# Patient Record
Sex: MALE | Race: WHITE | NOT HISPANIC OR LATINO | Employment: OTHER | URBAN - NONMETROPOLITAN AREA
[De-identification: names, ages, dates, MRNs, and addresses within clinical notes are randomized per-mention and may not be internally consistent; named-entity substitution may affect disease eponyms.]

---

## 2017-08-28 ENCOUNTER — OFFICE VISIT (OUTPATIENT)
Dept: UROLOGY | Facility: CLINIC | Age: 82
End: 2017-08-28

## 2017-08-28 VITALS
BODY MASS INDEX: 25.06 KG/M2 | OXYGEN SATURATION: 96 % | TEMPERATURE: 97.7 F | WEIGHT: 179 LBS | SYSTOLIC BLOOD PRESSURE: 102 MMHG | HEART RATE: 61 BPM | DIASTOLIC BLOOD PRESSURE: 70 MMHG | HEIGHT: 71 IN

## 2017-08-28 DIAGNOSIS — N40.0 BENIGN NON-NODULAR PROSTATIC HYPERPLASIA WITHOUT LOWER URINARY TRACT SYMPTOMS: Primary | ICD-10-CM

## 2017-08-28 DIAGNOSIS — G89.29 CHRONIC MIDLINE LOW BACK PAIN WITHOUT SCIATICA: ICD-10-CM

## 2017-08-28 DIAGNOSIS — M54.50 CHRONIC MIDLINE LOW BACK PAIN WITHOUT SCIATICA: ICD-10-CM

## 2017-08-28 LAB
BILIRUB BLD-MCNC: NEGATIVE MG/DL
CLARITY, POC: CLEAR
COLOR UR: YELLOW
GLUCOSE UR STRIP-MCNC: NEGATIVE MG/DL
KETONES UR QL: NEGATIVE
LEUKOCYTE EST, POC: NEGATIVE
NITRITE UR-MCNC: NEGATIVE MG/ML
PH UR: 6 [PH] (ref 5–8)
PROT UR STRIP-MCNC: NEGATIVE MG/DL
PSA SERPL-MCNC: 0.57 NG/ML (ref 0.06–4)
RBC # UR STRIP: NEGATIVE /UL
SP GR UR: 1.02 (ref 1–1.03)
UROBILINOGEN UR QL: NORMAL

## 2017-08-28 PROCEDURE — 99213 OFFICE O/P EST LOW 20 MIN: CPT | Performed by: UROLOGY

## 2017-08-28 PROCEDURE — 81003 URINALYSIS AUTO W/O SCOPE: CPT | Performed by: UROLOGY

## 2017-08-28 NOTE — PROGRESS NOTES
Chief Complaint  Benign Prostatic Hypertrophy (Yearly exam)        FRIDA Brown is a 84 y.o. male who returns today for annual checkup with a history of an enlarged prostate and symptoms of BPH but voiding pretty well on the current regimen of Flomax and Proscar and Cardura.  New complaint today is back pain that he states is been present intermittently for years since he had an injury to his back Working [employer], Department.    Vitals:    08/28/17 0922   BP: 102/70   Pulse: 61   Temp: 97.7 °F (36.5 °C)   SpO2: 96%       Past Medical History  Past Medical History:   Diagnosis Date   • Arthritis    • Asthma    • COPD (chronic obstructive pulmonary disease)        Past Surgical History  No past surgical history on file.    Medications  has a current medication list which includes the following prescription(s): aspirin, atorvastatin, ciprofloxacin, doxazosin, finasteride, ibuprofen, levothyroxine, levothyroxine sodium, montelukast, simvastatin, tamsulosin, tiotropium, valsartan, and valsartan-hydrochlorothiazide.      Allergies  No Known Allergies    Social History  Social History     Social History Narrative       Family History  He has no family history of bladder or kidney cancer  He has no family history of kidney stones      AUA Symptom Score:      Review of Systems  Review of Systems    Physical Exam  Physical Exam   Constitutional: He is oriented to person, place, and time. He appears well-developed and well-nourished.   HENT:   Head: Normocephalic and atraumatic.   Neck: Normal range of motion.   Pulmonary/Chest: Effort normal. No respiratory distress.   Abdominal: Soft. He exhibits no distension and no mass. There is no tenderness. No hernia.   Genitourinary: Rectum normal. Prostate is enlarged.   Musculoskeletal: Normal range of motion.   Lymphadenopathy:     He has no cervical adenopathy.   Neurological: He is alert and oriented to person, place, and time.   Skin: Skin is warm and dry.   Psychiatric: He  has a normal mood and affect. His behavior is normal.   Vitals reviewed.      Labs Recent and today in the office:  Results for orders placed or performed in visit on 08/28/17   POC Urinalysis Dipstick, Automated   Result Value Ref Range    Color Yellow Yellow, Straw, Dark Yellow, Maday    Clarity, UA Clear Clear    Glucose, UA Negative Negative, 1000 mg/dL (3+) mg/dL    Bilirubin Negative Negative    Ketones, UA Negative Negative    Specific Gravity  1.025 1.005 - 1.030    Blood, UA Negative Negative    pH, Urine 6.0 5.0 - 8.0    Protein, POC Negative Negative mg/dL    Urobilinogen, UA Normal Normal    Leukocytes Negative Negative    Nitrite, UA Negative Negative         Assessment & Plan  The prostate is only mildly enlarged with induration but no nodularity.  He consents to a PSA today and if significantly elevated may need workup for prostate cancer.  Otherwise I think his back pain is probably musculoskeletal in etiology and I recommend physical therapy.  He'll check back with his family doctor for referral.  He is also having some symptoms with his bowels that may need evaluating.

## 2017-09-23 DIAGNOSIS — N40.0 BENIGN NON-NODULAR PROSTATIC HYPERPLASIA WITHOUT LOWER URINARY TRACT SYMPTOMS: ICD-10-CM

## 2017-09-23 RX ORDER — FINASTERIDE 5 MG/1
5 TABLET, FILM COATED ORAL DAILY
Qty: 90 TABLET | Refills: 3 | Status: SHIPPED | OUTPATIENT
Start: 2017-09-23 | End: 2018-09-27 | Stop reason: SDUPTHER

## 2017-09-23 RX ORDER — TAMSULOSIN HYDROCHLORIDE 0.4 MG/1
1 CAPSULE ORAL NIGHTLY
Qty: 90 CAPSULE | Refills: 3 | Status: SHIPPED | OUTPATIENT
Start: 2017-09-23 | End: 2018-09-27 | Stop reason: SDUPTHER

## 2017-09-23 RX ORDER — DOXAZOSIN MESYLATE 4 MG/1
4 TABLET ORAL NIGHTLY
Qty: 90 TABLET | Refills: 3 | Status: SHIPPED | OUTPATIENT
Start: 2017-09-23

## 2018-01-26 ENCOUNTER — TRANSCRIBE ORDERS (OUTPATIENT)
Dept: ADMINISTRATIVE | Facility: HOSPITAL | Age: 83
End: 2018-01-26

## 2018-01-26 ENCOUNTER — HOSPITAL ENCOUNTER (OUTPATIENT)
Dept: GENERAL RADIOLOGY | Facility: HOSPITAL | Age: 83
Discharge: HOME | End: 2018-01-26
Admitting: FAMILY MEDICINE

## 2018-01-26 DIAGNOSIS — M79.652 PAIN IN BOTH THIGHS: Primary | ICD-10-CM

## 2018-01-26 DIAGNOSIS — M79.651 PAIN IN BOTH THIGHS: Primary | ICD-10-CM

## 2018-01-26 PROCEDURE — 72110 X-RAY EXAM L-2 SPINE 4/>VWS: CPT

## 2018-03-06 ENCOUNTER — OFFICE VISIT (OUTPATIENT)
Dept: CARDIAC SURGERY | Facility: CLINIC | Age: 83
End: 2018-03-06

## 2018-03-06 VITALS
WEIGHT: 185 LBS | BODY MASS INDEX: 25.9 KG/M2 | HEIGHT: 71 IN | TEMPERATURE: 98.9 F | DIASTOLIC BLOOD PRESSURE: 82 MMHG | SYSTOLIC BLOOD PRESSURE: 179 MMHG | OXYGEN SATURATION: 98 % | HEART RATE: 69 BPM

## 2018-03-06 DIAGNOSIS — I71.40 ABDOMINAL AORTIC ANEURYSM (AAA) WITHOUT RUPTURE (HCC): Primary | ICD-10-CM

## 2018-03-06 PROCEDURE — 99203 OFFICE O/P NEW LOW 30 MIN: CPT | Performed by: THORACIC SURGERY (CARDIOTHORACIC VASCULAR SURGERY)

## 2018-03-06 RX ORDER — MELATONIN
1000 DAILY
Status: ON HOLD | COMMUNITY
End: 2019-04-08

## 2018-03-06 RX ORDER — VITAMIN E 268 MG
400 CAPSULE ORAL DAILY
COMMUNITY
End: 2022-10-18

## 2018-09-27 ENCOUNTER — OFFICE VISIT (OUTPATIENT)
Dept: UROLOGY | Facility: CLINIC | Age: 83
End: 2018-09-27

## 2018-09-27 VITALS
TEMPERATURE: 98.5 F | SYSTOLIC BLOOD PRESSURE: 136 MMHG | WEIGHT: 185 LBS | HEIGHT: 71 IN | BODY MASS INDEX: 25.9 KG/M2 | OXYGEN SATURATION: 94 % | HEART RATE: 71 BPM | DIASTOLIC BLOOD PRESSURE: 79 MMHG

## 2018-09-27 DIAGNOSIS — N40.0 BENIGN NON-NODULAR PROSTATIC HYPERPLASIA WITHOUT LOWER URINARY TRACT SYMPTOMS: Primary | ICD-10-CM

## 2018-09-27 LAB
BILIRUB BLD-MCNC: NEGATIVE MG/DL
CLARITY, POC: CLEAR
COLOR UR: YELLOW
GLUCOSE UR STRIP-MCNC: NEGATIVE MG/DL
KETONES UR QL: NEGATIVE
LEUKOCYTE EST, POC: NEGATIVE
NITRITE UR-MCNC: NEGATIVE MG/ML
PH UR: 6 [PH] (ref 5–8)
PROT UR STRIP-MCNC: NEGATIVE MG/DL
RBC # UR STRIP: NEGATIVE /UL
SP GR UR: 1.01 (ref 1–1.03)
UROBILINOGEN UR QL: NORMAL

## 2018-09-27 PROCEDURE — 99213 OFFICE O/P EST LOW 20 MIN: CPT | Performed by: UROLOGY

## 2018-09-27 PROCEDURE — 81003 URINALYSIS AUTO W/O SCOPE: CPT | Performed by: UROLOGY

## 2018-09-27 RX ORDER — TAMSULOSIN HYDROCHLORIDE 0.4 MG/1
1 CAPSULE ORAL NIGHTLY
Qty: 90 CAPSULE | Refills: 3 | Status: SHIPPED | OUTPATIENT
Start: 2018-09-27 | End: 2019-05-20 | Stop reason: SDUPTHER

## 2018-09-27 RX ORDER — FINASTERIDE 5 MG/1
5 TABLET, FILM COATED ORAL DAILY
Qty: 90 TABLET | Refills: 3 | Status: SHIPPED | OUTPATIENT
Start: 2018-09-27

## 2018-09-27 NOTE — PROGRESS NOTES
Chief Complaint  Benign Prostatic Hypertrophy (yearly fup.)        FRIDA Brown is a 85 y.o. male who returns today for annual checkup with a long history of an enlarged prostate and symptoms of bladder outlet obstruction but voiding pretty well on Flomax and Proscar.  He describes an AUA index today of 1.  He has clear urine is negative for blood and infection.  He had a PSA and digital rectal exam that was benign last year and declines repeat today.  Biggest complaint today his macular degeneration and loss of eyesight    Vitals:    09/27/18 1319   BP: 136/79   Pulse: 71   Temp: 98.5 °F (36.9 °C)   SpO2: 94%       Past Medical History  Past Medical History:   Diagnosis Date   • Arthritis    • Asthma    • COPD (chronic obstructive pulmonary disease) (CMS/[facility])    • H/O emphysema    • Hiatal hernia    • Hyperlipidemia    • Hypertension    • Thyroid disease        Past Surgical History  Past Surgical History:   Procedure Laterality Date   • ABDOMINAL AORTIC ANEURYSM REPAIR     • HERNIA REPAIR     • KNEE SURGERY         Medications  has a current medication list which includes the following prescription(s): aspirin, atorvastatin, cholecalciferol, ciprofloxacin, doxazosin, finasteride, ibuprofen, levothyroxine, levothyroxine sodium, montelukast, simvastatin, tamsulosin, tiotropium, valsartan, valsartan-hydrochlorothiazide, and vitamin e.      Allergies  Allergies   Allergen Reactions   • Atorvastatin Unknown (See Comments)       Social History  Social History     Social History Narrative   • No narrative on file       Family History  He has no family history of bladder or kidney cancer  He has no family history of kidney stones      AUA Symptom Score:      Review of Systems  Review of Systems   Constitutional: Negative.    Genitourinary: Negative.    All other systems reviewed and are negative.      Physical Exam  Physical Exam    Labs Recent and today in the office:  Results for orders placed or performed in visit on  09/27/18   POC Urinalysis Dipstick, Automated   Result Value Ref Range    Color Yellow Yellow, Straw, Dark Yellow, Maday    Clarity, UA Clear Clear    Specific Gravity  1.015 1.005 - 1.030    pH, Urine 6.0 5.0 - 8.0    Leukocytes Negative Negative    Nitrite, UA Negative Negative    Protein, POC Negative Negative mg/dL    Glucose, UA Negative Negative, 1000 mg/dL (3+) mg/dL    Ketones, UA Negative Negative    Urobilinogen, UA Normal Normal    Bilirubin Negative Negative    Blood, UA Negative Negative         Assessment & Plan  #1 BPH with bladder outlet obstruction: Currently voiding fine on Flomax and Proscar.  These are renewed and he can return on an annual basis

## 2019-02-05 ENCOUNTER — HOSPITAL ENCOUNTER (EMERGENCY)
Facility: HOSPITAL | Age: 84
Discharge: HOME | End: 2019-02-05
Attending: EMERGENCY MEDICINE | Admitting: EMERGENCY MEDICINE

## 2019-02-05 ENCOUNTER — APPOINTMENT (OUTPATIENT)
Dept: GENERAL RADIOLOGY | Facility: HOSPITAL | Age: 84
End: 2019-02-05

## 2019-02-05 VITALS
WEIGHT: 180 LBS | HEIGHT: 71 IN | DIASTOLIC BLOOD PRESSURE: 68 MMHG | RESPIRATION RATE: 18 BRPM | TEMPERATURE: 98 F | OXYGEN SATURATION: 94 % | HEART RATE: 44 BPM | SYSTOLIC BLOOD PRESSURE: 153 MMHG | BODY MASS INDEX: 25.2 KG/M2

## 2019-02-05 DIAGNOSIS — R00.1 BRADYCARDIA, SINUS: Primary | ICD-10-CM

## 2019-02-05 LAB
ALBUMIN SERPL-MCNC: 4.1 G/DL (ref 3.5–5)
ALBUMIN/GLOB SERPL: 1.3 G/DL (ref 1–2)
ALP SERPL-CCNC: 61 U/L (ref 38–126)
ALT SERPL W P-5'-P-CCNC: 22 U/L (ref 13–69)
ANION GAP SERPL CALCULATED.3IONS-SCNC: 11.9 MMOL/L (ref 10–20)
AST SERPL-CCNC: 27 U/L (ref 15–46)
BILIRUB SERPL-MCNC: 0.7 MG/DL (ref 0.2–1.3)
BUN BLD-MCNC: 39 MG/DL (ref 7–20)
BUN/CREAT SERPL: 21.7 (ref 6.3–21.9)
CALCIUM SPEC-SCNC: 8.9 MG/DL (ref 8.4–10.2)
CHLORIDE SERPL-SCNC: 108 MMOL/L (ref 98–107)
CHOLEST SERPL-MCNC: 110 MG/DL (ref 0–199)
CO2 SERPL-SCNC: 23 MMOL/L (ref 26–30)
CREAT BLD-MCNC: 1.8 MG/DL (ref 0.6–1.3)
DEPRECATED RDW RBC AUTO: 43.8 FL (ref 37–54)
ERYTHROCYTE [DISTWIDTH] IN BLOOD BY AUTOMATED COUNT: 13.1 % (ref 11.5–14.5)
GFR SERPL CREATININE-BSD FRML MDRD: 36 ML/MIN/1.73
GLOBULIN UR ELPH-MCNC: 3.2 GM/DL
GLUCOSE BLD-MCNC: 109 MG/DL (ref 74–98)
HBA1C MFR BLD: 5.8 % (ref 3–6)
HCT VFR BLD AUTO: 32.3 % (ref 42–52)
HDLC SERPL-MCNC: 36 MG/DL (ref 40–60)
HGB BLD-MCNC: 10.7 G/DL (ref 14–18)
LDLC SERPL CALC-MCNC: 55 MG/DL (ref 0–99)
LDLC/HDLC SERPL: 1.53 {RATIO}
MCH RBC QN AUTO: 30.7 PG (ref 27–31)
MCHC RBC AUTO-ENTMCNC: 33.1 G/DL (ref 30–37)
MCV RBC AUTO: 92.8 FL (ref 80–94)
PLATELET # BLD AUTO: 108 10*3/MM3 (ref 130–400)
PMV BLD AUTO: 10.9 FL (ref 6–12)
POTASSIUM BLD-SCNC: 4.9 MMOL/L (ref 3.5–5.1)
PROT SERPL-MCNC: 7.3 G/DL (ref 6.3–8.2)
RBC # BLD AUTO: 3.48 10*6/MM3 (ref 4.7–6.1)
SCAN SLIDE: NORMAL
SODIUM BLD-SCNC: 138 MMOL/L (ref 137–145)
TRIGL SERPL-MCNC: 94 MG/DL
TROPONIN I SERPL-MCNC: 0.01 NG/ML (ref 0–0.03)
TSH SERPL DL<=0.05 MIU/L-ACNC: 3 MIU/ML (ref 0.47–4.68)
VLDLC SERPL-MCNC: 18.8 MG/DL
WBC NRBC COR # BLD: 6.19 10*3/MM3 (ref 4.8–10.8)

## 2019-02-05 PROCEDURE — 71101 X-RAY EXAM UNILAT RIBS/CHEST: CPT

## 2019-02-05 PROCEDURE — 83036 HEMOGLOBIN GLYCOSYLATED A1C: CPT | Performed by: PHYSICIAN ASSISTANT

## 2019-02-05 PROCEDURE — 80061 LIPID PANEL: CPT | Performed by: PHYSICIAN ASSISTANT

## 2019-02-05 PROCEDURE — 85025 COMPLETE CBC W/AUTO DIFF WBC: CPT | Performed by: PHYSICIAN ASSISTANT

## 2019-02-05 PROCEDURE — 84484 ASSAY OF TROPONIN QUANT: CPT | Performed by: PHYSICIAN ASSISTANT

## 2019-02-05 PROCEDURE — 85060 BLOOD SMEAR INTERPRETATION: CPT | Performed by: PHYSICIAN ASSISTANT

## 2019-02-05 PROCEDURE — 80053 COMPREHEN METABOLIC PANEL: CPT | Performed by: PHYSICIAN ASSISTANT

## 2019-02-05 PROCEDURE — 99284 EMERGENCY DEPT VISIT MOD MDM: CPT

## 2019-02-05 PROCEDURE — 84443 ASSAY THYROID STIM HORMONE: CPT | Performed by: PHYSICIAN ASSISTANT

## 2019-02-05 PROCEDURE — 85007 BL SMEAR W/DIFF WBC COUNT: CPT | Performed by: PHYSICIAN ASSISTANT

## 2019-02-05 PROCEDURE — 93005 ELECTROCARDIOGRAM TRACING: CPT | Performed by: PHYSICIAN ASSISTANT

## 2019-02-05 NOTE — DISCHARGE INSTRUCTIONS
Upon discharge from the ER go immediately to cardiologist Dr. Gallardo's office - you will be placed on a heart monitor and then tomorrow Dr. Gallardo is going to have an office visit with you    Return to ER if worse or new symptoms

## 2019-02-05 NOTE — ED PROVIDER NOTES
Subjective   84 y/o male presents with chest pain.  Patient states about 2-3 weeks he thinks he had a syncopal episode - patient states he was standing in his home looking out a window and the next thing he knows he finds himself on the floor.  Patient states for the past 2-3 weeks he has also been having intermittent chest pain.  Patient is not sure if the chest pain started before after the probable syncopal episode.  Patient states he has intermittent chest pain on the left side of his chest that radiates into his LUE.  Patient states it feels like his heart is skipping beats or going to stop.  Denies diaphoresis, SOA, nausea, vomiting.  Patient states he usually notices this pain when he is going up and down stairs at his home and then is relieved with rest.  This will last for seconds to minutes.  Patient is not a cigarette smoker.  History of HTN and hyperlipidemia.  Denies diabetes.  Patient states he has had AAA repair.  Denies MI, stents, arrhythmia.  Patient states he is not seen by a cardiologist.             Review of Systems   Cardiovascular: Positive for chest pain.   Neurological: Positive for syncope.   All other systems reviewed and are negative.      Past Medical History:   Diagnosis Date   • Arthritis    • Asthma    • COPD (chronic obstructive pulmonary disease) (CMS/[facility])    • H/O emphysema    • Hiatal hernia    • Hyperlipidemia    • Hypertension    • Thyroid disease        Allergies   Allergen Reactions   • Atorvastatin Unknown (See Comments)       Past Surgical History:   Procedure Laterality Date   • ABDOMINAL AORTIC ANEURYSM REPAIR     • HERNIA REPAIR     • KNEE SURGERY         Family History   Problem Relation Age of Onset   • No Known Problems Mother    • No Known Problems Father    • Bone cancer Sister    • Lung cancer Brother        Social History     Socioeconomic History   • Marital status:      Spouse name: Not on file   • Number of children: 2   • Years of education: Not on file    • Highest education level: Not on file   Occupational History   • Occupation:      Employer: RETIRED   Tobacco Use   • Smoking status: Former Smoker     Packs/day: 1.50     Years: 30.00     Pack years: 45.00     Types: Cigarettes     Last attempt to quit: 3/5/1998     Years since quittin.9   • Smokeless tobacco: Never Used   Substance and Sexual Activity   • Alcohol use: No   • Drug use: No   • Sexual activity: Defer           Objective   Physical Exam   Constitutional: He is oriented to person, place, and time. He appears well-developed and well-nourished. No distress.   HENT:   Head: Normocephalic and atraumatic.   Nose: Nose normal.   Mouth/Throat: Oropharynx is clear and moist.   Eyes: Conjunctivae and EOM are normal.   Neck: Normal range of motion. Neck supple. No JVD present. No tracheal deviation present.   Cardiovascular: Regular rhythm and normal heart sounds.   bradycardia   Pulmonary/Chest: Effort normal and breath sounds normal. No stridor. No respiratory distress.   Musculoskeletal: He exhibits no tenderness or deformity.   Trace bilateral pitting edema to lower extremities   Lymphadenopathy:     He has no cervical adenopathy.   Neurological: He is alert and oriented to person, place, and time.   No slurred speech, no facial droop, equal  in upper extremities, equal strength in lower extremities   Skin: Skin is warm and dry. No rash noted. He is not diaphoretic.   Psychiatric: He has a normal mood and affect. His behavior is normal.   Nursing note and vitals reviewed.      Procedures           ED Course  ED Course as of  153   Tue 2019   1356 EKG: Interpreted by me. SB w/ HR 52, nl intervals, no baldomero/std. TWI in lead aVL. Abnormal EKG  [AI]      ED Course User Index  [AI] Vikram Ellis MD                  MDM  Number of Diagnoses or Management Options  Bradycardia, sinus:      Amount and/or Complexity of Data Reviewed  Clinical lab tests: reviewed and  ordered  Tests in the radiology section of CPT®: reviewed and ordered  Discuss the patient with other providers: yes  Independent visualization of images, tracings, or specimens: yes    Risk of Complications, Morbidity, and/or Mortality  Presenting problems: moderate  Diagnostic procedures: moderate  Management options: moderate  General comments: While in the ER patient's heart rate ranged from 42 to 61.  Patient was asymptomatic while in the ER.  Patient is the sole caregiver of his wife at their home and refuses to be admitted.  Discussed with patient that is heart could keep decreased causing him to feel SOA, CP, weak, dizzy, and have a syncopal episode again.  Went on to discuss with patient that he could be admitted and seen by cardiology.  Patient again refuses, but was agreeable with me to consult cardiology for outpatient purposes.  On-call cardiologist Dr. Gallardo was consulted who would like a TSH, lipid profile, and a1c to be added to current blood work, patient to come to his office now to place on a heart monitor, and then will see patient tomorrow in an office visit.  This was discussed with patient who verbalizes understanding and is agreeable with this plan.  Patient understands if he has new symptoms or become worse then immediately call 911 or come to nearest ER.    Patient Progress  Patient progress: stable        Final diagnoses:   Bradycardia, sinus            Sera Jimenez PA-C  02/05/19 2295

## 2019-02-05 NOTE — ED NOTES
Pt hr dropping down to 41 bpm, Davdi Jimenez notified. No orders received at this time.      Karyn Castillo, STAN  02/05/19 2715

## 2019-02-08 LAB
CYTOLOGIST CVX/VAG CYTO: NORMAL
EOSINOPHIL # BLD MANUAL: 1.67 10*3/MM3 (ref 0–0.7)
EOSINOPHIL NFR BLD MANUAL: 27 % (ref 0–7)
LARGE PLATELETS: ABNORMAL
LYMPHOCYTES # BLD MANUAL: 0.99 10*3/MM3 (ref 0.6–3.4)
LYMPHOCYTES NFR BLD MANUAL: 16 % (ref 10–50)
LYMPHOCYTES NFR BLD MANUAL: 6 % (ref 0–12)
MONOCYTES # BLD AUTO: 0.37 10*3/MM3 (ref 0–0.9)
NEUTROPHILS # BLD AUTO: 3.16 10*3/MM3 (ref 2–6.9)
NEUTROPHILS NFR BLD MANUAL: 50 % (ref 37–80)
NEUTS BAND NFR BLD MANUAL: 1 % (ref 0–6)
PATH INTERP BLD-IMP: NORMAL
RBC MORPH BLD: NORMAL
SMALL PLATELETS BLD QL SMEAR: ABNORMAL
WBC MORPH BLD: NORMAL

## 2019-04-08 ENCOUNTER — APPOINTMENT (OUTPATIENT)
Dept: GENERAL RADIOLOGY | Facility: HOSPITAL | Age: 84
End: 2019-04-08

## 2019-04-08 ENCOUNTER — TRANSCRIBE ORDERS (OUTPATIENT)
Dept: CARDIOLOGY | Facility: HOSPITAL | Age: 84
End: 2019-04-08

## 2019-04-08 ENCOUNTER — HOSPITAL ENCOUNTER (OUTPATIENT)
Facility: HOSPITAL | Age: 84
Discharge: HOME | End: 2019-04-08
Attending: INTERNAL MEDICINE | Admitting: INTERNAL MEDICINE

## 2019-04-08 VITALS
RESPIRATION RATE: 16 BRPM | DIASTOLIC BLOOD PRESSURE: 92 MMHG | OXYGEN SATURATION: 96 % | TEMPERATURE: 98.5 F | WEIGHT: 183 LBS | HEART RATE: 71 BPM | SYSTOLIC BLOOD PRESSURE: 143 MMHG | BODY MASS INDEX: 23.49 KG/M2 | HEIGHT: 74 IN

## 2019-04-08 DIAGNOSIS — I49.5 SSS (SICK SINUS SYNDROME) (HCC): Primary | ICD-10-CM

## 2019-04-08 DIAGNOSIS — I49.5 SSS (SICK SINUS SYNDROME) (HCC): ICD-10-CM

## 2019-04-08 LAB
ANION GAP SERPL CALCULATED.3IONS-SCNC: 12.4 MMOL/L (ref 10–20)
ANISOCYTOSIS BLD QL: ABNORMAL
APTT PPP: 44.4 SECONDS (ref 24.5–37.2)
BUN BLD-MCNC: 37 MG/DL (ref 7–20)
BUN/CREAT SERPL: 19.5 (ref 6.3–21.9)
CALCIUM SPEC-SCNC: 9.5 MG/DL (ref 8.4–10.2)
CHLORIDE SERPL-SCNC: 108 MMOL/L (ref 98–107)
CO2 SERPL-SCNC: 24 MMOL/L (ref 26–30)
CREAT BLD-MCNC: 1.9 MG/DL (ref 0.6–1.3)
DEPRECATED RDW RBC AUTO: 45.6 FL (ref 37–54)
EOSINOPHIL # BLD MANUAL: 1.38 10*3/MM3 (ref 0–0.4)
EOSINOPHIL NFR BLD MANUAL: 22 % (ref 0.3–6.2)
ERYTHROCYTE [DISTWIDTH] IN BLOOD BY AUTOMATED COUNT: 13.1 % (ref 12.3–15.4)
GFR SERPL CREATININE-BSD FRML MDRD: 34 ML/MIN/1.73
GLUCOSE BLD-MCNC: 93 MG/DL (ref 74–98)
HCT VFR BLD AUTO: 30.3 % (ref 37.5–51)
HGB BLD-MCNC: 10.1 G/DL (ref 13–17.7)
INR PPP: 1.22 (ref 0.9–1.1)
LYMPHOCYTES # BLD MANUAL: 0.94 10*3/MM3 (ref 0.7–3.1)
LYMPHOCYTES NFR BLD MANUAL: 15 % (ref 19.6–45.3)
LYMPHOCYTES NFR BLD MANUAL: 6 % (ref 5–12)
MCH RBC QN AUTO: 31.6 PG (ref 26.6–33)
MCHC RBC AUTO-ENTMCNC: 33.3 G/DL (ref 31.5–35.7)
MCV RBC AUTO: 94.7 FL (ref 79–97)
MONOCYTES # BLD AUTO: 0.38 10*3/MM3 (ref 0.1–0.9)
NEUTROPHILS # BLD AUTO: 3.59 10*3/MM3 (ref 1.4–7)
NEUTROPHILS NFR BLD MANUAL: 53 % (ref 42.7–76)
NEUTS BAND NFR BLD MANUAL: 4 % (ref 0–5)
PLATELET # BLD AUTO: 114 10*3/MM3 (ref 140–450)
PMV BLD AUTO: 10 FL (ref 6–12)
POTASSIUM BLD-SCNC: 4.4 MMOL/L (ref 3.5–5.1)
PROTHROMBIN TIME: 15.7 SECONDS (ref 12–15.1)
RBC # BLD AUTO: 3.2 10*6/MM3 (ref 4.14–5.8)
SMALL PLATELETS BLD QL SMEAR: ABNORMAL
SODIUM BLD-SCNC: 140 MMOL/L (ref 137–145)
WBC MORPH BLD: NORMAL
WBC NRBC COR # BLD: 6.29 10*3/MM3 (ref 3.4–10.8)

## 2019-04-08 PROCEDURE — 85730 THROMBOPLASTIN TIME PARTIAL: CPT | Performed by: INTERNAL MEDICINE

## 2019-04-08 PROCEDURE — C1894 INTRO/SHEATH, NON-LASER: HCPCS | Performed by: INTERNAL MEDICINE

## 2019-04-08 PROCEDURE — 85025 COMPLETE CBC W/AUTO DIFF WBC: CPT | Performed by: INTERNAL MEDICINE

## 2019-04-08 PROCEDURE — 25010000002 FENTANYL CITRATE (PF) 100 MCG/2ML SOLUTION: Performed by: INTERNAL MEDICINE

## 2019-04-08 PROCEDURE — 99153 MOD SED SAME PHYS/QHP EA: CPT | Performed by: INTERNAL MEDICINE

## 2019-04-08 PROCEDURE — C1898 LEAD, PMKR, OTHER THAN TRANS: HCPCS | Performed by: INTERNAL MEDICINE

## 2019-04-08 PROCEDURE — 85007 BL SMEAR W/DIFF WBC COUNT: CPT | Performed by: INTERNAL MEDICINE

## 2019-04-08 PROCEDURE — 80048 BASIC METABOLIC PNL TOTAL CA: CPT | Performed by: INTERNAL MEDICINE

## 2019-04-08 PROCEDURE — 25010000003 CEFAZOLIN PER 500 MG: Performed by: INTERNAL MEDICINE

## 2019-04-08 PROCEDURE — 25010000002 MIDAZOLAM PER 1 MG: Performed by: INTERNAL MEDICINE

## 2019-04-08 PROCEDURE — C1785 PMKR, DUAL, RATE-RESP: HCPCS | Performed by: INTERNAL MEDICINE

## 2019-04-08 PROCEDURE — 85610 PROTHROMBIN TIME: CPT | Performed by: INTERNAL MEDICINE

## 2019-04-08 PROCEDURE — 71045 X-RAY EXAM CHEST 1 VIEW: CPT

## 2019-04-08 PROCEDURE — 33208 INSRT HEART PM ATRIAL & VENT: CPT | Performed by: INTERNAL MEDICINE

## 2019-04-08 PROCEDURE — 99152 MOD SED SAME PHYS/QHP 5/>YRS: CPT | Performed by: INTERNAL MEDICINE

## 2019-04-08 DEVICE — LD PM TENDRIL STS 6F58CM 2088TC58: Type: IMPLANTABLE DEVICE | Status: FUNCTIONAL

## 2019-04-08 DEVICE — LD PM TENDRIL STS 6F52CM 2088TC52: Type: IMPLANTABLE DEVICE | Status: FUNCTIONAL

## 2019-04-08 DEVICE — GEN PM ASSURITY MRI DR RF PM2272: Type: IMPLANTABLE DEVICE | Status: FUNCTIONAL

## 2019-04-08 RX ORDER — HYDROCODONE BITARTRATE AND ACETAMINOPHEN 5; 325 MG/1; MG/1
1 TABLET ORAL EVERY 4 HOURS PRN
Status: DISCONTINUED | OUTPATIENT
Start: 2019-04-08 | End: 2019-04-08

## 2019-04-08 RX ORDER — MIDAZOLAM HYDROCHLORIDE 1 MG/ML
INJECTION INTRAMUSCULAR; INTRAVENOUS AS NEEDED
Status: DISCONTINUED | OUTPATIENT
Start: 2019-04-08 | End: 2019-04-08 | Stop reason: HOSPADM

## 2019-04-08 RX ORDER — ZOLPIDEM TARTRATE 5 MG/1
5 TABLET ORAL NIGHTLY PRN
Status: DISCONTINUED | OUTPATIENT
Start: 2019-04-08 | End: 2019-04-08 | Stop reason: HOSPADM

## 2019-04-08 RX ORDER — FENTANYL CITRATE 50 UG/ML
INJECTION, SOLUTION INTRAMUSCULAR; INTRAVENOUS AS NEEDED
Status: DISCONTINUED | OUTPATIENT
Start: 2019-04-08 | End: 2019-04-08 | Stop reason: HOSPADM

## 2019-04-08 RX ORDER — ONDANSETRON 2 MG/ML
4 INJECTION INTRAMUSCULAR; INTRAVENOUS EVERY 6 HOURS PRN
Status: DISCONTINUED | OUTPATIENT
Start: 2019-04-08 | End: 2019-04-08

## 2019-04-08 RX ORDER — DIPHENHYDRAMINE HYDROCHLORIDE 50 MG/ML
50 INJECTION INTRAMUSCULAR; INTRAVENOUS ONCE
Status: DISCONTINUED | OUTPATIENT
Start: 2019-04-08 | End: 2019-04-08

## 2019-04-08 RX ORDER — SODIUM CHLORIDE 0.9 % (FLUSH) 0.9 %
3 SYRINGE (ML) INJECTION EVERY 12 HOURS SCHEDULED
Status: DISCONTINUED | OUTPATIENT
Start: 2019-04-08 | End: 2019-04-08

## 2019-04-08 RX ORDER — FAMOTIDINE 10 MG/ML
20 INJECTION, SOLUTION INTRAVENOUS ONCE
Status: DISCONTINUED | OUTPATIENT
Start: 2019-04-08 | End: 2019-04-08

## 2019-04-08 RX ORDER — ACETAMINOPHEN 325 MG/1
650 TABLET ORAL EVERY 4 HOURS PRN
Status: DISCONTINUED | OUTPATIENT
Start: 2019-04-08 | End: 2019-04-08

## 2019-04-08 RX ORDER — SODIUM CHLORIDE 0.9 % (FLUSH) 0.9 %
3-10 SYRINGE (ML) INJECTION AS NEEDED
Status: DISCONTINUED | OUTPATIENT
Start: 2019-04-08 | End: 2019-04-08 | Stop reason: HOSPADM

## 2019-04-08 RX ORDER — ONDANSETRON 2 MG/ML
4 INJECTION INTRAMUSCULAR; INTRAVENOUS EVERY 6 HOURS PRN
Status: DISCONTINUED | OUTPATIENT
Start: 2019-04-08 | End: 2019-04-08 | Stop reason: HOSPADM

## 2019-04-08 RX ORDER — AMLODIPINE BESYLATE 2.5 MG/1
2.5 TABLET ORAL DAILY
COMMUNITY
End: 2022-10-18

## 2019-04-08 RX ORDER — SODIUM CHLORIDE 0.9 % (FLUSH) 0.9 %
3-10 SYRINGE (ML) INJECTION AS NEEDED
Status: DISCONTINUED | OUTPATIENT
Start: 2019-04-08 | End: 2019-04-08

## 2019-04-08 RX ORDER — SODIUM CHLORIDE 0.9 % (FLUSH) 0.9 %
3 SYRINGE (ML) INJECTION EVERY 12 HOURS SCHEDULED
Status: DISCONTINUED | OUTPATIENT
Start: 2019-04-08 | End: 2019-04-08 | Stop reason: HOSPADM

## 2019-04-08 RX ORDER — LIDOCAINE HYDROCHLORIDE 10 MG/ML
INJECTION, SOLUTION INFILTRATION; PERINEURAL AS NEEDED
Status: DISCONTINUED | OUTPATIENT
Start: 2019-04-08 | End: 2019-04-08 | Stop reason: HOSPADM

## 2019-04-08 RX ADMIN — CEFAZOLIN 2 G: 1 INJECTION, POWDER, FOR SOLUTION INTRAVENOUS at 09:38

## 2019-04-08 RX ADMIN — CEFAZOLIN 2 G: 1 INJECTION, POWDER, FOR SOLUTION INTRAVENOUS at 14:05

## 2019-04-08 NOTE — DISCHARGE INSTR - APPOINTMENTS
Follow up with Dr Gallardo next Monday at 2 pm.   Please bring all your medicines to update your medication list.

## 2019-04-08 NOTE — DISCHARGE INSTRUCTIONS
Do not get Pacemaker site wet till after return visit to Dr Gallardo's office next Monday.   Limit activity and do not lift arm above head until after seeing Dr Gallardo next Monday.   Keep sling on for 24 hours and then use it at night to remind you not to raise arm above your head.   Do not take blood thinner until after appointment next Monday.  Do not drive x one week

## 2019-04-15 ENCOUNTER — TELEPHONE (OUTPATIENT)
Dept: CARDIAC SURGERY | Facility: CLINIC | Age: 84
End: 2019-04-15

## 2019-04-22 ENCOUNTER — TELEPHONE (OUTPATIENT)
Dept: CARDIAC SURGERY | Facility: CLINIC | Age: 84
End: 2019-04-22

## 2019-04-24 ENCOUNTER — LAB (OUTPATIENT)
Dept: LAB | Facility: HOSPITAL | Age: 84
End: 2019-04-24

## 2019-04-24 ENCOUNTER — TRANSCRIBE ORDERS (OUTPATIENT)
Dept: LAB | Facility: HOSPITAL | Age: 84
End: 2019-04-24

## 2019-04-24 DIAGNOSIS — R58 BLEEDING: Primary | ICD-10-CM

## 2019-04-24 DIAGNOSIS — R58 BLEEDING: ICD-10-CM

## 2019-04-24 LAB
BASOPHILS # BLD AUTO: 0.05 10*3/MM3 (ref 0–0.2)
BASOPHILS NFR BLD AUTO: 0.7 % (ref 0–1.5)
DEPRECATED RDW RBC AUTO: 44.6 FL (ref 37–54)
EOSINOPHIL # BLD AUTO: 1.54 10*3/MM3 (ref 0–0.4)
EOSINOPHIL NFR BLD AUTO: 22.7 % (ref 0.3–6.2)
ERYTHROCYTE [DISTWIDTH] IN BLOOD BY AUTOMATED COUNT: 13.2 % (ref 12.3–15.4)
ERYTHROCYTE [SEDIMENTATION RATE] IN BLOOD: 44 MM/HR (ref 0–15)
HCT VFR BLD AUTO: 30.3 % (ref 37.5–51)
HGB BLD-MCNC: 10.1 G/DL (ref 13–17.7)
HIGH SENSITIVE C-REACTIVE PROTEIN MG/L: 2.46 MG/L (ref 1–3)
IMM GRANULOCYTES # BLD AUTO: 0.02 10*3/MM3 (ref 0–0.05)
IMM GRANULOCYTES NFR BLD AUTO: 0.3 % (ref 0–0.5)
LYMPHOCYTES # BLD AUTO: 0.85 10*3/MM3 (ref 0.7–3.1)
LYMPHOCYTES NFR BLD AUTO: 12.5 % (ref 19.6–45.3)
MCH RBC QN AUTO: 31 PG (ref 26.6–33)
MCHC RBC AUTO-ENTMCNC: 33.3 G/DL (ref 31.5–35.7)
MCV RBC AUTO: 92.9 FL (ref 79–97)
MONOCYTES # BLD AUTO: 0.46 10*3/MM3 (ref 0.1–0.9)
MONOCYTES NFR BLD AUTO: 6.8 % (ref 5–12)
NEUTROPHILS # BLD AUTO: 3.86 10*3/MM3 (ref 1.7–7)
NEUTROPHILS NFR BLD AUTO: 57 % (ref 42.7–76)
NRBC BLD AUTO-RTO: 0 /100 WBC (ref 0–0.2)
PLATELET # BLD AUTO: 143 10*3/MM3 (ref 140–450)
PMV BLD AUTO: 10.6 FL (ref 6–12)
RBC # BLD AUTO: 3.26 10*6/MM3 (ref 4.14–5.8)
WBC NRBC COR # BLD: 6.78 10*3/MM3 (ref 3.4–10.8)

## 2019-04-24 PROCEDURE — 86141 C-REACTIVE PROTEIN HS: CPT

## 2019-04-24 PROCEDURE — 85651 RBC SED RATE NONAUTOMATED: CPT

## 2019-04-24 PROCEDURE — 85025 COMPLETE CBC W/AUTO DIFF WBC: CPT

## 2019-04-24 PROCEDURE — 36415 COLL VENOUS BLD VENIPUNCTURE: CPT

## 2019-05-20 DIAGNOSIS — N40.0 BENIGN NON-NODULAR PROSTATIC HYPERPLASIA WITHOUT LOWER URINARY TRACT SYMPTOMS: ICD-10-CM

## 2019-05-20 RX ORDER — TAMSULOSIN HYDROCHLORIDE 0.4 MG/1
1 CAPSULE ORAL NIGHTLY
Qty: 90 CAPSULE | Refills: 3 | Status: SHIPPED | OUTPATIENT
Start: 2019-05-20 | End: 2020-05-14

## 2019-08-28 ENCOUNTER — LAB (OUTPATIENT)
Dept: LAB | Facility: HOSPITAL | Age: 84
End: 2019-08-28

## 2019-08-28 ENCOUNTER — TRANSCRIBE ORDERS (OUTPATIENT)
Dept: LAB | Facility: HOSPITAL | Age: 84
End: 2019-08-28

## 2019-08-28 DIAGNOSIS — D64.9 ANEMIA, UNSPECIFIED TYPE: Primary | ICD-10-CM

## 2019-08-28 DIAGNOSIS — E03.9 HYPOTHYROIDISM, ADULT: ICD-10-CM

## 2019-08-28 DIAGNOSIS — D64.9 ANEMIA, UNSPECIFIED TYPE: ICD-10-CM

## 2019-08-28 DIAGNOSIS — E78.5 HYPERLIPIDEMIA, UNSPECIFIED HYPERLIPIDEMIA TYPE: ICD-10-CM

## 2019-08-28 DIAGNOSIS — N18.30 CHRONIC KIDNEY DISEASE, STAGE III (MODERATE) (HCC): ICD-10-CM

## 2019-08-28 LAB
ALBUMIN SERPL-MCNC: 4.3 G/DL (ref 3.5–5.2)
ALBUMIN/GLOB SERPL: 1.5 G/DL
ALP SERPL-CCNC: 76 U/L (ref 39–117)
ALT SERPL W P-5'-P-CCNC: 10 U/L (ref 1–41)
ANION GAP SERPL CALCULATED.3IONS-SCNC: 11.4 MMOL/L (ref 5–15)
AST SERPL-CCNC: 16 U/L (ref 1–40)
BASOPHILS # BLD MANUAL: 0.08 10*3/MM3 (ref 0–0.2)
BASOPHILS NFR BLD AUTO: 1.1 % (ref 0–1.5)
BILIRUB SERPL-MCNC: 0.6 MG/DL (ref 0.2–1.2)
BUN BLD-MCNC: 43 MG/DL (ref 8–23)
BUN/CREAT SERPL: 23.1 (ref 7–25)
CALCIUM SPEC-SCNC: 9.4 MG/DL (ref 8.6–10.5)
CHLORIDE SERPL-SCNC: 105 MMOL/L (ref 98–107)
CHOLEST SERPL-MCNC: 100 MG/DL (ref 0–200)
CO2 SERPL-SCNC: 21.6 MMOL/L (ref 22–29)
CREAT BLD-MCNC: 1.86 MG/DL (ref 0.76–1.27)
DEPRECATED RDW RBC AUTO: 48.9 FL (ref 37–54)
EOSINOPHIL # BLD MANUAL: 2.2 10*3/MM3 (ref 0–0.4)
EOSINOPHIL NFR BLD MANUAL: 31.6 % (ref 0.3–6.2)
ERYTHROCYTE [DISTWIDTH] IN BLOOD BY AUTOMATED COUNT: 13.6 % (ref 12.3–15.4)
GFR SERPL CREATININE-BSD FRML MDRD: 35 ML/MIN/1.73
GLOBULIN UR ELPH-MCNC: 2.9 GM/DL
GLUCOSE BLD-MCNC: 106 MG/DL (ref 65–99)
HCT VFR BLD AUTO: 32.9 % (ref 37.5–51)
HDLC SERPL-MCNC: 37 MG/DL (ref 40–60)
HGB BLD-MCNC: 10.3 G/DL (ref 13–17.7)
LDLC SERPL CALC-MCNC: 48 MG/DL (ref 0–100)
LDLC/HDLC SERPL: 1.31 {RATIO}
LYMPHOCYTES # BLD MANUAL: 1.17 10*3/MM3 (ref 0.7–3.1)
LYMPHOCYTES NFR BLD MANUAL: 1.1 % (ref 5–12)
LYMPHOCYTES NFR BLD MANUAL: 16.8 % (ref 19.6–45.3)
MCH RBC QN AUTO: 31 PG (ref 26.6–33)
MCHC RBC AUTO-ENTMCNC: 31.3 G/DL (ref 31.5–35.7)
MCV RBC AUTO: 99.1 FL (ref 79–97)
MONOCYTES # BLD AUTO: 0.08 10*3/MM3 (ref 0.1–0.9)
NEUTROPHILS # BLD AUTO: 3.45 10*3/MM3 (ref 1.7–7)
NEUTROPHILS NFR BLD MANUAL: 49.5 % (ref 42.7–76)
PLAT MORPH BLD: NORMAL
PLATELET # BLD AUTO: 151 10*3/MM3 (ref 140–450)
PMV BLD AUTO: 11.1 FL (ref 6–12)
POTASSIUM BLD-SCNC: 4.7 MMOL/L (ref 3.5–5.2)
PROT SERPL-MCNC: 7.2 G/DL (ref 6–8.5)
RBC # BLD AUTO: 3.32 10*6/MM3 (ref 4.14–5.8)
RBC MORPH BLD: NORMAL
SODIUM BLD-SCNC: 138 MMOL/L (ref 136–145)
TRIGL SERPL-MCNC: 73 MG/DL (ref 0–150)
TSH SERPL DL<=0.05 MIU/L-ACNC: 2.05 MIU/ML (ref 0.27–4.2)
VLDLC SERPL-MCNC: 14.6 MG/DL (ref 5–40)
WBC MORPH BLD: NORMAL
WBC NRBC COR # BLD: 6.96 10*3/MM3 (ref 3.4–10.8)

## 2019-08-28 PROCEDURE — 85007 BL SMEAR W/DIFF WBC COUNT: CPT

## 2019-08-28 PROCEDURE — 80061 LIPID PANEL: CPT

## 2019-08-28 PROCEDURE — 85025 COMPLETE CBC W/AUTO DIFF WBC: CPT

## 2019-08-28 PROCEDURE — 36415 COLL VENOUS BLD VENIPUNCTURE: CPT

## 2019-08-28 PROCEDURE — 84443 ASSAY THYROID STIM HORMONE: CPT

## 2019-08-28 PROCEDURE — 80053 COMPREHEN METABOLIC PANEL: CPT

## 2020-01-27 ENCOUNTER — LAB (OUTPATIENT)
Dept: LAB | Facility: HOSPITAL | Age: 85
End: 2020-01-27

## 2020-01-27 ENCOUNTER — TRANSCRIBE ORDERS (OUTPATIENT)
Dept: LAB | Facility: HOSPITAL | Age: 85
End: 2020-01-27

## 2020-01-27 DIAGNOSIS — N62 GYNECOMASTIA: ICD-10-CM

## 2020-01-27 DIAGNOSIS — E78.5 HYPERLIPIDEMIA, UNSPECIFIED HYPERLIPIDEMIA TYPE: Primary | ICD-10-CM

## 2020-01-27 DIAGNOSIS — E78.5 HYPERLIPIDEMIA, UNSPECIFIED HYPERLIPIDEMIA TYPE: ICD-10-CM

## 2020-01-27 LAB
ALBUMIN SERPL-MCNC: 4 G/DL (ref 3.5–5.2)
ALBUMIN/GLOB SERPL: 1.3 G/DL
ALP SERPL-CCNC: 66 U/L (ref 39–117)
ALT SERPL W P-5'-P-CCNC: 5 U/L (ref 1–41)
ANION GAP SERPL CALCULATED.3IONS-SCNC: 13.7 MMOL/L (ref 5–15)
AST SERPL-CCNC: 12 U/L (ref 1–40)
BASOPHILS # BLD MANUAL: 0.07 10*3/MM3 (ref 0–0.2)
BASOPHILS NFR BLD AUTO: 1 % (ref 0–1.5)
BILIRUB SERPL-MCNC: 0.7 MG/DL (ref 0.2–1.2)
BUN BLD-MCNC: 38 MG/DL (ref 8–23)
BUN/CREAT SERPL: 18.7 (ref 7–25)
CALCIUM SPEC-SCNC: 9.2 MG/DL (ref 8.6–10.5)
CHLORIDE SERPL-SCNC: 106 MMOL/L (ref 98–107)
CHOLEST SERPL-MCNC: 113 MG/DL (ref 0–200)
CO2 SERPL-SCNC: 22.3 MMOL/L (ref 22–29)
CREAT BLD-MCNC: 2.03 MG/DL (ref 0.76–1.27)
DEPRECATED RDW RBC AUTO: 42.9 FL (ref 37–54)
EOSINOPHIL # BLD MANUAL: 1.81 10*3/MM3 (ref 0–0.4)
EOSINOPHIL NFR BLD MANUAL: 26.3 % (ref 0.3–6.2)
ERYTHROCYTE [DISTWIDTH] IN BLOOD BY AUTOMATED COUNT: 13.1 % (ref 12.3–15.4)
ESTRADIOL SERPL HS-MCNC: 31.6 PG/ML
GFR SERPL CREATININE-BSD FRML MDRD: 31 ML/MIN/1.73
GLOBULIN UR ELPH-MCNC: 3.2 GM/DL
GLUCOSE BLD-MCNC: 106 MG/DL (ref 65–99)
HCT VFR BLD AUTO: 31.3 % (ref 37.5–51)
HDLC SERPL-MCNC: 38 MG/DL (ref 40–60)
HGB BLD-MCNC: 10.5 G/DL (ref 13–17.7)
LDLC SERPL CALC-MCNC: 57 MG/DL (ref 0–100)
LDLC/HDLC SERPL: 1.51 {RATIO}
LH SERPL-ACNC: 24.2 MIU/ML
LYMPHOCYTES # BLD MANUAL: 1.04 10*3/MM3 (ref 0.7–3.1)
LYMPHOCYTES NFR BLD MANUAL: 15.2 % (ref 19.6–45.3)
LYMPHOCYTES NFR BLD MANUAL: 2 % (ref 5–12)
MCH RBC QN AUTO: 30.8 PG (ref 26.6–33)
MCHC RBC AUTO-ENTMCNC: 33.5 G/DL (ref 31.5–35.7)
MCV RBC AUTO: 91.8 FL (ref 79–97)
MONOCYTES # BLD AUTO: 0.14 10*3/MM3 (ref 0.1–0.9)
NEUTROPHILS # BLD AUTO: 3.82 10*3/MM3 (ref 1.7–7)
NEUTROPHILS NFR BLD MANUAL: 55.6 % (ref 42.7–76)
PLAT MORPH BLD: NORMAL
PLATELET # BLD AUTO: 129 10*3/MM3 (ref 140–450)
PMV BLD AUTO: 11.2 FL (ref 6–12)
POTASSIUM BLD-SCNC: 4.9 MMOL/L (ref 3.5–5.2)
PROLACTIN SERPL-MCNC: 17.3 NG/ML (ref 4.04–15.2)
PROT SERPL-MCNC: 7.2 G/DL (ref 6–8.5)
RBC # BLD AUTO: 3.41 10*6/MM3 (ref 4.14–5.8)
RBC MORPH BLD: NORMAL
SODIUM BLD-SCNC: 142 MMOL/L (ref 136–145)
TRIGL SERPL-MCNC: 89 MG/DL (ref 0–150)
VLDLC SERPL-MCNC: 17.8 MG/DL (ref 5–40)
WBC MORPH BLD: NORMAL
WBC NRBC COR # BLD: 6.87 10*3/MM3 (ref 3.4–10.8)

## 2020-01-27 PROCEDURE — 85007 BL SMEAR W/DIFF WBC COUNT: CPT

## 2020-01-27 PROCEDURE — 84402 ASSAY OF FREE TESTOSTERONE: CPT

## 2020-01-27 PROCEDURE — 80053 COMPREHEN METABOLIC PANEL: CPT

## 2020-01-27 PROCEDURE — 36415 COLL VENOUS BLD VENIPUNCTURE: CPT

## 2020-01-27 PROCEDURE — 80061 LIPID PANEL: CPT

## 2020-01-27 PROCEDURE — 85025 COMPLETE CBC W/AUTO DIFF WBC: CPT

## 2020-01-27 PROCEDURE — 82670 ASSAY OF TOTAL ESTRADIOL: CPT

## 2020-01-27 PROCEDURE — 84146 ASSAY OF PROLACTIN: CPT

## 2020-01-27 PROCEDURE — 84403 ASSAY OF TOTAL TESTOSTERONE: CPT

## 2020-01-27 PROCEDURE — 83002 ASSAY OF GONADOTROPIN (LH): CPT

## 2020-01-28 LAB
TESTOST FREE SERPL-MCNC: 5.3 PG/ML (ref 6.6–18.1)
TESTOST SERPL-MCNC: 578 NG/DL (ref 264–916)

## 2020-02-07 ENCOUNTER — TRANSCRIBE ORDERS (OUTPATIENT)
Dept: ADMINISTRATIVE | Facility: HOSPITAL | Age: 85
End: 2020-02-07

## 2020-02-07 DIAGNOSIS — N62 HYPERTROPHY OF BREAST: Primary | ICD-10-CM

## 2020-05-12 DIAGNOSIS — N40.0 BENIGN NON-NODULAR PROSTATIC HYPERPLASIA WITHOUT LOWER URINARY TRACT SYMPTOMS: ICD-10-CM

## 2020-05-14 RX ORDER — TAMSULOSIN HYDROCHLORIDE 0.4 MG/1
1 CAPSULE ORAL NIGHTLY
Qty: 90 CAPSULE | Refills: 3 | Status: SHIPPED | OUTPATIENT
Start: 2020-05-14 | End: 2022-10-18 | Stop reason: SDUPTHER

## 2020-09-15 ENCOUNTER — TRANSCRIBE ORDERS (OUTPATIENT)
Dept: LAB | Facility: HOSPITAL | Age: 85
End: 2020-09-15

## 2020-09-15 ENCOUNTER — LAB (OUTPATIENT)
Dept: LAB | Facility: HOSPITAL | Age: 85
End: 2020-09-15

## 2020-09-15 DIAGNOSIS — N18.30 CHRONIC KIDNEY DISEASE, STAGE III (MODERATE) (HCC): Primary | ICD-10-CM

## 2020-09-15 DIAGNOSIS — N18.30 CHRONIC KIDNEY DISEASE, STAGE III (MODERATE) (HCC): ICD-10-CM

## 2020-09-15 LAB
ALBUMIN SERPL-MCNC: 4.1 G/DL (ref 3.5–5.2)
ANION GAP SERPL CALCULATED.3IONS-SCNC: 8.2 MMOL/L (ref 5–15)
BUN SERPL-MCNC: 43 MG/DL (ref 8–23)
BUN/CREAT SERPL: 24.2 (ref 7–25)
CALCIUM SPEC-SCNC: 8.9 MG/DL (ref 8.6–10.5)
CHLORIDE SERPL-SCNC: 109 MMOL/L (ref 98–107)
CO2 SERPL-SCNC: 20.8 MMOL/L (ref 22–29)
CREAT SERPL-MCNC: 1.78 MG/DL (ref 0.76–1.27)
DEPRECATED RDW RBC AUTO: 46.6 FL (ref 37–54)
ERYTHROCYTE [DISTWIDTH] IN BLOOD BY AUTOMATED COUNT: 13.8 % (ref 12.3–15.4)
GFR SERPL CREATININE-BSD FRML MDRD: 36 ML/MIN/1.73
GLUCOSE SERPL-MCNC: 120 MG/DL (ref 65–99)
HCT VFR BLD AUTO: 27.6 % (ref 37.5–51)
HGB BLD-MCNC: 9.2 G/DL (ref 13–17.7)
MCH RBC QN AUTO: 30.8 PG (ref 26.6–33)
MCHC RBC AUTO-ENTMCNC: 33.3 G/DL (ref 31.5–35.7)
MCV RBC AUTO: 92.3 FL (ref 79–97)
PHOSPHATE SERPL-MCNC: 3.6 MG/DL (ref 2.5–4.5)
PLATELET # BLD AUTO: 140 10*3/MM3 (ref 140–450)
PMV BLD AUTO: 11.3 FL (ref 6–12)
POTASSIUM SERPL-SCNC: 4.7 MMOL/L (ref 3.5–5.2)
PTH-INTACT SERPL-MCNC: 48.3 PG/ML (ref 15–65)
RBC # BLD AUTO: 2.99 10*6/MM3 (ref 4.14–5.8)
SODIUM SERPL-SCNC: 138 MMOL/L (ref 136–145)
WBC # BLD AUTO: 6.65 10*3/MM3 (ref 3.4–10.8)

## 2020-09-15 PROCEDURE — 83970 ASSAY OF PARATHORMONE: CPT

## 2020-09-15 PROCEDURE — 80069 RENAL FUNCTION PANEL: CPT

## 2020-09-15 PROCEDURE — 36415 COLL VENOUS BLD VENIPUNCTURE: CPT

## 2020-09-15 PROCEDURE — 85027 COMPLETE CBC AUTOMATED: CPT

## 2020-12-22 ENCOUNTER — TRANSCRIBE ORDERS (OUTPATIENT)
Dept: LAB | Facility: HOSPITAL | Age: 85
End: 2020-12-22

## 2020-12-22 ENCOUNTER — LAB (OUTPATIENT)
Dept: LAB | Facility: HOSPITAL | Age: 85
End: 2020-12-22

## 2020-12-22 DIAGNOSIS — N18.30 MALIGNANT HYPERTENSIVE HEART AND CHRONIC KIDNEY DISEASE STAGE III (HCC): Primary | ICD-10-CM

## 2020-12-22 DIAGNOSIS — N18.30 MALIGNANT HYPERTENSIVE HEART AND CHRONIC KIDNEY DISEASE STAGE III (HCC): ICD-10-CM

## 2020-12-22 DIAGNOSIS — I13.10 MALIGNANT HYPERTENSIVE HEART AND CHRONIC KIDNEY DISEASE STAGE III (HCC): ICD-10-CM

## 2020-12-22 DIAGNOSIS — I13.10 MALIGNANT HYPERTENSIVE HEART AND CHRONIC KIDNEY DISEASE STAGE III (HCC): Primary | ICD-10-CM

## 2020-12-22 LAB
ALBUMIN SERPL-MCNC: 4.2 G/DL (ref 3.5–5.2)
ANION GAP SERPL CALCULATED.3IONS-SCNC: 9.9 MMOL/L (ref 5–15)
BUN SERPL-MCNC: 31 MG/DL (ref 8–23)
BUN/CREAT SERPL: 17.2 (ref 7–25)
CALCIUM SPEC-SCNC: 9.1 MG/DL (ref 8.6–10.5)
CHLORIDE SERPL-SCNC: 108 MMOL/L (ref 98–107)
CO2 SERPL-SCNC: 22.1 MMOL/L (ref 22–29)
CREAT SERPL-MCNC: 1.8 MG/DL (ref 0.76–1.27)
DEPRECATED RDW RBC AUTO: 43.3 FL (ref 37–54)
ERYTHROCYTE [DISTWIDTH] IN BLOOD BY AUTOMATED COUNT: 12.8 % (ref 12.3–15.4)
GFR SERPL CREATININE-BSD FRML MDRD: 36 ML/MIN/1.73
GLUCOSE SERPL-MCNC: 102 MG/DL (ref 65–99)
HCT VFR BLD AUTO: 31.5 % (ref 37.5–51)
HGB BLD-MCNC: 10.5 G/DL (ref 13–17.7)
IRON 24H UR-MRATE: 39 MCG/DL (ref 59–158)
IRON SATN MFR SERPL: 11 % (ref 20–50)
MCH RBC QN AUTO: 31.4 PG (ref 26.6–33)
MCHC RBC AUTO-ENTMCNC: 33.3 G/DL (ref 31.5–35.7)
MCV RBC AUTO: 94.3 FL (ref 79–97)
PHOSPHATE SERPL-MCNC: 3.3 MG/DL (ref 2.5–4.5)
PLATELET # BLD AUTO: 132 10*3/MM3 (ref 140–450)
PMV BLD AUTO: 11.3 FL (ref 6–12)
POTASSIUM SERPL-SCNC: 5.4 MMOL/L (ref 3.5–5.2)
PTH-INTACT SERPL-MCNC: 63.6 PG/ML (ref 15–65)
RBC # BLD AUTO: 3.34 10*6/MM3 (ref 4.14–5.8)
SODIUM SERPL-SCNC: 140 MMOL/L (ref 136–145)
TIBC SERPL-MCNC: 359 MCG/DL (ref 298–536)
TRANSFERRIN SERPL-MCNC: 241 MG/DL (ref 200–360)
URATE SERPL-MCNC: 6.2 MG/DL (ref 3.4–7)
WBC # BLD AUTO: 8.02 10*3/MM3 (ref 3.4–10.8)

## 2020-12-22 PROCEDURE — 84466 ASSAY OF TRANSFERRIN: CPT

## 2020-12-22 PROCEDURE — 84550 ASSAY OF BLOOD/URIC ACID: CPT

## 2020-12-22 PROCEDURE — 85027 COMPLETE CBC AUTOMATED: CPT

## 2020-12-22 PROCEDURE — 83540 ASSAY OF IRON: CPT

## 2020-12-22 PROCEDURE — 80069 RENAL FUNCTION PANEL: CPT

## 2020-12-22 PROCEDURE — 36415 COLL VENOUS BLD VENIPUNCTURE: CPT

## 2020-12-22 PROCEDURE — 83970 ASSAY OF PARATHORMONE: CPT

## 2021-03-02 ENCOUNTER — TRANSCRIBE ORDERS (OUTPATIENT)
Dept: LAB | Facility: HOSPITAL | Age: 86
End: 2021-03-02

## 2021-03-02 ENCOUNTER — LAB (OUTPATIENT)
Dept: LAB | Facility: HOSPITAL | Age: 86
End: 2021-03-02

## 2021-03-02 DIAGNOSIS — E03.9 MYXEDEMA HEART DISEASE: Primary | ICD-10-CM

## 2021-03-02 DIAGNOSIS — E03.9 MYXEDEMA HEART DISEASE: ICD-10-CM

## 2021-03-02 DIAGNOSIS — I51.9 MYXEDEMA HEART DISEASE: Primary | ICD-10-CM

## 2021-03-02 DIAGNOSIS — I51.9 MYXEDEMA HEART DISEASE: ICD-10-CM

## 2021-03-02 LAB
DEPRECATED RDW RBC AUTO: 44.4 FL (ref 37–54)
EOSINOPHIL # BLD MANUAL: 1.42 10*3/MM3 (ref 0–0.4)
EOSINOPHIL NFR BLD MANUAL: 22.1 % (ref 0.3–6.2)
ERYTHROCYTE [DISTWIDTH] IN BLOOD BY AUTOMATED COUNT: 12.9 % (ref 12.3–15.4)
HCT VFR BLD AUTO: 32.3 % (ref 37.5–51)
HGB BLD-MCNC: 10.1 G/DL (ref 13–17.7)
LYMPHOCYTES # BLD MANUAL: 0.74 10*3/MM3 (ref 0.7–3.1)
LYMPHOCYTES NFR BLD MANUAL: 1.2 % (ref 5–12)
LYMPHOCYTES NFR BLD MANUAL: 11.6 % (ref 19.6–45.3)
MCH RBC QN AUTO: 29.3 PG (ref 26.6–33)
MCHC RBC AUTO-ENTMCNC: 31.3 G/DL (ref 31.5–35.7)
MCV RBC AUTO: 93.6 FL (ref 79–97)
MONOCYTES # BLD AUTO: 0.08 10*3/MM3 (ref 0.1–0.9)
NEUTROPHILS # BLD AUTO: 4.18 10*3/MM3 (ref 1.7–7)
NEUTROPHILS NFR BLD MANUAL: 65.1 % (ref 42.7–76)
PLAT MORPH BLD: NORMAL
PLATELET # BLD AUTO: 146 10*3/MM3 (ref 140–450)
PMV BLD AUTO: 11.4 FL (ref 6–12)
POIKILOCYTOSIS BLD QL SMEAR: ABNORMAL
RBC # BLD AUTO: 3.45 10*6/MM3 (ref 4.14–5.8)
TSH SERPL DL<=0.05 MIU/L-ACNC: 5.87 UIU/ML (ref 0.27–4.2)
WBC # BLD AUTO: 6.42 10*3/MM3 (ref 3.4–10.8)
WBC MORPH BLD: NORMAL

## 2021-03-02 PROCEDURE — 84443 ASSAY THYROID STIM HORMONE: CPT

## 2021-03-02 PROCEDURE — 85025 COMPLETE CBC W/AUTO DIFF WBC: CPT

## 2021-03-02 PROCEDURE — 85007 BL SMEAR W/DIFF WBC COUNT: CPT

## 2021-03-02 PROCEDURE — 36415 COLL VENOUS BLD VENIPUNCTURE: CPT

## 2021-05-04 ENCOUNTER — LAB (OUTPATIENT)
Dept: LAB | Facility: HOSPITAL | Age: 86
End: 2021-05-04

## 2021-05-04 ENCOUNTER — TRANSCRIBE ORDERS (OUTPATIENT)
Dept: LAB | Facility: HOSPITAL | Age: 86
End: 2021-05-04

## 2021-05-04 DIAGNOSIS — N18.32 CHRONIC KIDNEY DISEASE (CKD) STAGE G3B/A1, MODERATELY DECREASED GLOMERULAR FILTRATION RATE (GFR) BETWEEN 30-44 ML/MIN/1.73 SQUARE METER AND ALBUMINURIA CREATININE RATIO LESS THAN 30 MG/G (CMS/H* (HCC): Primary | ICD-10-CM

## 2021-05-04 DIAGNOSIS — N18.32 CHRONIC KIDNEY DISEASE (CKD) STAGE G3B/A1, MODERATELY DECREASED GLOMERULAR FILTRATION RATE (GFR) BETWEEN 30-44 ML/MIN/1.73 SQUARE METER AND ALBUMINURIA CREATININE RATIO LESS THAN 30 MG/G (CMS/H* (HCC): ICD-10-CM

## 2021-05-04 LAB
ALBUMIN SERPL-MCNC: 4 G/DL (ref 3.5–5.2)
ANION GAP SERPL CALCULATED.3IONS-SCNC: 10.2 MMOL/L (ref 5–15)
BUN SERPL-MCNC: 38 MG/DL (ref 8–23)
BUN/CREAT SERPL: 23.5 (ref 7–25)
CALCIUM SPEC-SCNC: 8.8 MG/DL (ref 8.6–10.5)
CHLORIDE SERPL-SCNC: 109 MMOL/L (ref 98–107)
CO2 SERPL-SCNC: 19.8 MMOL/L (ref 22–29)
CREAT SERPL-MCNC: 1.62 MG/DL (ref 0.76–1.27)
DEPRECATED RDW RBC AUTO: 46.5 FL (ref 37–54)
ERYTHROCYTE [DISTWIDTH] IN BLOOD BY AUTOMATED COUNT: 13.5 % (ref 12.3–15.4)
FERRITIN SERPL-MCNC: 138 NG/ML (ref 30–400)
GFR SERPL CREATININE-BSD FRML MDRD: 40 ML/MIN/1.73
GLUCOSE SERPL-MCNC: 95 MG/DL (ref 65–99)
HCT VFR BLD AUTO: 31.6 % (ref 37.5–51)
HGB BLD-MCNC: 10.2 G/DL (ref 13–17.7)
IRON 24H UR-MRATE: 62 MCG/DL (ref 59–158)
IRON SATN MFR SERPL: 20 % (ref 20–50)
MCH RBC QN AUTO: 30.6 PG (ref 26.6–33)
MCHC RBC AUTO-ENTMCNC: 32.3 G/DL (ref 31.5–35.7)
MCV RBC AUTO: 94.9 FL (ref 79–97)
PHOSPHATE SERPL-MCNC: 3.2 MG/DL (ref 2.5–4.5)
PLATELET # BLD AUTO: 120 10*3/MM3 (ref 140–450)
PMV BLD AUTO: 11.7 FL (ref 6–12)
POTASSIUM SERPL-SCNC: 4.8 MMOL/L (ref 3.5–5.2)
PTH-INTACT SERPL-MCNC: 59.8 PG/ML (ref 15–65)
RBC # BLD AUTO: 3.33 10*6/MM3 (ref 4.14–5.8)
SODIUM SERPL-SCNC: 139 MMOL/L (ref 136–145)
TIBC SERPL-MCNC: 314 MCG/DL (ref 298–536)
TRANSFERRIN SERPL-MCNC: 211 MG/DL (ref 200–360)
WBC # BLD AUTO: 6.47 10*3/MM3 (ref 3.4–10.8)

## 2021-05-04 PROCEDURE — 85027 COMPLETE CBC AUTOMATED: CPT

## 2021-05-04 PROCEDURE — 82728 ASSAY OF FERRITIN: CPT

## 2021-05-04 PROCEDURE — 36415 COLL VENOUS BLD VENIPUNCTURE: CPT

## 2021-05-04 PROCEDURE — 80069 RENAL FUNCTION PANEL: CPT

## 2021-05-04 PROCEDURE — 83540 ASSAY OF IRON: CPT

## 2021-05-04 PROCEDURE — 84466 ASSAY OF TRANSFERRIN: CPT

## 2021-05-04 PROCEDURE — 83970 ASSAY OF PARATHORMONE: CPT

## 2021-05-17 ENCOUNTER — TRANSCRIBE ORDERS (OUTPATIENT)
Dept: LAB | Facility: HOSPITAL | Age: 86
End: 2021-05-17

## 2021-05-17 DIAGNOSIS — E78.5 HYPERLIPIDEMIA, UNSPECIFIED HYPERLIPIDEMIA TYPE: Primary | ICD-10-CM

## 2021-06-08 ENCOUNTER — TRANSCRIBE ORDERS (OUTPATIENT)
Dept: LAB | Facility: HOSPITAL | Age: 86
End: 2021-06-08

## 2021-06-08 DIAGNOSIS — E03.9 HYPOTHYROIDISM, UNSPECIFIED TYPE: Primary | ICD-10-CM

## 2021-10-05 ENCOUNTER — TRANSCRIBE ORDERS (OUTPATIENT)
Dept: LAB | Facility: HOSPITAL | Age: 86
End: 2021-10-05

## 2021-10-05 DIAGNOSIS — Z12.5 ENCOUNTER FOR SCREENING FOR MALIGNANT NEOPLASM OF PROSTATE: ICD-10-CM

## 2021-10-05 DIAGNOSIS — E78.5 HYPERLIPIDEMIA, UNSPECIFIED HYPERLIPIDEMIA TYPE: Primary | ICD-10-CM

## 2021-10-05 DIAGNOSIS — E03.9 HYPOTHYROIDISM, UNSPECIFIED TYPE: ICD-10-CM

## 2021-10-19 ENCOUNTER — LAB (OUTPATIENT)
Dept: LAB | Facility: HOSPITAL | Age: 86
End: 2021-10-19

## 2021-10-19 ENCOUNTER — TRANSCRIBE ORDERS (OUTPATIENT)
Dept: LAB | Facility: HOSPITAL | Age: 86
End: 2021-10-19

## 2021-10-19 DIAGNOSIS — I25.10 DISEASE OF CARDIOVASCULAR SYSTEM: ICD-10-CM

## 2021-10-19 DIAGNOSIS — E78.5 HYPERLIPIDEMIA, UNSPECIFIED HYPERLIPIDEMIA TYPE: ICD-10-CM

## 2021-10-19 DIAGNOSIS — Z12.5 ENCOUNTER FOR SCREENING FOR MALIGNANT NEOPLASM OF PROSTATE: ICD-10-CM

## 2021-10-19 DIAGNOSIS — IMO0002 VITAMIN DISEASE: ICD-10-CM

## 2021-10-19 DIAGNOSIS — I10 ESSENTIAL HYPERTENSION, MALIGNANT: ICD-10-CM

## 2021-10-19 DIAGNOSIS — E03.9 HYPOTHYROIDISM, UNSPECIFIED TYPE: ICD-10-CM

## 2021-10-19 DIAGNOSIS — E78.00 PURE HYPERCHOLESTEROLEMIA: ICD-10-CM

## 2021-10-19 DIAGNOSIS — E11.9 DIABETES MELLITUS WITHOUT COMPLICATION (HCC): ICD-10-CM

## 2021-10-19 DIAGNOSIS — E11.9 DIABETES MELLITUS WITHOUT COMPLICATION (HCC): Primary | ICD-10-CM

## 2021-10-19 LAB
25(OH)D3 SERPL-MCNC: 27.4 NG/ML
ALBUMIN SERPL-MCNC: 4.2 G/DL (ref 3.5–5.2)
ALBUMIN UR-MCNC: 3 MG/DL
ALBUMIN/GLOB SERPL: 1.5 G/DL
ALP SERPL-CCNC: 67 U/L (ref 39–117)
ALT SERPL W P-5'-P-CCNC: 10 U/L (ref 1–41)
ANION GAP SERPL CALCULATED.3IONS-SCNC: 10 MMOL/L (ref 5–15)
AST SERPL-CCNC: 14 U/L (ref 1–40)
BASOPHILS # BLD MANUAL: 0.07 10*3/MM3 (ref 0–0.2)
BASOPHILS NFR BLD AUTO: 1 % (ref 0–1.5)
BILIRUB CONJ SERPL-MCNC: 0.2 MG/DL (ref 0–0.3)
BILIRUB SERPL-MCNC: 0.6 MG/DL (ref 0–1.2)
BUN SERPL-MCNC: 40 MG/DL (ref 8–23)
BUN/CREAT SERPL: 25.6 (ref 7–25)
CALCIUM SPEC-SCNC: 8.8 MG/DL (ref 8.6–10.5)
CHLORIDE SERPL-SCNC: 107 MMOL/L (ref 98–107)
CHOLEST SERPL-MCNC: 118 MG/DL (ref 0–200)
CO2 SERPL-SCNC: 22 MMOL/L (ref 22–29)
CREAT SERPL-MCNC: 1.56 MG/DL (ref 0.76–1.27)
CREAT UR-MCNC: 74.5 MG/DL
DEPRECATED RDW RBC AUTO: 45.3 FL (ref 37–54)
EOSINOPHIL # BLD MANUAL: 1.55 10*3/MM3 (ref 0–0.4)
EOSINOPHIL NFR BLD MANUAL: 23.2 % (ref 0.3–6.2)
ERYTHROCYTE [DISTWIDTH] IN BLOOD BY AUTOMATED COUNT: 13.1 % (ref 12.3–15.4)
GFR SERPL CREATININE-BSD FRML MDRD: 42 ML/MIN/1.73
GLOBULIN UR ELPH-MCNC: 2.8 GM/DL
GLUCOSE SERPL-MCNC: 100 MG/DL (ref 65–99)
HBA1C MFR BLD: 5.45 % (ref 4.8–5.6)
HCT VFR BLD AUTO: 31.6 % (ref 37.5–51)
HDLC SERPL-MCNC: 39 MG/DL (ref 40–60)
HGB BLD-MCNC: 10.4 G/DL (ref 13–17.7)
LDLC SERPL CALC-MCNC: 62 MG/DL (ref 0–100)
LDLC/HDLC SERPL: 1.58 {RATIO}
LYMPHOCYTES # BLD MANUAL: 0.68 10*3/MM3 (ref 0.7–3.1)
LYMPHOCYTES NFR BLD MANUAL: 10.1 % (ref 19.6–45.3)
LYMPHOCYTES NFR BLD MANUAL: 3 % (ref 5–12)
MAGNESIUM SERPL-MCNC: 2.1 MG/DL (ref 1.6–2.4)
MCH RBC QN AUTO: 30.9 PG (ref 26.6–33)
MCHC RBC AUTO-ENTMCNC: 32.9 G/DL (ref 31.5–35.7)
MCV RBC AUTO: 93.8 FL (ref 79–97)
MICROALBUMIN/CREAT UR: 40.3 MG/G
MONOCYTES # BLD AUTO: 0.2 10*3/MM3 (ref 0.1–0.9)
NEUTROPHILS # BLD AUTO: 4.19 10*3/MM3 (ref 1.7–7)
NEUTROPHILS NFR BLD MANUAL: 62.6 % (ref 42.7–76)
NRBC BLD AUTO-RTO: 0 /100 WBC (ref 0–0.2)
PLAT MORPH BLD: NORMAL
PLATELET # BLD AUTO: 132 10*3/MM3 (ref 140–450)
PMV BLD AUTO: 10.7 FL (ref 6–12)
POTASSIUM SERPL-SCNC: 5.2 MMOL/L (ref 3.5–5.2)
PROT SERPL-MCNC: 7 G/DL (ref 6–8.5)
PSA SERPL-MCNC: 0.7 NG/ML (ref 0–4)
RBC # BLD AUTO: 3.37 10*6/MM3 (ref 4.14–5.8)
RBC MORPH BLD: NORMAL
SODIUM SERPL-SCNC: 139 MMOL/L (ref 136–145)
TRIGL SERPL-MCNC: 86 MG/DL (ref 0–150)
TSH SERPL DL<=0.05 MIU/L-ACNC: 2.61 UIU/ML (ref 0.27–4.2)
VIT B12 BLD-MCNC: >2000 PG/ML (ref 211–946)
VLDLC SERPL-MCNC: 17 MG/DL (ref 5–40)
WBC # BLD AUTO: 6.69 10*3/MM3 (ref 3.4–10.8)
WBC MORPH BLD: NORMAL

## 2021-10-19 PROCEDURE — G0103 PSA SCREENING: HCPCS

## 2021-10-19 PROCEDURE — 82248 BILIRUBIN DIRECT: CPT

## 2021-10-19 PROCEDURE — 82570 ASSAY OF URINE CREATININE: CPT

## 2021-10-19 PROCEDURE — 80053 COMPREHEN METABOLIC PANEL: CPT

## 2021-10-19 PROCEDURE — 85025 COMPLETE CBC W/AUTO DIFF WBC: CPT

## 2021-10-19 PROCEDURE — 83036 HEMOGLOBIN GLYCOSYLATED A1C: CPT

## 2021-10-19 PROCEDURE — 36415 COLL VENOUS BLD VENIPUNCTURE: CPT

## 2021-10-19 PROCEDURE — 82306 VITAMIN D 25 HYDROXY: CPT

## 2021-10-19 PROCEDURE — 82607 VITAMIN B-12: CPT

## 2021-10-19 PROCEDURE — 82043 UR ALBUMIN QUANTITATIVE: CPT

## 2021-10-19 PROCEDURE — 85007 BL SMEAR W/DIFF WBC COUNT: CPT

## 2021-10-19 PROCEDURE — 84443 ASSAY THYROID STIM HORMONE: CPT

## 2021-10-19 PROCEDURE — 80061 LIPID PANEL: CPT

## 2021-10-19 PROCEDURE — 83735 ASSAY OF MAGNESIUM: CPT

## 2022-04-05 ENCOUNTER — TRANSCRIBE ORDERS (OUTPATIENT)
Dept: LAB | Facility: HOSPITAL | Age: 87
End: 2022-04-05

## 2022-04-05 ENCOUNTER — LAB (OUTPATIENT)
Dept: LAB | Facility: HOSPITAL | Age: 87
End: 2022-04-05

## 2022-04-05 DIAGNOSIS — E78.5 HYPERLIPIDEMIA, UNSPECIFIED HYPERLIPIDEMIA TYPE: ICD-10-CM

## 2022-04-05 DIAGNOSIS — E78.5 HYPERLIPIDEMIA, UNSPECIFIED HYPERLIPIDEMIA TYPE: Primary | ICD-10-CM

## 2022-04-05 LAB
ALBUMIN SERPL-MCNC: 4.1 G/DL (ref 3.5–5.2)
ALBUMIN/GLOB SERPL: 1.5 G/DL
ALP SERPL-CCNC: 78 U/L (ref 39–117)
ALT SERPL W P-5'-P-CCNC: 9 U/L (ref 1–41)
ANION GAP SERPL CALCULATED.3IONS-SCNC: 10.7 MMOL/L (ref 5–15)
AST SERPL-CCNC: 17 U/L (ref 1–40)
BASOPHILS # BLD AUTO: 0.03 10*3/MM3 (ref 0–0.2)
BASOPHILS NFR BLD AUTO: 0.3 % (ref 0–1.5)
BILIRUB SERPL-MCNC: 1 MG/DL (ref 0–1.2)
BUN SERPL-MCNC: 42 MG/DL (ref 8–23)
BUN/CREAT SERPL: 22.6 (ref 7–25)
CALCIUM SPEC-SCNC: 9 MG/DL (ref 8.6–10.5)
CHLORIDE SERPL-SCNC: 106 MMOL/L (ref 98–107)
CHOLEST SERPL-MCNC: 107 MG/DL (ref 0–200)
CO2 SERPL-SCNC: 23.3 MMOL/L (ref 22–29)
CREAT SERPL-MCNC: 1.86 MG/DL (ref 0.76–1.27)
DEPRECATED RDW RBC AUTO: 45.7 FL (ref 37–54)
EGFRCR SERPLBLD CKD-EPI 2021: 34.2 ML/MIN/1.73
EOSINOPHIL # BLD AUTO: 1.1 10*3/MM3 (ref 0–0.4)
EOSINOPHIL NFR BLD AUTO: 12 % (ref 0.3–6.2)
ERYTHROCYTE [DISTWIDTH] IN BLOOD BY AUTOMATED COUNT: 13.4 % (ref 12.3–15.4)
GLOBULIN UR ELPH-MCNC: 2.8 GM/DL
GLUCOSE SERPL-MCNC: 128 MG/DL (ref 65–99)
HCT VFR BLD AUTO: 28.8 % (ref 37.5–51)
HDLC SERPL-MCNC: 41 MG/DL (ref 40–60)
HGB BLD-MCNC: 9.2 G/DL (ref 13–17.7)
IMM GRANULOCYTES # BLD AUTO: 0.04 10*3/MM3 (ref 0–0.05)
IMM GRANULOCYTES NFR BLD AUTO: 0.4 % (ref 0–0.5)
LDLC SERPL CALC-MCNC: 52 MG/DL (ref 0–100)
LDLC/HDLC SERPL: 1.31 {RATIO}
LYMPHOCYTES # BLD AUTO: 0.72 10*3/MM3 (ref 0.7–3.1)
LYMPHOCYTES NFR BLD AUTO: 7.9 % (ref 19.6–45.3)
MCH RBC QN AUTO: 29.9 PG (ref 26.6–33)
MCHC RBC AUTO-ENTMCNC: 31.9 G/DL (ref 31.5–35.7)
MCV RBC AUTO: 93.5 FL (ref 79–97)
MONOCYTES # BLD AUTO: 0.7 10*3/MM3 (ref 0.1–0.9)
MONOCYTES NFR BLD AUTO: 7.7 % (ref 5–12)
NEUTROPHILS NFR BLD AUTO: 6.56 10*3/MM3 (ref 1.7–7)
NEUTROPHILS NFR BLD AUTO: 71.7 % (ref 42.7–76)
NRBC BLD AUTO-RTO: 0 /100 WBC (ref 0–0.2)
PLATELET # BLD AUTO: 121 10*3/MM3 (ref 140–450)
PMV BLD AUTO: 11.5 FL (ref 6–12)
POTASSIUM SERPL-SCNC: 4.4 MMOL/L (ref 3.5–5.2)
PROT SERPL-MCNC: 6.9 G/DL (ref 6–8.5)
RBC # BLD AUTO: 3.08 10*6/MM3 (ref 4.14–5.8)
SODIUM SERPL-SCNC: 140 MMOL/L (ref 136–145)
TRIGL SERPL-MCNC: 62 MG/DL (ref 0–150)
VLDLC SERPL-MCNC: 14 MG/DL (ref 5–40)
WBC NRBC COR # BLD: 9.15 10*3/MM3 (ref 3.4–10.8)

## 2022-04-05 PROCEDURE — 36415 COLL VENOUS BLD VENIPUNCTURE: CPT

## 2022-04-05 PROCEDURE — 80061 LIPID PANEL: CPT

## 2022-04-05 PROCEDURE — 85025 COMPLETE CBC W/AUTO DIFF WBC: CPT

## 2022-04-05 PROCEDURE — 80053 COMPREHEN METABOLIC PANEL: CPT

## 2022-04-18 NOTE — TELEPHONE ENCOUNTER
Pt called and does not wish to schedule an apt at this time. I let him know to please call the office if or when he would like an apt with Dr. Rush.   General Sunscreen Counseling: I recommended a broad spectrum sunscreen with a SPF of 30 or higher. Sunscreens should be applied at least 15 minutes prior to expected sun exposure and then every 2 hours after that as long as sun exposure continues. If swimming or exercising, consider applying every 45 minutes to an hour after getting wet or sweating.   I also recommended sun protective clothing and shade when possible. Detail Level: Detailed 3

## 2022-07-15 ENCOUNTER — APPOINTMENT (OUTPATIENT)
Dept: CT IMAGING | Facility: HOSPITAL | Age: 87
End: 2022-07-15

## 2022-07-15 ENCOUNTER — HOSPITAL ENCOUNTER (EMERGENCY)
Facility: HOSPITAL | Age: 87
Discharge: HOME | End: 2022-07-16
Attending: EMERGENCY MEDICINE | Admitting: EMERGENCY MEDICINE

## 2022-07-15 VITALS
HEIGHT: 71 IN | DIASTOLIC BLOOD PRESSURE: 93 MMHG | WEIGHT: 160 LBS | BODY MASS INDEX: 22.4 KG/M2 | SYSTOLIC BLOOD PRESSURE: 157 MMHG | RESPIRATION RATE: 16 BRPM | HEART RATE: 84 BPM | TEMPERATURE: 97.3 F | OXYGEN SATURATION: 97 %

## 2022-07-15 DIAGNOSIS — S00.03XA CONTUSION OF SCALP, INITIAL ENCOUNTER: Primary | ICD-10-CM

## 2022-07-15 PROCEDURE — 99282 EMERGENCY DEPT VISIT SF MDM: CPT

## 2022-07-15 PROCEDURE — 70450 CT HEAD/BRAIN W/O DYE: CPT

## 2022-07-15 PROCEDURE — 72125 CT NECK SPINE W/O DYE: CPT

## 2022-07-15 RX ORDER — TAMSULOSIN HYDROCHLORIDE 0.4 MG/1
CAPSULE ORAL
COMMUNITY

## 2022-07-15 RX ORDER — LEVOTHYROXINE SODIUM 0.1 MG/1
TABLET ORAL
COMMUNITY
Start: 2022-05-16 | End: 2022-10-18 | Stop reason: SDUPTHER

## 2022-07-15 RX ORDER — MONTELUKAST SODIUM 10 MG/1
TABLET ORAL
COMMUNITY

## 2022-07-15 RX ORDER — SIMVASTATIN 40 MG
TABLET ORAL
COMMUNITY

## 2022-07-15 RX ORDER — LEVOTHYROXINE SODIUM 88 UG/1
TABLET ORAL
COMMUNITY
End: 2022-10-18 | Stop reason: DRUGHIGH

## 2022-07-16 NOTE — ED PROVIDER NOTES
Subjective   Chief Complaint: Headache, ground-level fall    History of Present Illness: This is an 89-year-old male patient comes into the ED accompanied by his wife with complaints of a headache after having a ground-level fall with positive loss of consciousness.  Patient states that he got out of bed felt a little dizzy when he turned fell hitting the back of his head on the bed frame.  Patient states he is on Eliquis for his heart.    Nurses Notes reviewed and agree, including vitals, allergies, social history and prior medical history.                Review of Systems   Neurological: Positive for weakness and headaches.   All other systems reviewed and are negative.      Past Medical History:   Diagnosis Date   • Arthritis    • Asthma    • COPD (chronic obstructive pulmonary disease) (HCC)    • H/O emphysema    • Hiatal hernia    • Hyperlipidemia    • Hypertension    • Thyroid disease        Allergies   Allergen Reactions   • Atorvastatin Unknown (See Comments)       Past Surgical History:   Procedure Laterality Date   • ABDOMINAL AORTIC ANEURYSM REPAIR     • CARDIAC ELECTROPHYSIOLOGY PROCEDURE N/A 2019    Procedure: Device Implant;  Surgeon: Dom Gallardo MD;  Location: [facility] INVASIVE LOCATION;  Service: Cardiology   • HERNIA REPAIR     • KNEE SURGERY         Family History   Problem Relation Age of Onset   • No Known Problems Mother    • No Known Problems Father    • Bone cancer Sister    • Lung cancer Brother        Social History     Socioeconomic History   • Marital status:    • Number of children: 2   Tobacco Use   • Smoking status: Former Smoker     Packs/day: 1.50     Years: 30.00     Pack years: 45.00     Types: Cigarettes     Quit date: 3/5/1998     Years since quittin.3   • Smokeless tobacco: Never Used   Substance and Sexual Activity   • Alcohol use: No   • Drug use: No   • Sexual activity: Defer           Objective   Physical Exam  Vitals and nursing note reviewed.  "  Constitutional:       Appearance: Normal appearance.   HENT:      Head: Normocephalic and atraumatic.   Eyes:      Extraocular Movements: Extraocular movements intact.      Pupils: Pupils are equal, round, and reactive to light.   Cardiovascular:      Rate and Rhythm: Normal rate and regular rhythm.      Pulses: Normal pulses.      Heart sounds: Normal heart sounds.   Pulmonary:      Effort: Pulmonary effort is normal.      Breath sounds: Normal breath sounds.   Abdominal:      General: Bowel sounds are normal.      Palpations: Abdomen is soft.   Musculoskeletal:         General: Normal range of motion.      Cervical back: Normal range of motion and neck supple.   Skin:     General: Skin is warm and dry.      Capillary Refill: Capillary refill takes less than 2 seconds.          Neurological:      Mental Status: He is alert and oriented to person, place, and time. Mental status is at baseline.      GCS: GCS eye subscore is 4. GCS verbal subscore is 5. GCS motor subscore is 6.      Cranial Nerves: Cranial nerves are intact.      Sensory: Sensation is intact.      Motor: Motor function is intact.   Psychiatric:         Attention and Perception: Attention and perception normal.         Mood and Affect: Mood and affect normal.         Speech: Speech normal.         Procedures           ED Course      /93 (BP Location: Right arm, Patient Position: Sitting)   Pulse 84   Temp 97.3 °F (36.3 °C) (Oral)   Resp 16   Ht 180.3 cm (71\")   Wt 72.6 kg (160 lb)   SpO2 97%   BMI 22.32 kg/m²        CT Head Without Contrast    Result Date: 7/16/2022  FINAL REPORT TECHNIQUE: Multiple axial CT images were performed from the foramen magnum to the vertex. This study was performed with techniques to keep radiation doses as low as reasonably achievable (ALARA). Individualized dose reduction techniques using automated exposure control or adjustment of mA and/or kV according to the patient's size were employed. CLINICAL HISTORY: " Ground-level fall with anticoagulant elation FINDINGS: There are small posterior scalp contusions.  There is mild generalized cerebral atrophy.  There is decreased attenuation in the white matter, consistent with mild small vessel chronic ischemic change.  The ventricles are enlarged.  There is no evidence of edema or hemorrhage.  No masses are identified.  No extra-axial fluid is seen.  The paranasal sinuses are unremarkable.     Impression: Atrophy and chronic changes without acute process.  Small posterior scalp contusions. Authenticated and Electronically Signed by Garo Black MD on 07/16/2022 12:02:37 AM    CT Cervical Spine Without Contrast    Result Date: 7/15/2022  FINAL REPORT TECHNIQUE: Axial CT images were obtained from the skull base to the thoracic inlet.  Coronal and sagittal reformatted images were generated from the axial data set.  This study was performed with techniques to keep radiation doses as low as reasonably achievable (ALARA). Individualized dose reduction techniques using automated exposure control or adjustment of mA and/or kV according to the patient's size were employed. CLINICAL HISTORY: Ground-level fall with coagulation FINDINGS: There is no acute fracture or malalignment.  The facets are normally aligned.  Multilevel degenerative changes are present. No acute paraspinal abnormality.  Limited images of the lung apices are unremarkable.     Impression: No acute fracture or malalignment of the cervical spine. Authenticated and Electronically Signed by Garo Black MD on 07/15/2022 11:44:18 PM                                    [facility]    Final diagnoses:   Contusion of scalp, initial encounter       ED Disposition  ED Disposition     ED Disposition   Discharge    Condition   Stable    Comment   --             Sri Bangura MD  [address] [facility] [address]  [phone]    In 1 week  Follow-up         Medication List      No changes were made to your prescriptions  during this visit.          Ramone Alvarado, APRN  07/16/22 0020

## 2022-08-01 ENCOUNTER — OFFICE VISIT (OUTPATIENT)
Dept: CARDIOLOGY | Facility: CLINIC | Age: 87
End: 2022-08-01

## 2022-08-01 VITALS
DIASTOLIC BLOOD PRESSURE: 74 MMHG | SYSTOLIC BLOOD PRESSURE: 150 MMHG | OXYGEN SATURATION: 99 % | HEART RATE: 70 BPM | WEIGHT: 156 LBS | BODY MASS INDEX: 21.84 KG/M2 | HEIGHT: 71 IN

## 2022-08-01 DIAGNOSIS — I10 PRIMARY HYPERTENSION: ICD-10-CM

## 2022-08-01 DIAGNOSIS — Z95.0 CARDIAC PACEMAKER IN SITU: ICD-10-CM

## 2022-08-01 DIAGNOSIS — I48.0 AF (PAROXYSMAL ATRIAL FIBRILLATION): ICD-10-CM

## 2022-08-01 DIAGNOSIS — I49.5 SSS (SICK SINUS SYNDROME): Primary | ICD-10-CM

## 2022-08-01 DIAGNOSIS — E78.5 HYPERLIPIDEMIA, UNSPECIFIED HYPERLIPIDEMIA TYPE: ICD-10-CM

## 2022-08-01 DIAGNOSIS — N18.30 STAGE 3 CHRONIC KIDNEY DISEASE, UNSPECIFIED WHETHER STAGE 3A OR 3B CKD: ICD-10-CM

## 2022-08-01 PROCEDURE — 93280 PM DEVICE PROGR EVAL DUAL: CPT | Performed by: INTERNAL MEDICINE

## 2022-08-01 PROCEDURE — 99204 OFFICE O/P NEW MOD 45 MIN: CPT | Performed by: INTERNAL MEDICINE

## 2022-08-01 PROCEDURE — 93000 ELECTROCARDIOGRAM COMPLETE: CPT | Performed by: INTERNAL MEDICINE

## 2022-08-01 RX ORDER — LEVOTHYROXINE SODIUM 0.1 MG/1
TABLET ORAL
COMMUNITY

## 2022-08-01 RX ORDER — IRBESARTAN 150 MG/1
TABLET ORAL
COMMUNITY

## 2022-08-01 RX ORDER — ASPIRIN 81 MG/1
81 TABLET ORAL DAILY
COMMUNITY

## 2022-08-01 NOTE — PROGRESS NOTES
[facility] OP Consult Note    Jonathan Brown  [id number]  2022    Referred By: No ref. provider found    Chief Complaint: Reestablish cardiac care    History of Present Illness:   Mr. Jonathan Brown is a 89 y.o. male who presents to the Cardiology Clinic to reestablish cardiac care.  The patient has a past medical history significant for hypertension, hyperlipidemia, and an abdominal aortic aneurysm status post endovascular repair.  He has a past cardiac history significant for paroxysmal atrial fibrillation and sick sinus syndrome, for which she has previously undergone placement of a [employer] dual-chamber pacemaker in 2019.  He presents the cardiology clinic today to reestablish cardiac care.  Currently, the patient reports he is doing well from a cardiac standpoint.  He denies chest pain or exertional chest discomfort.  He has not had any recurrent presyncopal or syncopal events after having his pacemaker implanted.  No significant palpitations.  He continues to tolerate Eliquis, however does note right neck bruising after recently sustaining a fall.  No other specific complaints today.    Past Cardiac Testin. Last Coronary Angio: None  2. Prior Stress Testing: None  3. Last Echo:    1.  Hyperdynamic LV systolic function, LVEF 74%   2.  Mild to moderate concentric LVH   3.  Mild to moderate AI and TR  4. Prior Holter Monitor: Unknown    Review of Systems:   Review of Systems   Constitutional: Negative for activity change, appetite change, chills, diaphoresis, fatigue, fever, unexpected weight gain and unexpected weight loss.   Eyes: Negative for blurred vision and double vision.   Respiratory: Negative for cough, chest tightness, shortness of breath and wheezing.    Cardiovascular: Negative for chest pain, palpitations and leg swelling.   Gastrointestinal: Negative for abdominal pain, anal bleeding, blood in stool and GERD.   Endocrine: Negative for cold  intolerance and heat intolerance.   Genitourinary: Negative for hematuria.   Neurological: Negative for dizziness, syncope, weakness and light-headedness.   Hematological: Does not bruise/bleed easily.   Psychiatric/Behavioral: Negative for depressed mood and stress. The patient is not nervous/anxious.        Past Medical History:   Past Medical History:   Diagnosis Date   • Anemia    • Arthritis    • Asthma    • Blood clotting disorder (HCC)    • COPD (chronic obstructive pulmonary disease) (HCC)    • H/O emphysema    • Hiatal hernia    • History of blood transfusion    • Hyperlipidemia    • Hypertension    • Kidney infection    • Kidney stones    • Positive TB test    • Thyroid disease        Past Surgical History:   Past Surgical History:   Procedure Laterality Date   • ABDOMINAL AORTIC ANEURYSM REPAIR     • CARDIAC ELECTROPHYSIOLOGY PROCEDURE N/A 2019    Procedure: Device Implant;  Surgeon: Dom Gallardo MD;  Location: [facility] INVASIVE LOCATION;  Service: Cardiology   • HERNIA REPAIR     • KNEE SURGERY         Family History:   Family History   Problem Relation Age of Onset   • No Known Problems Mother    • No Known Problems Father    • Bone cancer Sister    • Lung cancer Brother        Social History:   Social History     Socioeconomic History   • Marital status:    • Number of children: 2   Tobacco Use   • Smoking status: Former Smoker     Packs/day: 1.50     Years: 30.00     Pack years: 45.00     Types: Cigarettes     Quit date: 3/5/1998     Years since quittin.4   • Smokeless tobacco: Never Used   Substance and Sexual Activity   • Alcohol use: No   • Drug use: No   • Sexual activity: Defer       Medications:     Current Outpatient Medications:   •  apixaban (ELIQUIS) 2.5 MG tablet tablet, Take 2.5 mg by mouth Every 12 (Twelve) Hours., Disp: , Rfl:   •  aspirin 81 MG EC tablet, Take 81 mg by mouth Daily., Disp: , Rfl:   •  atorvastatin (LIPITOR) 10 MG tablet, Take 10 mg by mouth  daily., Disp: , Rfl:   •  Cyanocobalamin (VITAMIN B-12 PO), Take  by mouth., Disp: , Rfl:   •  doxazosin (CARDURA) 4 MG tablet, Take 1 tablet by mouth Every Night., Disp: 90 tablet, Rfl: 3  •  finasteride (PROSCAR) 5 MG tablet, Take 1 tablet by mouth Daily., Disp: 90 tablet, Rfl: 3  •  ibuprofen (ADVIL,MOTRIN) 100 MG/5ML suspension, Take  by mouth every 6 (six) hours as needed for mild pain (1-3)., Disp: , Rfl:   •  irbesartan (AVAPRO) 150 MG tablet, irbesartan 150 mg tablet  TAKE 1 TABLET EVERY DAY, Disp: , Rfl:   •  levothyroxine (SYNTHROID, LEVOTHROID) 100 MCG tablet, , Disp: , Rfl:   •  montelukast (SINGULAIR) 10 MG tablet, Take 10 mg by mouth every night., Disp: , Rfl:   •  simvastatin (ZOCOR) 40 MG tablet, , Disp: , Rfl:   •  simvastatin (ZOCOR) 40 MG tablet, simvastatin 40 mg tablet, Disp: , Rfl:   •  tamsulosin (FLOMAX) 0.4 MG capsule 24 hr capsule, TAKE 1 CAPSULE BY MOUTH EVERY NIGHT., Disp: 90 capsule, Rfl: 3  •  tamsulosin (FLOMAX) 0.4 MG capsule 24 hr capsule, tamsulosin 0.4 mg capsule, Disp: , Rfl:   •  tiotropium (SPIRIVA) 18 MCG per inhalation capsule, Spiriva with HandiHaler 18 mcg and inhalation capsules, Disp: , Rfl:   •  vitamin E 400 UNIT capsule, Take 400 Units by mouth Daily., Disp: , Rfl:   •  amLODIPine (NORVASC) 2.5 MG tablet, Take 2.5 mg by mouth Daily., Disp: , Rfl:   •  levothyroxine (SYNTHROID, LEVOTHROID) 100 MCG tablet, levothyroxine 100 mcg tablet  TAKE 1 TABLET EVERY DAY, Disp: , Rfl:   •  levothyroxine (SYNTHROID, LEVOTHROID) 88 MCG tablet, , Disp: , Rfl:   •  levothyroxine (SYNTHROID, LEVOTHROID) 88 MCG tablet, levothyroxine 88 mcg tablet, Disp: , Rfl:   •  montelukast (SINGULAIR) 10 MG tablet, montelukast 10 mg tablet, Disp: , Rfl:   •  tiotropium (SPIRIVA) 18 MCG per inhalation capsule, Place 1 capsule into inhaler and inhale 1 (one) time daily., Disp: , Rfl:   •  valsartan (DIOVAN) 80 MG tablet, Take 80 mg by mouth daily., Disp: , Rfl:   •  valsartan-hydrochlorothiazide  "(DIOVAN-HCT) 320-25 MG per tablet, , Disp: , Rfl:     Allergies:   Allergies   Allergen Reactions   • Atorvastatin Unknown (See Comments)       Physical Exam:  Vital Signs:   Vitals:    08/01/22 1029   BP: 150/74   BP Location: Right arm   Patient Position: Sitting   Cuff Size: Adult   Pulse: 70   SpO2: 99%   Weight: 70.8 kg (156 lb)   Height: 180.3 cm (71\")       Physical Exam  Constitutional:       Appearance: He is not diaphoretic.      Comments: Elderly male in no acute distress   HENT:      Head: Normocephalic and atraumatic.   Cardiovascular:      Rate and Rhythm: Normal rate and regular rhythm.      Heart sounds: No murmur heard.  Pulmonary:      Effort: Pulmonary effort is normal. No respiratory distress.      Breath sounds: Normal breath sounds. No stridor. No wheezing, rhonchi or rales.   Abdominal:      General: Bowel sounds are normal. There is no distension.      Palpations: Abdomen is soft.      Tenderness: There is no abdominal tenderness. There is no guarding or rebound.   Musculoskeletal:         General: No swelling. Normal range of motion.      Cervical back: Neck supple. No tenderness.   Skin:     General: Skin is warm and dry.   Neurological:      General: No focal deficit present.      Mental Status: He is alert and oriented to person, place, and time.   Psychiatric:         Mood and Affect: Mood normal.         Behavior: Behavior normal.         Results Review:   I reviewed the patient's new clinical results.  I personally viewed and interpreted the patient's EKG/Telemetry data      ECG 12 Lead    Date/Time: 8/1/2022 11:56 AM  Performed by: Laith Haro MD  Authorized by: Laith Haro MD   Comparison: not compared with previous ECG   Rhythm comments: Demand atrial pacing.  Rate: normal  QRS axis: normal    Clinical impression: abnormal EKG        DEVICE INTERROGATION    Interrogation date: 8/1/2022  Implant date: 4/19  Device type: Dual-chamber pacemaker  Mode: DDDR    RA pacing: " 84%,  P wave is 1.2 mV with a threshold of 0.5 V at 0.5 msec and an impedance of 310 ohms.  RV pacing: <1%. R wave is 7.7 mV with a threshold of 1.25 V at 0.5 msec and an impedance of 390 ohms.    Battery voltage is 2.99 V  Time to RENETTA is 6 years.       Summary:  Brief episodes of NSVT.  Normal functioning device.        Assessment / Plan:     1. SSS (sick sinus syndrome)   -- Has dual-chamber pacemaker in place  --Adequate functioning on device interrogation today  --6 years to RENETTA  --Follow-up with repeat interrogation in 1 year    2. AF (paroxysmal atrial fibrillation)   -- No significant episodes of A. fib on review of device interrogation  --ECG today shows demand atrial pacing  --Continue Eliquis for CVA prophylaxis    3. Primary hypertension  -- BP well controlled on home BP checks  --Continue current antihypertensives    4. Hyperlipidemia  -- Lipid profile in 4/22 with LDL 52, HDL 41, triglycerides 62  -- Continue statin    5.  Stage III chronic kidney disease  --Creatinine 1.86 and 4/22    Follow Up:   Return in about 1 year (around 8/1/2023).      Thank you for allowing me to participate in the care of your patient. Please to not hesitate to contact me with additional questions or concerns.     LAMAR Haro MD  Interventional Cardiology   08/01/2022  11:01 EDT

## 2022-09-06 ENCOUNTER — TRANSCRIBE ORDERS (OUTPATIENT)
Dept: LAB | Facility: HOSPITAL | Age: 87
End: 2022-09-06

## 2022-09-06 DIAGNOSIS — E78.5 HYPERLIPIDEMIA, UNSPECIFIED HYPERLIPIDEMIA TYPE: ICD-10-CM

## 2022-09-06 DIAGNOSIS — E03.9 HYPOTHYROIDISM, UNSPECIFIED TYPE: Primary | ICD-10-CM

## 2022-09-06 DIAGNOSIS — Z79.01 LONG TERM (CURRENT) USE OF ANTICOAGULANTS: ICD-10-CM

## 2022-10-04 ENCOUNTER — LAB (OUTPATIENT)
Dept: LAB | Facility: HOSPITAL | Age: 87
End: 2022-10-04

## 2022-10-04 DIAGNOSIS — E78.5 HYPERLIPIDEMIA, UNSPECIFIED HYPERLIPIDEMIA TYPE: ICD-10-CM

## 2022-10-04 DIAGNOSIS — Z79.01 LONG TERM (CURRENT) USE OF ANTICOAGULANTS: ICD-10-CM

## 2022-10-04 DIAGNOSIS — E03.9 HYPOTHYROIDISM, UNSPECIFIED TYPE: ICD-10-CM

## 2022-10-04 LAB
ALBUMIN SERPL-MCNC: 4 G/DL (ref 3.5–5.2)
ALBUMIN/GLOB SERPL: 1.3 G/DL
ALP SERPL-CCNC: 88 U/L (ref 39–117)
ALT SERPL W P-5'-P-CCNC: 7 U/L (ref 1–41)
ANION GAP SERPL CALCULATED.3IONS-SCNC: 9 MMOL/L (ref 5–15)
AST SERPL-CCNC: 18 U/L (ref 1–40)
BASOPHILS # BLD AUTO: 0.06 10*3/MM3 (ref 0–0.2)
BASOPHILS NFR BLD AUTO: 0.9 % (ref 0–1.5)
BILIRUB SERPL-MCNC: 0.8 MG/DL (ref 0–1.2)
BUN SERPL-MCNC: 41 MG/DL (ref 8–23)
BUN/CREAT SERPL: 21.7 (ref 7–25)
CALCIUM SPEC-SCNC: 9.5 MG/DL (ref 8.6–10.5)
CHLORIDE SERPL-SCNC: 106 MMOL/L (ref 98–107)
CHOLEST SERPL-MCNC: 125 MG/DL (ref 0–200)
CO2 SERPL-SCNC: 24 MMOL/L (ref 22–29)
CREAT SERPL-MCNC: 1.89 MG/DL (ref 0.76–1.27)
DEPRECATED RDW RBC AUTO: 44.4 FL (ref 37–54)
EGFRCR SERPLBLD CKD-EPI 2021: 33.5 ML/MIN/1.73
EOSINOPHIL # BLD AUTO: 1.73 10*3/MM3 (ref 0–0.4)
EOSINOPHIL NFR BLD AUTO: 24.7 % (ref 0.3–6.2)
ERYTHROCYTE [DISTWIDTH] IN BLOOD BY AUTOMATED COUNT: 13.7 % (ref 12.3–15.4)
GLOBULIN UR ELPH-MCNC: 3.1 GM/DL
GLUCOSE SERPL-MCNC: 98 MG/DL (ref 65–99)
HCT VFR BLD AUTO: 27.6 % (ref 37.5–51)
HDLC SERPL-MCNC: 44 MG/DL (ref 40–60)
HGB BLD-MCNC: 9.3 G/DL (ref 13–17.7)
IMM GRANULOCYTES # BLD AUTO: 0.03 10*3/MM3 (ref 0–0.05)
IMM GRANULOCYTES NFR BLD AUTO: 0.4 % (ref 0–0.5)
LDLC SERPL CALC-MCNC: 63 MG/DL (ref 0–100)
LDLC/HDLC SERPL: 1.4 {RATIO}
LYMPHOCYTES # BLD AUTO: 1.18 10*3/MM3 (ref 0.7–3.1)
LYMPHOCYTES NFR BLD AUTO: 16.9 % (ref 19.6–45.3)
MCH RBC QN AUTO: 30.2 PG (ref 26.6–33)
MCHC RBC AUTO-ENTMCNC: 33.7 G/DL (ref 31.5–35.7)
MCV RBC AUTO: 89.6 FL (ref 79–97)
MONOCYTES # BLD AUTO: 0.5 10*3/MM3 (ref 0.1–0.9)
MONOCYTES NFR BLD AUTO: 7.2 % (ref 5–12)
NEUTROPHILS NFR BLD AUTO: 3.49 10*3/MM3 (ref 1.7–7)
NEUTROPHILS NFR BLD AUTO: 49.9 % (ref 42.7–76)
NRBC BLD AUTO-RTO: 0 /100 WBC (ref 0–0.2)
PLATELET # BLD AUTO: 172 10*3/MM3 (ref 140–450)
PMV BLD AUTO: 10.6 FL (ref 6–12)
POTASSIUM SERPL-SCNC: 4.7 MMOL/L (ref 3.5–5.2)
PROT SERPL-MCNC: 7.1 G/DL (ref 6–8.5)
RBC # BLD AUTO: 3.08 10*6/MM3 (ref 4.14–5.8)
SODIUM SERPL-SCNC: 139 MMOL/L (ref 136–145)
TRIGL SERPL-MCNC: 96 MG/DL (ref 0–150)
TSH SERPL DL<=0.05 MIU/L-ACNC: 4.69 UIU/ML (ref 0.27–4.2)
VLDLC SERPL-MCNC: 18 MG/DL (ref 5–40)
WBC NRBC COR # BLD: 6.99 10*3/MM3 (ref 3.4–10.8)

## 2022-10-04 PROCEDURE — 80053 COMPREHEN METABOLIC PANEL: CPT

## 2022-10-04 PROCEDURE — 84443 ASSAY THYROID STIM HORMONE: CPT

## 2022-10-04 PROCEDURE — 80061 LIPID PANEL: CPT

## 2022-10-04 PROCEDURE — 85025 COMPLETE CBC W/AUTO DIFF WBC: CPT

## 2022-10-04 PROCEDURE — 36415 COLL VENOUS BLD VENIPUNCTURE: CPT

## 2022-10-10 ENCOUNTER — TRANSCRIBE ORDERS (OUTPATIENT)
Dept: ADMINISTRATIVE | Facility: HOSPITAL | Age: 87
End: 2022-10-10

## 2022-10-10 DIAGNOSIS — R10.9 ABDOMINAL PAIN, UNSPECIFIED ABDOMINAL LOCATION: Primary | ICD-10-CM

## 2022-10-18 ENCOUNTER — TELEPHONE (OUTPATIENT)
Dept: GASTROENTEROLOGY | Facility: CLINIC | Age: 87
End: 2022-10-18

## 2022-10-18 ENCOUNTER — OFFICE VISIT (OUTPATIENT)
Dept: GASTROENTEROLOGY | Facility: CLINIC | Age: 87
End: 2022-10-18

## 2022-10-18 VITALS
DIASTOLIC BLOOD PRESSURE: 70 MMHG | WEIGHT: 152 LBS | HEIGHT: 71 IN | SYSTOLIC BLOOD PRESSURE: 124 MMHG | BODY MASS INDEX: 21.28 KG/M2

## 2022-10-18 DIAGNOSIS — R63.4 WEIGHT LOSS: Chronic | ICD-10-CM

## 2022-10-18 DIAGNOSIS — R11.0 NAUSEA: Chronic | ICD-10-CM

## 2022-10-18 DIAGNOSIS — R10.13 EPIGASTRIC PAIN: Primary | Chronic | ICD-10-CM

## 2022-10-18 DIAGNOSIS — K59.00 CONSTIPATION, UNSPECIFIED CONSTIPATION TYPE: Chronic | ICD-10-CM

## 2022-10-18 DIAGNOSIS — D64.9 ANEMIA, UNSPECIFIED TYPE: Chronic | ICD-10-CM

## 2022-10-18 PROCEDURE — 99204 OFFICE O/P NEW MOD 45 MIN: CPT | Performed by: NURSE PRACTITIONER

## 2022-10-18 RX ORDER — POLYETHYLENE GLYCOL 1450
POWDER (GRAM) MISCELLANEOUS
Qty: 500 G | Refills: 3 | Status: SHIPPED | OUTPATIENT
Start: 2022-10-18

## 2022-10-18 RX ORDER — ONDANSETRON 4 MG/1
4 TABLET, FILM COATED ORAL EVERY 8 HOURS PRN
Qty: 30 TABLET | Refills: 1 | Status: SHIPPED | OUTPATIENT
Start: 2022-10-18

## 2022-10-18 RX ORDER — SUCRALFATE ORAL 1 G/10ML
1 SUSPENSION ORAL 4 TIMES DAILY
Qty: 400 ML | Refills: 0 | Status: SHIPPED | OUTPATIENT
Start: 2022-10-18 | End: 2022-10-28

## 2022-10-18 RX ORDER — OMEPRAZOLE 20 MG/1
20 CAPSULE, DELAYED RELEASE ORAL DAILY
COMMUNITY

## 2022-10-18 RX ORDER — FERROUS SULFATE 325(65) MG
325 TABLET ORAL
COMMUNITY

## 2022-10-18 RX ORDER — AMLODIPINE BESYLATE 5 MG/1
5 TABLET ORAL DAILY
COMMUNITY

## 2022-10-18 NOTE — PATIENT INSTRUCTIONS
Antireflux measures: Avoid fried, fatty foods, alcohol, chocolate, coffee, tea,  soft drinks, peppermint and spearmint, spicy foods, tomatoes and tomato based foods, onion based foods, and smoking.  Other antireflux measures include weight reduction if overweight, avoiding tight clothing around the abdomen, elevating the head of the bed 6 inches with blocks under the head board, and don't drink or eat before going to bed and avoid lying down immediately after meals.  Omeprazole 20 mg 1 po daily in the am 30 minutes before breakfast.  Recommended to take Levothyroxine first in the am upon waking, wait 30 minutes, then take Omeprazole 20 mg, wait 30 minutes, then eat breakfast and take other medications.   CT abdomen and pelvis  Zofran 4 mg 1 po every 8 hours as needed for nausea.  Carafate 1 gm/10ml 4x daily x 10 days.  Miralax 17 grams daily.  Discussed colonoscopy and EGD - patient wishes to wait at this time.  Follow up: 3 months or sooner if symptoms worsen

## 2022-10-18 NOTE — PROGRESS NOTES
New Patient Consult      Date: 10/18/2022   Patient Name: Jonathan Brown  MRN: [id number]  : 1933     Primary Care Provider: Sri Bangura MD    Chief Complaint   Patient presents with   • Abdominal Pain     History of Present Illness: Jonathan Brown is a 89 y.o. male who is here today to establish care with gastroenterology for abdominal pain.     He has a history of epigastric pain that started 3-4 months ago that has gradually worsened. Eating makes the pain worse. He has lost about 9 pounds in a month that is unintentional. He has nausea when he starts to eat. Denies vomiting. Denies hematemesis or melena. He just started on Omeprazole 20 mg a day or two ago per PCP office.    He has occasional constipation and takes laxatives a couple of times per month as needed.  He denies diarrhea or rectal bleeding. He has a history of anemia for several years and takes iron supplements off and on. He has stage III kidney disease and only has 1 kidney.     He is on Eliquis since he got his pacemaker 4 years ago. He has not had an EGD in the past. He possibly had a colonoscopy 10 years ago. No family history of GI malignancy. He was seen today with his sister-in-law.    Subjective      Past Medical History:   Diagnosis Date   • Anemia    • Arthritis    • Asthma    • Blood clotting disorder (HCC)    • COPD (chronic obstructive pulmonary disease) (HCC)    • H/O emphysema    • Hiatal hernia    • History of blood transfusion    • Hyperlipidemia    • Hypertension    • Kidney infection    • Kidney stones    • Positive TB test    • Thyroid disease      Past Surgical History:   Procedure Laterality Date   • ABDOMINAL AORTIC ANEURYSM REPAIR     • CARDIAC ELECTROPHYSIOLOGY PROCEDURE N/A 2019    Procedure: Device Implant;  Surgeon: Dom Gallardo MD;  Location: [facility] INVASIVE LOCATION;  Service: Cardiology   • HERNIA REPAIR     • KNEE SURGERY       Family History   Problem Relation Age of  Onset   • No Known Problems Mother    • No Known Problems Father    • Bone cancer Sister    • Lung cancer Brother    • Colon cancer Neg Hx      Social History     Socioeconomic History   • Marital status:    • Number of children: 2   Tobacco Use   • Smoking status: Former     Packs/day: 1.50     Years: 30.00     Pack years: 45.00     Types: Cigarettes     Quit date: 3/5/1998     Years since quittin.6   • Smokeless tobacco: Never   Vaping Use   • Vaping Use: Never used   Substance and Sexual Activity   • Alcohol use: No   • Drug use: No   • Sexual activity: Defer       Current Outpatient Medications:   •  amLODIPine (NORVASC) 5 MG tablet, Take 1 tablet by mouth Daily., Disp: , Rfl:   •  apixaban (ELIQUIS) 2.5 MG tablet tablet, Take 2.5 mg by mouth Every 12 (Twelve) Hours., Disp: , Rfl:   •  aspirin 81 MG EC tablet, Take 81 mg by mouth Daily., Disp: , Rfl:   •  Cyanocobalamin (VITAMIN B-12 PO), Take  by mouth., Disp: , Rfl:   •  doxazosin (CARDURA) 4 MG tablet, Take 1 tablet by mouth Every Night., Disp: 90 tablet, Rfl: 3  •  ferrous sulfate 325 (65 FE) MG tablet, Take 1 tablet by mouth Daily With Breakfast., Disp: , Rfl:   •  finasteride (PROSCAR) 5 MG tablet, Take 1 tablet by mouth Daily., Disp: 90 tablet, Rfl: 3  •  irbesartan (AVAPRO) 150 MG tablet, irbesartan 150 mg tablet  TAKE 1 TABLET EVERY DAY, Disp: , Rfl:   •  levothyroxine (SYNTHROID, LEVOTHROID) 100 MCG tablet, levothyroxine 100 mcg tablet  TAKE 1 TABLET EVERY DAY, Disp: , Rfl:   •  montelukast (SINGULAIR) 10 MG tablet, montelukast 10 mg tablet, Disp: , Rfl:   •  Multiple Vitamins-Minerals (PRESERVISION AREDS 2 PO), Take  by mouth., Disp: , Rfl:   •  omeprazole (priLOSEC) 20 MG capsule, Take 1 capsule by mouth Daily., Disp: , Rfl:   •  simvastatin (ZOCOR) 40 MG tablet, simvastatin 40 mg tablet, Disp: , Rfl:   •  tamsulosin (FLOMAX) 0.4 MG capsule 24 hr capsule, tamsulosin 0.4 mg capsule, Disp: , Rfl:   •  tiotropium (SPIRIVA) 18 MCG per  "inhalation capsule, Spiriva with HandiHaler 18 mcg and inhalation capsules, Disp: , Rfl:   •  ondansetron (ZOFRAN) 4 MG tablet, Take 1 tablet by mouth Every 8 (Eight) Hours As Needed for Nausea or Vomiting., Disp: 30 tablet, Rfl: 1  •  Polyethylene Glycol powder, Take 1 cap full (17 grams) in 8 oz of noncarbonated beverage and drink once daily, Disp: 500 g, Rfl: 3  •  sucralfate (Carafate) 1 GM/10ML suspension, Take 10 mL by mouth 4 (Four) Times a Day for 10 days., Disp: 400 mL, Rfl: 0     Allergies   Allergen Reactions   • Atorvastatin Unknown (See Comments)     The following portions of the patient's history were reviewed and updated as appropriate: allergies, current medications, past family history, past medical history, past social history, past surgical history and problem list.    Objective     Physical Exam  Vitals and nursing note reviewed.   Constitutional:       General: He is not in acute distress.     Appearance: Normal appearance. He is well-developed.   HENT:      Head: Normocephalic and atraumatic.      Mouth/Throat:      Mouth: Mucous membranes are not pale, not dry and not cyanotic.   Eyes:      General: Lids are normal.   Neck:      Trachea: Trachea normal.   Cardiovascular:      Rate and Rhythm: Normal rate.   Pulmonary:      Effort: Pulmonary effort is normal. No respiratory distress.      Breath sounds: Normal breath sounds.   Abdominal:      General: Bowel sounds are normal.      Palpations: Abdomen is soft. There is no mass.      Tenderness: There is no abdominal tenderness.      Hernia: No hernia is present.   Skin:     General: Skin is warm and dry.   Neurological:      Mental Status: He is alert and oriented to person, place, and time.   Psychiatric:         Mood and Affect: Mood normal.         Speech: Speech normal.         Behavior: Behavior normal. Behavior is cooperative.       Vitals:    10/18/22 1034   BP: 124/70   Weight: 68.9 kg (152 lb)   Height: 180.3 cm (71\")     Body mass " index is 21.2 kg/m².     Results Review:   I have reviewed the patient's new clinical and imaging results.    Lab on 10/04/2022   Component Date Value Ref Range Status   • TSH 10/04/2022 4.690 (H)  0.270 - 4.200 uIU/mL Final   • Total Cholesterol 10/04/2022 125  0 - 200 mg/dL Final   • Triglycerides 10/04/2022 96  0 - 150 mg/dL Final   • HDL Cholesterol 10/04/2022 44  40 - 60 mg/dL Final   • LDL Cholesterol  10/04/2022 63  0 - 100 mg/dL Final   • VLDL Cholesterol 10/04/2022 18  5 - 40 mg/dL Final   • LDL/HDL Ratio 10/04/2022 1.40   Final   • Glucose 10/04/2022 98  65 - 99 mg/dL Final   • BUN 10/04/2022 41 (H)  8 - 23 mg/dL Final   • Creatinine 10/04/2022 1.89 (H)  0.76 - 1.27 mg/dL Final   • Sodium 10/04/2022 139  136 - 145 mmol/L Final   • Potassium 10/04/2022 4.7  3.5 - 5.2 mmol/L Final   • Chloride 10/04/2022 106  98 - 107 mmol/L Final   • CO2 10/04/2022 24.0  22.0 - 29.0 mmol/L Final   • Calcium 10/04/2022 9.5  8.6 - 10.5 mg/dL Final   • Total Protein 10/04/2022 7.1  6.0 - 8.5 g/dL Final   • Albumin 10/04/2022 4.00  3.50 - 5.20 g/dL Final   • ALT (SGPT) 10/04/2022 7  1 - 41 U/L Final   • AST (SGOT) 10/04/2022 18  1 - 40 U/L Final   • Alkaline Phosphatase 10/04/2022 88  39 - 117 U/L Final   • Total Bilirubin 10/04/2022 0.8  0.0 - 1.2 mg/dL Final   • Globulin 10/04/2022 3.1  gm/dL Final   • A/G Ratio 10/04/2022 1.3  g/dL Final   • BUN/Creatinine Ratio 10/04/2022 21.7  7.0 - 25.0 Final   • Anion Gap 10/04/2022 9.0  5.0 - 15.0 mmol/L Final   • eGFR 10/04/2022 33.5 (L)  >60.0 mL/min/1.73 Final    National Kidney Foundation and American Society of Nephrology (ASN) Task Force recommended calculation based on the Chronic Kidney Disease Epidemiology Collaboration (CKD-EPI) equation refit without adjustment for race.   • WBC 10/04/2022 6.99  3.40 - 10.80 10*3/mm3 Final   • RBC 10/04/2022 3.08 (L)  4.14 - 5.80 10*6/mm3 Final   • Hemoglobin 10/04/2022 9.3 (L)  13.0 - 17.7 g/dL Final   • Hematocrit 10/04/2022 27.6 (L)  37.5  - 51.0 % Final   • MCV 10/04/2022 89.6  79.0 - 97.0 fL Final   • MCH 10/04/2022 30.2  26.6 - 33.0 pg Final   • MCHC 10/04/2022 33.7  31.5 - 35.7 g/dL Final   • RDW 10/04/2022 13.7  12.3 - 15.4 % Final   • RDW-SD 10/04/2022 44.4  37.0 - 54.0 fl Final   • MPV 10/04/2022 10.6  6.0 - 12.0 fL Final   • Platelets 10/04/2022 172  140 - 450 10*3/mm3 Final   • Neutrophil % 10/04/2022 49.9  42.7 - 76.0 % Final   • Lymphocyte % 10/04/2022 16.9 (L)  19.6 - 45.3 % Final   • Monocyte % 10/04/2022 7.2  5.0 - 12.0 % Final   • Eosinophil % 10/04/2022 24.7 (H)  0.3 - 6.2 % Final   • Basophil % 10/04/2022 0.9  0.0 - 1.5 % Final   • Immature Grans % 10/04/2022 0.4  0.0 - 0.5 % Final   • Neutrophils, Absolute 10/04/2022 3.49  1.70 - 7.00 10*3/mm3 Final   • Lymphocytes, Absolute 10/04/2022 1.18  0.70 - 3.10 10*3/mm3 Final   • Monocytes, Absolute 10/04/2022 0.50  0.10 - 0.90 10*3/mm3 Final   • Eosinophils, Absolute 10/04/2022 1.73 (H)  0.00 - 0.40 10*3/mm3 Final   • Basophils, Absolute 10/04/2022 0.06  0.00 - 0.20 10*3/mm3 Final   • Immature Grans, Absolute 10/04/2022 0.03  0.00 - 0.05 10*3/mm3 Final   • nRBC 10/04/2022 0.0  0.0 - 0.2 /100 WBC Final      No radiology results for the last 90 days.     Assessment / Plan      1. Epigastric pain  2. Nausea  3. Weight loss  He has a history of epigastric pain and nausea that started 3 to 4 months ago that is gradually worsened.  Eating makes symptoms worse.  He has lost about 9 pounds in a month that is unintentional due to not eating to avoid the pain and nausea.  Patient denies hematemesis or melena.  He started on omeprazole 20 mg 1 to 2 days ago.  Recent labs with normocytic anemia.  Liver enzymes normal.  TSH borderline elevated at 4.690.  No abdominal imaging.  Continue omeprazole 20 mg daily.  Zofran 4 mg 1 p.o. every 8 hours as needed for nausea.  Carafate 1 gm/10ml po 4x daily x 10 days.  CTAP to rule out solid organ pathology.     - CT Abdomen Pelvis Without Contrast; Future  -  ondansetron (ZOFRAN) 4 MG tablet; Take 1 tablet by mouth Every 8 (Eight) Hours As Needed for Nausea or Vomiting.  Dispense: 30 tablet; Refill: 1    4. Anemia, unspecified type  He has a history of normocytic anemia for the past few years. The patient denies GI bleeding, no hematemesis, hematochezia or melena. He takes iron supplements off and on. He has a history of stage III kidney disease and followed by nephrology. He possibly had a colonoscopy 10 years ago, has not had an EGD in the past.  Discussed EGD and/or colonoscopy to rule out GI source as he is having epigastric pain, nausea and weight loss. Patient does not want to pursue at this time. He is agreeable to CTAP to rule out solid organ pathology first.    - CT Abdomen Pelvis Without Contrast; Future    5. Constipation, unspecified constipation type  He has a history of occasional constipation and takes Laxatives a few times per month as needed. Denies rectal bleeding. TSH borderline elevated.  Miralax 17 grams daily.  CTAP     - Polyethylene Glycol powder; Take 1 cap full (17 grams) in 8 oz of noncarbonated beverage and drink once daily  Dispense: 500 g; Refill: 3    Patient Instructions   1. Antireflux measures: Avoid fried, fatty foods, alcohol, chocolate, coffee, tea,  soft drinks, peppermint and spearmint, spicy foods, tomatoes and tomato based foods, onion based foods, and smoking.  2. Other antireflux measures include weight reduction if overweight, avoiding tight clothing around the abdomen, elevating the head of the bed 6 inches with blocks under the head board, and don't drink or eat before going to bed and avoid lying down immediately after meals.  3. Omeprazole 20 mg 1 po daily in the am 30 minutes before breakfast.  4. Recommended to take Levothyroxine first in the am upon waking, wait 30 minutes, then take Omeprazole 20 mg, wait 30 minutes, then eat breakfast and take other medications.   5. CT abdomen and pelvis  6. Zofran 4 mg 1 po every 8  hours as needed for nausea.  7. Carafate 1 gm/10ml 4x daily x 10 days.  8. Miralax 17 grams daily.  9. Discussed colonoscopy and EGD - patient wishes to wait at this time.  10. Follow up: 3 months or sooner if symptoms worsen     Estela Montes, APRN  10/18/2022    Please note that portions of this note may have been completed with a voice recognition program. Efforts were made to edit the dictations, but occasionally words are mistranscribed.

## 2022-10-18 NOTE — TELEPHONE ENCOUNTER
----- Message from ZANA Willett sent at 10/18/2022 11:58 AM EDT -----  Can you call his sister in law (she is the main contact/emergency number) and let her know I sent in Carafate liquid he can take 4 times a day x 10 days? This can help with his pain and nausea as well. Thanks!

## 2022-10-24 DIAGNOSIS — R10.13 EPIGASTRIC PAIN: Chronic | ICD-10-CM

## 2022-10-24 DIAGNOSIS — R11.0 NAUSEA: Chronic | ICD-10-CM

## 2022-10-24 RX ORDER — SUCRALFATE ORAL 1 G/10ML
SUSPENSION ORAL
Qty: 400 ML | Refills: 0 | OUTPATIENT
Start: 2022-10-24

## 2022-10-31 ENCOUNTER — HOSPITAL ENCOUNTER (OUTPATIENT)
Dept: ULTRASOUND IMAGING | Facility: HOSPITAL | Age: 87
Discharge: HOME | End: 2022-10-31
Admitting: FAMILY MEDICINE

## 2022-10-31 DIAGNOSIS — R10.9 ABDOMINAL PAIN, UNSPECIFIED ABDOMINAL LOCATION: ICD-10-CM

## 2022-10-31 PROCEDURE — 76705 ECHO EXAM OF ABDOMEN: CPT

## 2022-11-01 ENCOUNTER — HOSPITAL ENCOUNTER (OUTPATIENT)
Dept: CT IMAGING | Facility: HOSPITAL | Age: 87
Discharge: HOME | End: 2022-11-01
Admitting: NURSE PRACTITIONER

## 2022-11-01 ENCOUNTER — TELEPHONE (OUTPATIENT)
Dept: GASTROENTEROLOGY | Facility: CLINIC | Age: 87
End: 2022-11-01

## 2022-11-01 DIAGNOSIS — R10.13 EPIGASTRIC PAIN: Chronic | ICD-10-CM

## 2022-11-01 DIAGNOSIS — D64.9 ANEMIA, UNSPECIFIED TYPE: Chronic | ICD-10-CM

## 2022-11-01 DIAGNOSIS — R11.0 NAUSEA: Chronic | ICD-10-CM

## 2022-11-01 PROCEDURE — 74176 CT ABD & PELVIS W/O CONTRAST: CPT

## 2022-11-01 NOTE — TELEPHONE ENCOUNTER
----- Message from ZANA Willett sent at 11/1/2022  4:31 PM EDT -----  Can you call his sister in law (she is his emergency contact and he wants her called as he can't hear) and let her know there is no acute findings to cause his symptoms. He has chronic changes, such as emphysema, coronary artery disease and aortic atherosclerosis, which can be discussed with his primary care provider.

## 2022-11-02 ENCOUNTER — TELEPHONE (OUTPATIENT)
Dept: GASTROENTEROLOGY | Facility: CLINIC | Age: 87
End: 2022-11-02

## 2023-01-12 ENCOUNTER — TELEPHONE (OUTPATIENT)
Dept: CARDIOLOGY | Facility: CLINIC | Age: 88
End: 2023-01-12
Payer: MEDICARE

## 2023-01-12 NOTE — TELEPHONE ENCOUNTER
[facility] CALLED TO SEE IF PATIENT COULD HOLD ELIQUIS FOR 72 HOURS DUE TO AN EAR INJURY. PER URSULA JAMES TO HOLD ELIQUIS.

## 2023-04-04 ENCOUNTER — LAB (OUTPATIENT)
Dept: LAB | Facility: HOSPITAL | Age: 88
End: 2023-04-04
Payer: MEDICARE

## 2023-04-04 ENCOUNTER — TRANSCRIBE ORDERS (OUTPATIENT)
Dept: LAB | Facility: HOSPITAL | Age: 88
End: 2023-04-04
Payer: MEDICARE

## 2023-04-04 DIAGNOSIS — E78.5 HYPERLIPIDEMIA, UNSPECIFIED HYPERLIPIDEMIA TYPE: ICD-10-CM

## 2023-04-04 DIAGNOSIS — E78.5 HYPERLIPIDEMIA, UNSPECIFIED HYPERLIPIDEMIA TYPE: Primary | ICD-10-CM

## 2023-04-04 LAB
BASOPHILS # BLD MANUAL: 0.15 10*3/MM3 (ref 0–0.2)
BASOPHILS NFR BLD MANUAL: 2.1 % (ref 0–1.5)
CHOLEST SERPL-MCNC: 98 MG/DL (ref 0–200)
DEPRECATED RDW RBC AUTO: 44.1 FL (ref 37–54)
EOSINOPHIL # BLD MANUAL: 1.87 10*3/MM3 (ref 0–0.4)
EOSINOPHIL NFR BLD MANUAL: 26.8 % (ref 0.3–6.2)
ERYTHROCYTE [DISTWIDTH] IN BLOOD BY AUTOMATED COUNT: 14.4 % (ref 12.3–15.4)
HCT VFR BLD AUTO: 24 % (ref 37.5–51)
HDLC SERPL-MCNC: 39 MG/DL (ref 40–60)
HGB BLD-MCNC: 7.7 G/DL (ref 13–17.7)
LDLC SERPL CALC-MCNC: 45 MG/DL (ref 0–100)
LDLC/HDLC SERPL: 1.21 {RATIO}
LYMPHOCYTES # BLD MANUAL: 0.5 10*3/MM3 (ref 0.7–3.1)
LYMPHOCYTES NFR BLD MANUAL: 9.3 % (ref 5–12)
MCH RBC QN AUTO: 26.9 PG (ref 26.6–33)
MCHC RBC AUTO-ENTMCNC: 32.1 G/DL (ref 31.5–35.7)
MCV RBC AUTO: 83.9 FL (ref 79–97)
MONOCYTES # BLD: 0.65 10*3/MM3 (ref 0.1–0.9)
NEUTROPHILS # BLD AUTO: 3.82 10*3/MM3 (ref 1.7–7)
NEUTROPHILS NFR BLD MANUAL: 54.6 % (ref 42.7–76)
PLAT MORPH BLD: NORMAL
PLATELET # BLD AUTO: 162 10*3/MM3 (ref 140–450)
PMV BLD AUTO: 10.8 FL (ref 6–12)
RBC # BLD AUTO: 2.86 10*6/MM3 (ref 4.14–5.8)
RBC MORPH BLD: NORMAL
TRIGL SERPL-MCNC: 60 MG/DL (ref 0–150)
VARIANT LYMPHS NFR BLD MANUAL: 7.2 % (ref 19.6–45.3)
VLDLC SERPL-MCNC: 14 MG/DL (ref 5–40)
WBC MORPH BLD: NORMAL
WBC NRBC COR # BLD: 6.99 10*3/MM3 (ref 3.4–10.8)

## 2023-04-04 PROCEDURE — 36415 COLL VENOUS BLD VENIPUNCTURE: CPT

## 2023-04-04 PROCEDURE — 85025 COMPLETE CBC W/AUTO DIFF WBC: CPT

## 2023-04-04 PROCEDURE — 80061 LIPID PANEL: CPT

## 2023-04-04 PROCEDURE — 85007 BL SMEAR W/DIFF WBC COUNT: CPT

## 2023-04-11 ENCOUNTER — HOSPITAL ENCOUNTER (EMERGENCY)
Facility: HOSPITAL | Age: 88
Discharge: HOME | End: 2023-04-11
Attending: EMERGENCY MEDICINE | Admitting: EMERGENCY MEDICINE
Payer: MEDICARE

## 2023-04-11 VITALS
WEIGHT: 160 LBS | BODY MASS INDEX: 22.4 KG/M2 | RESPIRATION RATE: 18 BRPM | HEART RATE: 73 BPM | DIASTOLIC BLOOD PRESSURE: 83 MMHG | OXYGEN SATURATION: 97 % | HEIGHT: 71 IN | SYSTOLIC BLOOD PRESSURE: 170 MMHG | TEMPERATURE: 98.5 F

## 2023-04-11 DIAGNOSIS — Z13.9 ENCOUNTER FOR MEDICAL SCREENING EXAMINATION: Primary | ICD-10-CM

## 2023-04-11 DIAGNOSIS — D64.9 CHRONIC ANEMIA: ICD-10-CM

## 2023-04-11 DIAGNOSIS — N18.31 STAGE 3A CHRONIC KIDNEY DISEASE: ICD-10-CM

## 2023-04-11 LAB
ALBUMIN SERPL-MCNC: 4.1 G/DL (ref 3.5–5.2)
ALBUMIN/GLOB SERPL: 1.2 G/DL
ALP SERPL-CCNC: 99 U/L (ref 39–117)
ALT SERPL W P-5'-P-CCNC: 12 U/L (ref 1–41)
ANION GAP SERPL CALCULATED.3IONS-SCNC: 9 MMOL/L (ref 5–15)
AST SERPL-CCNC: 17 U/L (ref 1–40)
BASOPHILS # BLD AUTO: 0.06 10*3/MM3 (ref 0–0.2)
BASOPHILS NFR BLD AUTO: 0.8 % (ref 0–1.5)
BILIRUB SERPL-MCNC: 0.4 MG/DL (ref 0–1.2)
BUN SERPL-MCNC: 40 MG/DL (ref 8–23)
BUN/CREAT SERPL: 18.8 (ref 7–25)
CALCIUM SPEC-SCNC: 9.2 MG/DL (ref 8.2–9.6)
CHLORIDE SERPL-SCNC: 107 MMOL/L (ref 98–107)
CO2 SERPL-SCNC: 21 MMOL/L (ref 22–29)
CREAT SERPL-MCNC: 2.13 MG/DL (ref 0.76–1.27)
DEPRECATED RDW RBC AUTO: 48.9 FL (ref 37–54)
EGFRCR SERPLBLD CKD-EPI 2021: 28.9 ML/MIN/1.73
EOSINOPHIL # BLD AUTO: 2.1 10*3/MM3 (ref 0–0.4)
EOSINOPHIL # BLD MANUAL: 1.92 10*3/MM3 (ref 0–0.4)
EOSINOPHIL NFR BLD AUTO: 26.9 % (ref 0.3–6.2)
EOSINOPHIL NFR BLD MANUAL: 24.5 % (ref 0.3–6.2)
ERYTHROCYTE [DISTWIDTH] IN BLOOD BY AUTOMATED COUNT: 15.5 % (ref 12.3–15.4)
GLOBULIN UR ELPH-MCNC: 3.4 GM/DL
GLUCOSE SERPL-MCNC: 93 MG/DL (ref 65–99)
HCT VFR BLD AUTO: 26.9 % (ref 37.5–51)
HGB BLD-MCNC: 8.2 G/DL (ref 13–17.7)
HOLD SPECIMEN: NORMAL
HOLD SPECIMEN: NORMAL
LYMPHOCYTES # BLD AUTO: 1.14 10*3/MM3 (ref 0.7–3.1)
LYMPHOCYTES # BLD MANUAL: 1.27 10*3/MM3 (ref 0.7–3.1)
LYMPHOCYTES NFR BLD AUTO: 14.6 % (ref 19.6–45.3)
LYMPHOCYTES NFR BLD MANUAL: 8.2 % (ref 5–12)
MCH RBC QN AUTO: 26.5 PG (ref 26.6–33)
MCHC RBC AUTO-ENTMCNC: 30.5 G/DL (ref 31.5–35.7)
MCV RBC AUTO: 86.8 FL (ref 79–97)
MONOCYTES # BLD AUTO: 0.6 10*3/MM3 (ref 0.1–0.9)
MONOCYTES # BLD: 0.64 10*3/MM3 (ref 0.1–0.9)
MONOCYTES NFR BLD AUTO: 7.7 % (ref 5–12)
NEUTROPHILS # BLD AUTO: 3.99 10*3/MM3 (ref 1.7–7)
NEUTROPHILS NFR BLD AUTO: 3.9 10*3/MM3 (ref 1.7–7)
NEUTROPHILS NFR BLD AUTO: 49.7 % (ref 42.7–76)
NEUTROPHILS NFR BLD MANUAL: 51 % (ref 42.7–76)
PLATELET # BLD AUTO: 149 10*3/MM3 (ref 140–450)
PMV BLD AUTO: 10.1 FL (ref 6–12)
POTASSIUM SERPL-SCNC: 4.7 MMOL/L (ref 3.5–5.2)
PROT SERPL-MCNC: 7.5 G/DL (ref 6–8.5)
RBC # BLD AUTO: 3.1 10*6/MM3 (ref 4.14–5.8)
RBC MORPH BLD: NORMAL
SMALL PLATELETS BLD QL SMEAR: ADEQUATE
SODIUM SERPL-SCNC: 137 MMOL/L (ref 136–145)
VARIANT LYMPHS NFR BLD MANUAL: 16.3 % (ref 19.6–45.3)
WBC MORPH BLD: NORMAL
WBC NRBC COR # BLD: 7.82 10*3/MM3 (ref 3.4–10.8)
WHOLE BLOOD HOLD COAG: NORMAL
WHOLE BLOOD HOLD SPECIMEN: NORMAL

## 2023-04-11 PROCEDURE — 80053 COMPREHEN METABOLIC PANEL: CPT | Performed by: EMERGENCY MEDICINE

## 2023-04-11 PROCEDURE — 85025 COMPLETE CBC W/AUTO DIFF WBC: CPT | Performed by: EMERGENCY MEDICINE

## 2023-04-11 PROCEDURE — 99283 EMERGENCY DEPT VISIT LOW MDM: CPT

## 2023-04-11 PROCEDURE — 85007 BL SMEAR W/DIFF WBC COUNT: CPT | Performed by: EMERGENCY MEDICINE

## 2023-04-11 PROCEDURE — 93005 ELECTROCARDIOGRAM TRACING: CPT | Performed by: EMERGENCY MEDICINE

## 2023-04-11 RX ORDER — SODIUM CHLORIDE 0.9 % (FLUSH) 0.9 %
10 SYRINGE (ML) INJECTION AS NEEDED
Status: DISCONTINUED | OUTPATIENT
Start: 2023-04-11 | End: 2023-04-11 | Stop reason: HOSPADM

## 2023-04-11 NOTE — ED PROVIDER NOTES
Subjective  History of Present Illness:    Chief Complaint: Abnormal lab  History of Present Illness: 89-year-old gentleman with a history of chronic renal disease and chronic anemia comes in at the request of primary care physician due to an abnormal lab, it was reported that his hemoglobin was low.  Reviewed previous labs, patient had a hemoglobin on 2023 that showed 7.7.  Patient has no symptoms, he came in at the recommendation of his primary care physician.  Onset: Gradual  Duration: Chronically  Exacerbating / Alleviating factors: No other symptoms  Associated symptoms: No associated symptoms      Nurses Notes reviewed and agree, including vitals, allergies, social history and prior medical history.     REVIEW OF SYSTEMS: All systems reviewed and not pertinent unless noted.    Review of Systems   Constitutional:        Abnormal lab   All other systems reviewed and are negative.      Past Medical History:   Diagnosis Date   • Anemia    • Arthritis    • Asthma    • Blood clotting disorder    • COPD (chronic obstructive pulmonary disease)    • H/O emphysema    • Hiatal hernia    • History of blood transfusion    • Hyperlipidemia    • Hypertension    • Kidney infection    • Kidney stones    • Positive TB test    • Thyroid disease        Allergies:    Atorvastatin      Past Surgical History:   Procedure Laterality Date   • ABDOMINAL AORTIC ANEURYSM REPAIR     • CARDIAC ELECTROPHYSIOLOGY PROCEDURE N/A 2019    Procedure: Device Implant;  Surgeon: Dom Gallardo MD;  Location: [facility] INVASIVE LOCATION;  Service: Cardiology   • HERNIA REPAIR     • KNEE SURGERY           Social History     Socioeconomic History   • Marital status:    • Number of children: 2   Tobacco Use   • Smoking status: Former     Packs/day: 1.50     Years: 30.00     Pack years: 45.00     Types: Cigarettes     Quit date: 3/5/1998     Years since quittin.1   • Smokeless tobacco: Never   Vaping Use   • Vaping  "Use: Never used   Substance and Sexual Activity   • Alcohol use: No   • Drug use: No   • Sexual activity: Defer         Family History   Problem Relation Age of Onset   • No Known Problems Mother    • No Known Problems Father    • Bone cancer Sister    • Lung cancer Brother    • Colon cancer Neg Hx        Objective  Physical Exam:  /83   Pulse 73   Temp 98.5 °F (36.9 °C) (Oral)   Resp 18   Ht 180.3 cm (71\")   Wt 72.6 kg (160 lb)   SpO2 97%   BMI 22.32 kg/m²      Physical Exam  Vitals and nursing note reviewed.   Constitutional:       Appearance: Normal appearance.   HENT:      Head: Normocephalic.      Nose: Nose normal.      Mouth/Throat:      Mouth: Mucous membranes are moist.   Cardiovascular:      Rate and Rhythm: Normal rate and regular rhythm.   Pulmonary:      Effort: Pulmonary effort is normal.   Neurological:      Mental Status: He is alert.           Procedures    ED Course:    ED Course as of 04/11/23 1611 Tue Apr 11, 2023   1539 EKG interpreted by me: Atrial paced rhythm, normal rate, no obvious acute ST/T changes, this is an abnormal EKG [MP]      ED Course User Index  [MP] Moreno Harrison MD       Lab Results (last 24 hours)     Procedure Component Value Units Date/Time    CBC & Differential [[id number]]  (Abnormal) Collected: 04/11/23 1530    Specimen: Blood Updated: 04/11/23 1553    Narrative:      The following orders were created for panel order CBC & Differential.  Procedure                               Abnormality         Status                     ---------                               -----------         ------                     CBC Auto Differential[[id number]]        Abnormal            Edited Result - FINAL        Please view results for these tests on the individual orders.    Comprehensive Metabolic Panel [[id number]]  (Abnormal) Collected: 04/11/23 1530    Specimen: Blood Updated: 04/11/23 1559     Glucose 93 mg/dL      BUN 40 mg/dL      Creatinine 2.13 mg/dL      " Sodium 137 mmol/L      Potassium 4.7 mmol/L      Chloride 107 mmol/L      CO2 21.0 mmol/L      Calcium 9.2 mg/dL      Total Protein 7.5 g/dL      Albumin 4.1 g/dL      ALT (SGPT) 12 U/L      AST (SGOT) 17 U/L      Alkaline Phosphatase 99 U/L      Total Bilirubin 0.4 mg/dL      Globulin 3.4 gm/dL      A/G Ratio 1.2 g/dL      BUN/Creatinine Ratio 18.8     Anion Gap 9.0 mmol/L      eGFR 28.9 mL/min/1.73     Narrative:      GFR Normal >60  Chronic Kidney Disease <60  Kidney Failure <15    The GFR formula is only valid for adults with stable renal function between ages 18 and 70.    CBC Auto Differential [[id number]]  (Abnormal) Collected: 04/11/23 1530    Specimen: Blood Updated: 04/11/23 1553     WBC 7.82 10*3/mm3      RBC 3.10 10*6/mm3      Hemoglobin 8.2 g/dL      Hematocrit 26.9 %      MCV 86.8 fL      MCH 26.5 pg      MCHC 30.5 g/dL      RDW 15.5 %      RDW-SD 48.9 fl      MPV 10.1 fL      Platelets 149 10*3/mm3      Neutrophil % 49.7 %      Lymphocyte % 14.6 %      Monocyte % 7.7 %      Eosinophil % 26.9 %      Basophil % 0.8 %      Neutrophils, Absolute 3.90 10*3/mm3      Lymphocytes, Absolute 1.14 10*3/mm3      Monocytes, Absolute 0.60 10*3/mm3      Eosinophils, Absolute 2.10 10*3/mm3      Basophils, Absolute 0.06 10*3/mm3     Narrative:      The previously reported component Immature Gran % is no longer being reported. Previous result was 0.3 % (Reference Range: 0.0-0.5 %) on 4/11/2023 at 1550 EDT.  The previously reported component NRBC is no longer being reported. Previous result was 0.0 /100 WBC (Reference Range: 0.0-0.2 /100 WBC) on 4/11/2023 at 1550 EDT.  The previously reported component Absolute Immature Gran is no longer being reported. Previous result was 0.02 10*3/mm3 (Reference Range: 0.00-0.05 10*3/mm3) on 4/11/2023 at 1550 EDT.    Manual Differential [[id number]]  (Abnormal) Collected: 04/11/23 1530    Specimen: Blood Updated: 04/11/23 1604     Neutrophil % 51.0 %      Lymphocyte % 16.3 %       Monocyte % 8.2 %      Eosinophil % 24.5 %      Neutrophils Absolute 3.99 10*3/mm3      Lymphocytes Absolute 1.27 10*3/mm3      Monocytes Absolute 0.64 10*3/mm3      Eosinophils Absolute 1.92 10*3/mm3      RBC Morphology Normal     WBC Morphology Normal     Platelet Estimate Adequate           No radiology results from the last 24 hrs       Medical Decision Making  89-year-old gentleman presented to the emergency department for evaluation for possible low hemoglobin, he has history of chronic kidney disease and chronic anemia, he is asymptomatic.  Differential would include chronic anemia, blood loss anemia, chronic kidney disease evaluate the patient the bedside, and reviewed and summarized all previous medical records including previous lab work.  On physical exam the patient had no pertinent findings and no complaints.  Repeated lab work, evaluated interpreted myself and found his hemoglobin to be improved from a week ago, not warranting any blood in the emergency department, he remains asymptomatic, and can be safely discharged home, discussed this with the patient's family and he will be safely discharged.    Chronic anemia: complicated acute illness or injury  Encounter for medical screening examination: complicated acute illness or injury  Stage 3a chronic kidney disease: complicated acute illness or injury  Amount and/or Complexity of Data Reviewed  Labs: ordered.  ECG/medicine tests: ordered.      Risk  Prescription drug management.            Final diagnoses:   Encounter for medical screening examination   Chronic anemia   Stage 3a chronic kidney disease        Manoj Garduno Jr., PA-C  04/11/23 3727

## 2023-04-12 PROCEDURE — 83540 ASSAY OF IRON: CPT | Performed by: PHYSICIAN ASSISTANT

## 2023-04-12 PROCEDURE — 84466 ASSAY OF TRANSFERRIN: CPT | Performed by: PHYSICIAN ASSISTANT

## 2023-04-13 ENCOUNTER — LAB REQUISITION (OUTPATIENT)
Dept: LAB | Facility: HOSPITAL | Age: 88
End: 2023-04-13
Payer: MEDICARE

## 2023-04-13 DIAGNOSIS — D63.1 ANEMIA IN CHRONIC KIDNEY DISEASE (CODE): ICD-10-CM

## 2023-04-13 LAB
IRON 24H UR-MRATE: 22 MCG/DL (ref 59–158)
IRON SATN MFR SERPL: 5 % (ref 20–50)
TIBC SERPL-MCNC: 460 MCG/DL (ref 298–536)
TRANSFERRIN SERPL-MCNC: 309 MG/DL (ref 200–360)

## 2023-08-28 ENCOUNTER — TELEPHONE (OUTPATIENT)
Dept: CARDIOLOGY | Facility: CLINIC | Age: 88
End: 2023-08-28

## 2023-08-28 ENCOUNTER — OFFICE VISIT (OUTPATIENT)
Dept: CARDIOLOGY | Facility: CLINIC | Age: 88
End: 2023-08-28
Payer: MEDICARE

## 2023-08-28 VITALS
DIASTOLIC BLOOD PRESSURE: 78 MMHG | HEIGHT: 71 IN | WEIGHT: 157 LBS | OXYGEN SATURATION: 97 % | RESPIRATION RATE: 18 BRPM | SYSTOLIC BLOOD PRESSURE: 132 MMHG | BODY MASS INDEX: 21.98 KG/M2 | HEART RATE: 71 BPM

## 2023-08-28 DIAGNOSIS — R07.9 CHEST PAIN, UNSPECIFIED TYPE: Primary | ICD-10-CM

## 2023-08-28 DIAGNOSIS — N18.30 STAGE 3 CHRONIC KIDNEY DISEASE, UNSPECIFIED WHETHER STAGE 3A OR 3B CKD: ICD-10-CM

## 2023-08-28 DIAGNOSIS — I48.0 AF (PAROXYSMAL ATRIAL FIBRILLATION): ICD-10-CM

## 2023-08-28 DIAGNOSIS — I10 PRIMARY HYPERTENSION: ICD-10-CM

## 2023-08-28 DIAGNOSIS — I49.5 SSS (SICK SINUS SYNDROME): ICD-10-CM

## 2023-08-28 DIAGNOSIS — E78.00 PURE HYPERCHOLESTEROLEMIA: ICD-10-CM

## 2023-08-28 RX ORDER — IRBESARTAN 300 MG/1
300 TABLET ORAL NIGHTLY
COMMUNITY

## 2023-08-28 NOTE — TELEPHONE ENCOUNTER
Caller: ABHI ABARCA    Relationship: [employer]ISTER IN     Best call back number: [phone]    What is the best time to reach you: ANYTIME    Who are you requesting to speak with (clinical staff, provider,  specific staff member): CLINICAL        What was the call regarding: PATIENT WAS IN OFFICE TODAY FOR PACEMAKER CHECK . PATIENT WAS TOLD TO GO HOME SET IN FRONT OF MONITOR AND PUSH WHITE BUTTON.  PATIENT DID MACHINE LIGHT UP ALL THE WAY ACROSS.  NOT SURE THIS IS WHAT SUPPOSED TO HAPPEN. PLEASE REACH  OUT PATIENTS  IN LAW ABHI ABARCA TO DISCUSS    Is it okay if the provider responds through MyChart: NO

## 2023-08-28 NOTE — PROGRESS NOTES
[facility] Cardiology Office Follow Up Note    Jonathan Brown  [id number]  2023    Primary Care Provider: Sri Bangura MD    Chief Complaint: Routine follow-up    History of Present Illness:   Mr. Jonathan Brown is a 89y.o. male who presents to the Cardiology Clinic for routine follow-up.  The patient has a past medical history significant for hypertension, hyperlipidemia, and an abdominal aortic aneurysm status post endovascular repair.  He has a past cardiac history significant for paroxysmal atrial fibrillation and sick sinus syndrome, for which she has previously undergone placement of a [employer] dual-chamber pacemaker in 2019.  He returns the cardiology clinic today for routine follow-up.  Since his last appointment, the patient reports 1 episode of left upper extremity and chest pain which lasted for approximately 30 minutes before resolving spontaneously.  He did not seek medical attention at that time.  Since that episode, he has not had any significant episodes of recurrent chest pain or chest discomfort.  He denies any significant episodes of palpitations.  No significant exertional dyspnea.  He denies any significant palpitations.  He continues to tolerate Eliquis without significant bleeding or bruising.  No other specific complaints today.    Past Cardiac Testin. Last Coronary Angio: None  2. Prior Stress Testing: None  3. Last Echo:               1.  Hyperdynamic LV systolic function, LVEF 74%              2.  Mild to moderate concentric LVH              3.  Mild to moderate AI and TR  4. Prior Holter Monitor: Unknown    Review of Systems:   Review of Systems   Constitutional:  Negative for activity change, appetite change, chills, diaphoresis, fatigue, fever, unexpected weight gain and unexpected weight loss.   Eyes:  Negative for blurred vision and double vision.   Respiratory:  Negative for cough, chest tightness, shortness of breath and  wheezing.    Cardiovascular:  Positive for chest pain. Negative for palpitations and leg swelling.   Gastrointestinal:  Negative for abdominal pain, anal bleeding, blood in stool and GERD.   Endocrine: Negative for cold intolerance and heat intolerance.   Genitourinary:  Negative for hematuria.   Neurological:  Negative for dizziness, syncope, weakness and light-headedness.   Hematological:  Does not bruise/bleed easily.   Psychiatric/Behavioral:  Negative for depressed mood and stress. The patient is not nervous/anxious.      I have reviewed and/or updated the patient's past medical, past surgical, family, social history, problem list and allergies as appropriate.     Medications:     Current Outpatient Medications:     amLODIPine (NORVASC) 5 MG tablet, Take 1 tablet by mouth Daily., Disp: , Rfl:     apixaban (ELIQUIS) 2.5 MG tablet tablet, Take 1 tablet by mouth Every 12 (Twelve) Hours., Disp: , Rfl:     aspirin 81 MG EC tablet, Take 1 tablet by mouth Daily., Disp: , Rfl:     Cyanocobalamin (VITAMIN B-12 PO), Take  by mouth., Disp: , Rfl:     doxazosin (CARDURA) 4 MG tablet, Take 1 tablet by mouth Every Night., Disp: 90 tablet, Rfl: 3    ferrous sulfate 325 (65 FE) MG tablet, Take 1 tablet by mouth Daily With Breakfast., Disp: , Rfl:     finasteride (PROSCAR) 5 MG tablet, Take 1 tablet by mouth Daily., Disp: 90 tablet, Rfl: 3    irbesartan (AVAPRO) 300 MG tablet, Take 1 tablet by mouth Every Night., Disp: , Rfl:     levothyroxine (SYNTHROID, LEVOTHROID) 100 MCG tablet, levothyroxine 100 mcg tablet  TAKE 1 TABLET EVERY DAY, Disp: , Rfl:     montelukast (SINGULAIR) 10 MG tablet, montelukast 10 mg tablet, Disp: , Rfl:     Multiple Vitamins-Minerals (PRESERVISION AREDS 2 PO), Take  by mouth., Disp: , Rfl:     omeprazole (priLOSEC) 20 MG capsule, Take 1 capsule by mouth Daily., Disp: , Rfl:     ondansetron (ZOFRAN) 4 MG tablet, Take 1 tablet by mouth Every 8 (Eight) Hours As Needed for Nausea or Vomiting., Disp: 30  "tablet, Rfl: 1    Polyethylene Glycol powder, Take 1 cap full (17 grams) in 8 oz of noncarbonated beverage and drink once daily, Disp: 500 g, Rfl: 3    simvastatin (ZOCOR) 40 MG tablet, simvastatin 40 mg tablet, Disp: , Rfl:     tamsulosin (FLOMAX) 0.4 MG capsule 24 hr capsule, tamsulosin 0.4 mg capsule, Disp: , Rfl:     tiotropium (SPIRIVA) 18 MCG per inhalation capsule, Spiriva with HandiHaler 18 mcg and inhalation capsules, Disp: , Rfl:     irbesartan (AVAPRO) 150 MG tablet, irbesartan 150 mg tablet  TAKE 1 TABLET EVERY DAY, Disp: , Rfl:     Physical Exam:  Vital Signs:   Vitals:    08/28/23 0854   BP: 132/78   BP Location: Left arm   Patient Position: Sitting   Pulse: 71   Resp: 18   SpO2: 97%   Weight: 71.2 kg (157 lb)   Height: 180.3 cm (71\")       Physical Exam  Constitutional:       General: He is not in acute distress.     Appearance: Normal appearance. He is not diaphoretic.   HENT:      Head: Normocephalic and atraumatic.   Cardiovascular:      Rate and Rhythm: Normal rate and regular rhythm.      Heart sounds: No murmur heard.  Pulmonary:      Effort: Pulmonary effort is normal. No respiratory distress.      Breath sounds: Normal breath sounds. No stridor. No wheezing, rhonchi or rales.   Abdominal:      General: Bowel sounds are normal. There is no distension.      Palpations: Abdomen is soft.      Tenderness: There is no abdominal tenderness. There is no guarding or rebound.   Musculoskeletal:         General: No swelling. Normal range of motion.      Cervical back: Neck supple. No tenderness.   Skin:     General: Skin is warm and dry.   Neurological:      General: No focal deficit present.      Mental Status: He is alert and oriented to person, place, and time.   Psychiatric:         Mood and Affect: Mood normal.         Behavior: Behavior normal.       Results Review:   I reviewed the patient's new clinical results.  I personally viewed and interpreted the patient's EKG/Telemetry data      ECG 12 " Lead    Date/Time: 8/28/2023 9:38 AM  Performed by: Laith Haro MD  Authorized by: Laith Haro MD   Comparison: not compared with previous ECG   Previous ECG: no previous ECG available  Rhythm comments: Atrial paced  Rate: normal  QRS axis: normal    Clinical impression: abnormal EKG        Assessment / Plan:     1. SSS (sick sinus syndrome)   -- Has dual-chamber pacemaker in place  -- Interrogation today shows adequate functioning, 5 years to RENETTA  --Repeat interrogation in 1 year     2. AF (paroxysmal atrial fibrillation)   -- No significant episodes of A. fib on review of device interrogation  -- No recent episodes of palpitations  --Continue Eliquis for CVA prophylaxis     3. Primary hypertension  -- BP adequately controlled today     4. Hyperlipidemia  -- Lipid profile in 4/23 showed LDL 45, HDL 39, triglycerides 60  -- Continue statin     5.  Stage III chronic kidney disease  --Creatinine 2.13 in 4/23    6.  Chest pain  -- Episode of chest pain, underlying etiology currently unclear  -- No acute or prior ischemic changes on ECG today  -- Repeat echocardiogram to reassess LVEF and regional wall motion  -- Discussed pharmacologic stress test, however the patient has declined for now    Follow Up:   Return in about 6 months (around 2/28/2024).      Thank you for allowing me to participate in the care of your patient. Please to not hesitate to contact me with additional questions or concerns.     LAMAR Haro MD  Interventional Cardiology   08/28/2023  08:55 EDT

## 2023-08-28 NOTE — TELEPHONE ENCOUNTER
Patient is not currently enrolled in our clinic. After some research I found he is enrolled in [facility] which is 's office.  I have requested a transfer. I reached out to  and explained to her what I was doing. I made her aware that once I picked him up I would call her and we would work on getting the monitor up and transmitting. She verbalized understanding.     Called and left message for Bettye at 's office to release patient from their [facility].

## 2023-09-13 NOTE — TELEPHONE ENCOUNTER
Called to request patient release again.  Release successful, and patient enrolled in Dr Haro's [facility] clinic.  Any transmission moving forward will be monitored by this clinic.

## 2023-12-26 ENCOUNTER — APPOINTMENT (OUTPATIENT)
Dept: CT IMAGING | Facility: HOSPITAL | Age: 88
DRG: 189 | End: 2023-12-26
Payer: MEDICARE

## 2023-12-26 ENCOUNTER — APPOINTMENT (OUTPATIENT)
Dept: GENERAL RADIOLOGY | Facility: HOSPITAL | Age: 88
DRG: 189 | End: 2023-12-26
Payer: MEDICARE

## 2023-12-26 ENCOUNTER — HOSPITAL ENCOUNTER (INPATIENT)
Facility: HOSPITAL | Age: 88
LOS: 3 days | Discharge: HOSPICE | DRG: 189 | End: 2023-12-29
Attending: STUDENT IN AN ORGANIZED HEALTH CARE EDUCATION/TRAINING PROGRAM | Admitting: FAMILY MEDICINE
Payer: MEDICARE

## 2023-12-26 DIAGNOSIS — S72.001A CLOSED FRACTURE OF NECK OF RIGHT FEMUR, INITIAL ENCOUNTER: ICD-10-CM

## 2023-12-26 DIAGNOSIS — J44.1 COPD EXACERBATION: Primary | ICD-10-CM

## 2023-12-26 PROBLEM — J96.01 ACUTE HYPOXIC RESPIRATORY FAILURE: Status: ACTIVE | Noted: 2023-12-26

## 2023-12-26 LAB
A-A DO2: 41.3 MMHG
ALBUMIN SERPL-MCNC: 4.4 G/DL (ref 3.5–5.2)
ALBUMIN/GLOB SERPL: 1.2 G/DL
ALP SERPL-CCNC: 103 U/L (ref 39–117)
ALT SERPL W P-5'-P-CCNC: 11 U/L (ref 1–41)
ANION GAP SERPL CALCULATED.3IONS-SCNC: 13.2 MMOL/L (ref 5–15)
ARTERIAL PATENCY WRIST A: ABNORMAL
AST SERPL-CCNC: 19 U/L (ref 1–40)
ATMOSPHERIC PRESS: 732 MMHG
BACTERIA UR QL AUTO: ABNORMAL /HPF
BASE EXCESS BLDA CALC-SCNC: -0.2 MMOL/L (ref 0–2)
BASOPHILS # BLD AUTO: 0.04 10*3/MM3 (ref 0–0.2)
BASOPHILS NFR BLD AUTO: 0.4 % (ref 0–1.5)
BDY SITE: ABNORMAL
BILIRUB SERPL-MCNC: 0.7 MG/DL (ref 0–1.2)
BILIRUB UR QL STRIP: NEGATIVE
BUN SERPL-MCNC: 44 MG/DL (ref 8–23)
BUN/CREAT SERPL: 16.4 (ref 7–25)
CALCIUM SPEC-SCNC: 9.5 MG/DL (ref 8.2–9.6)
CHLORIDE SERPL-SCNC: 101 MMOL/L (ref 98–107)
CLARITY UR: ABNORMAL
CO2 SERPL-SCNC: 24.8 MMOL/L (ref 22–29)
COHGB MFR BLD: 1.7 % (ref 0–2)
COLOR UR: YELLOW
CREAT SERPL-MCNC: 2.68 MG/DL (ref 0.76–1.27)
CRP SERPL-MCNC: <0.3 MG/DL (ref 0–0.5)
DEPRECATED RDW RBC AUTO: 48.6 FL (ref 37–54)
EGFRCR SERPLBLD CKD-EPI 2021: 21.9 ML/MIN/1.73
EOSINOPHIL # BLD AUTO: 2.1 10*3/MM3 (ref 0–0.4)
EOSINOPHIL NFR BLD AUTO: 20 % (ref 0.3–6.2)
ERYTHROCYTE [DISTWIDTH] IN BLOOD BY AUTOMATED COUNT: 14.4 % (ref 12.3–15.4)
FLUAV SUBTYP SPEC NAA+PROBE: NOT DETECTED
FLUBV RNA ISLT QL NAA+PROBE: NOT DETECTED
GAS FLOW AIRWAY: 4 LPM
GEN 5 2HR TROPONIN T REFLEX: 77 NG/L
GLOBULIN UR ELPH-MCNC: 3.7 GM/DL
GLUCOSE SERPL-MCNC: 127 MG/DL (ref 65–99)
GLUCOSE UR STRIP-MCNC: NEGATIVE MG/DL
HCO3 BLDA-SCNC: 24.4 MMOL/L (ref 22–28)
HCT VFR BLD AUTO: 27.9 % (ref 37.5–51)
HCT VFR BLD CALC: 24.3 %
HGB BLD-MCNC: 8.7 G/DL (ref 13–17.7)
HGB UR QL STRIP.AUTO: NEGATIVE
HOLD SPECIMEN: NORMAL
HOLD SPECIMEN: NORMAL
HYALINE CASTS UR QL AUTO: ABNORMAL /LPF
IMM GRANULOCYTES # BLD AUTO: 0.05 10*3/MM3 (ref 0–0.05)
IMM GRANULOCYTES NFR BLD AUTO: 0.5 % (ref 0–0.5)
KETONES UR QL STRIP: NEGATIVE
LEUKOCYTE ESTERASE UR QL STRIP.AUTO: ABNORMAL
LYMPHOCYTES # BLD AUTO: 1.03 10*3/MM3 (ref 0.7–3.1)
LYMPHOCYTES NFR BLD AUTO: 9.8 % (ref 19.6–45.3)
Lab: ABNORMAL
MAGNESIUM SERPL-MCNC: 2.4 MG/DL (ref 1.6–2.4)
MCH RBC QN AUTO: 28.6 PG (ref 26.6–33)
MCHC RBC AUTO-ENTMCNC: 31.2 G/DL (ref 31.5–35.7)
MCV RBC AUTO: 91.8 FL (ref 79–97)
METHGB BLD QL: 0.5 % (ref 0–1.5)
MODALITY: ABNORMAL
MONOCYTES # BLD AUTO: 0.61 10*3/MM3 (ref 0.1–0.9)
MONOCYTES NFR BLD AUTO: 5.8 % (ref 5–12)
NEUTROPHILS NFR BLD AUTO: 6.66 10*3/MM3 (ref 1.7–7)
NEUTROPHILS NFR BLD AUTO: 63.5 % (ref 42.7–76)
NITRITE UR QL STRIP: NEGATIVE
NRBC BLD AUTO-RTO: 0 /100 WBC (ref 0–0.2)
NT-PROBNP SERPL-MCNC: 1662 PG/ML (ref 0–1800)
OXYHGB MFR BLDV: 89.2 % (ref 94–99)
PCO2 BLDA: 38.7 MM HG (ref 35–45)
PCO2 TEMP ADJ BLD: ABNORMAL MM[HG]
PH BLDA: 7.41 PH UNITS (ref 7.35–7.45)
PH UR STRIP.AUTO: 6 [PH] (ref 5–8)
PH, TEMP CORRECTED: ABNORMAL
PLATELET # BLD AUTO: 128 10*3/MM3 (ref 140–450)
PMV BLD AUTO: 9.6 FL (ref 6–12)
PO2 BLDA: 58.9 MM HG (ref 75–100)
PO2 TEMP ADJ BLD: ABNORMAL MM[HG]
POTASSIUM SERPL-SCNC: 5 MMOL/L (ref 3.5–5.2)
PROCALCITONIN SERPL-MCNC: 0.07 NG/ML (ref 0–0.25)
PROT SERPL-MCNC: 8.1 G/DL (ref 6–8.5)
PROT UR QL STRIP: NEGATIVE
RBC # BLD AUTO: 3.04 10*6/MM3 (ref 4.14–5.8)
RBC # UR STRIP: ABNORMAL /HPF
REF LAB TEST METHOD: ABNORMAL
RENAL EPI CELLS #/AREA URNS HPF: ABNORMAL /HPF
SAO2 % BLDCOA: 91.2 % (ref 94–100)
SARS-COV-2 RNA RESP QL NAA+PROBE: NOT DETECTED
SODIUM SERPL-SCNC: 139 MMOL/L (ref 136–145)
SP GR UR STRIP: 1.01 (ref 1–1.03)
SQUAMOUS #/AREA URNS HPF: ABNORMAL /HPF
TROPONIN T DELTA: -3 NG/L
TROPONIN T SERPL HS-MCNC: 80 NG/L
UROBILINOGEN UR QL STRIP: ABNORMAL
VENTILATOR MODE: ABNORMAL
WBC # UR STRIP: ABNORMAL /HPF
WBC NRBC COR # BLD AUTO: 10.49 10*3/MM3 (ref 3.4–10.8)
WHOLE BLOOD HOLD COAG: NORMAL
WHOLE BLOOD HOLD SPECIMEN: NORMAL

## 2023-12-26 PROCEDURE — 36600 WITHDRAWAL OF ARTERIAL BLOOD: CPT

## 2023-12-26 PROCEDURE — 25010000002 MORPHINE PER 10 MG: Performed by: STUDENT IN AN ORGANIZED HEALTH CARE EDUCATION/TRAINING PROGRAM

## 2023-12-26 PROCEDURE — 25010000002 METHYLPREDNISOLONE PER 125 MG: Performed by: STUDENT IN AN ORGANIZED HEALTH CARE EDUCATION/TRAINING PROGRAM

## 2023-12-26 PROCEDURE — 87086 URINE CULTURE/COLONY COUNT: CPT | Performed by: FAMILY MEDICINE

## 2023-12-26 PROCEDURE — 84145 PROCALCITONIN (PCT): CPT | Performed by: STUDENT IN AN ORGANIZED HEALTH CARE EDUCATION/TRAINING PROGRAM

## 2023-12-26 PROCEDURE — 94799 UNLISTED PULMONARY SVC/PX: CPT

## 2023-12-26 PROCEDURE — 25810000003 SODIUM CHLORIDE 0.9 % SOLUTION: Performed by: FAMILY MEDICINE

## 2023-12-26 PROCEDURE — 86140 C-REACTIVE PROTEIN: CPT | Performed by: STUDENT IN AN ORGANIZED HEALTH CARE EDUCATION/TRAINING PROGRAM

## 2023-12-26 PROCEDURE — 85025 COMPLETE CBC W/AUTO DIFF WBC: CPT | Performed by: STUDENT IN AN ORGANIZED HEALTH CARE EDUCATION/TRAINING PROGRAM

## 2023-12-26 PROCEDURE — 72125 CT NECK SPINE W/O DYE: CPT

## 2023-12-26 PROCEDURE — 94640 AIRWAY INHALATION TREATMENT: CPT

## 2023-12-26 PROCEDURE — 73562 X-RAY EXAM OF KNEE 3: CPT

## 2023-12-26 PROCEDURE — 93005 ELECTROCARDIOGRAM TRACING: CPT | Performed by: STUDENT IN AN ORGANIZED HEALTH CARE EDUCATION/TRAINING PROGRAM

## 2023-12-26 PROCEDURE — 82805 BLOOD GASES W/O2 SATURATION: CPT

## 2023-12-26 PROCEDURE — 82375 ASSAY CARBOXYHB QUANT: CPT

## 2023-12-26 PROCEDURE — 84484 ASSAY OF TROPONIN QUANT: CPT | Performed by: STUDENT IN AN ORGANIZED HEALTH CARE EDUCATION/TRAINING PROGRAM

## 2023-12-26 PROCEDURE — 87636 SARSCOV2 & INF A&B AMP PRB: CPT | Performed by: STUDENT IN AN ORGANIZED HEALTH CARE EDUCATION/TRAINING PROGRAM

## 2023-12-26 PROCEDURE — 70450 CT HEAD/BRAIN W/O DYE: CPT

## 2023-12-26 PROCEDURE — 99223 1ST HOSP IP/OBS HIGH 75: CPT | Performed by: FAMILY MEDICINE

## 2023-12-26 PROCEDURE — 99291 CRITICAL CARE FIRST HOUR: CPT

## 2023-12-26 PROCEDURE — 81001 URINALYSIS AUTO W/SCOPE: CPT | Performed by: STUDENT IN AN ORGANIZED HEALTH CARE EDUCATION/TRAINING PROGRAM

## 2023-12-26 PROCEDURE — 25010000002 ONDANSETRON PER 1 MG: Performed by: STUDENT IN AN ORGANIZED HEALTH CARE EDUCATION/TRAINING PROGRAM

## 2023-12-26 PROCEDURE — 80053 COMPREHEN METABOLIC PANEL: CPT | Performed by: STUDENT IN AN ORGANIZED HEALTH CARE EDUCATION/TRAINING PROGRAM

## 2023-12-26 PROCEDURE — 73522 X-RAY EXAM HIPS BI 3-4 VIEWS: CPT

## 2023-12-26 PROCEDURE — 83050 HGB METHEMOGLOBIN QUAN: CPT

## 2023-12-26 PROCEDURE — 71045 X-RAY EXAM CHEST 1 VIEW: CPT

## 2023-12-26 PROCEDURE — 83880 ASSAY OF NATRIURETIC PEPTIDE: CPT | Performed by: STUDENT IN AN ORGANIZED HEALTH CARE EDUCATION/TRAINING PROGRAM

## 2023-12-26 PROCEDURE — 83735 ASSAY OF MAGNESIUM: CPT | Performed by: STUDENT IN AN ORGANIZED HEALTH CARE EDUCATION/TRAINING PROGRAM

## 2023-12-26 PROCEDURE — 25010000002 MAGNESIUM SULFATE 2 GM/50ML SOLUTION: Performed by: STUDENT IN AN ORGANIZED HEALTH CARE EDUCATION/TRAINING PROGRAM

## 2023-12-26 RX ORDER — POLYETHYLENE GLYCOL 3350 17 G/17G
17 POWDER, FOR SOLUTION ORAL DAILY PRN
Status: DISCONTINUED | OUTPATIENT
Start: 2023-12-26 | End: 2023-12-29 | Stop reason: HOSPADM

## 2023-12-26 RX ORDER — IPRATROPIUM BROMIDE AND ALBUTEROL SULFATE 2.5; .5 MG/3ML; MG/3ML
3 SOLUTION RESPIRATORY (INHALATION) ONCE
Status: COMPLETED | OUTPATIENT
Start: 2023-12-26 | End: 2023-12-26

## 2023-12-26 RX ORDER — SODIUM CHLORIDE 9 MG/ML
40 INJECTION, SOLUTION INTRAVENOUS AS NEEDED
Status: DISCONTINUED | OUTPATIENT
Start: 2023-12-26 | End: 2023-12-29 | Stop reason: HOSPADM

## 2023-12-26 RX ORDER — IPRATROPIUM BROMIDE AND ALBUTEROL SULFATE 2.5; .5 MG/3ML; MG/3ML
3 SOLUTION RESPIRATORY (INHALATION) EVERY 4 HOURS PRN
Status: DISCONTINUED | OUTPATIENT
Start: 2023-12-26 | End: 2023-12-29 | Stop reason: HOSPADM

## 2023-12-26 RX ORDER — METHYLPREDNISOLONE SODIUM SUCCINATE 125 MG/2ML
125 INJECTION, POWDER, LYOPHILIZED, FOR SOLUTION INTRAMUSCULAR; INTRAVENOUS ONCE
Status: COMPLETED | OUTPATIENT
Start: 2023-12-26 | End: 2023-12-26

## 2023-12-26 RX ORDER — SODIUM CHLORIDE 9 MG/ML
75 INJECTION, SOLUTION INTRAVENOUS CONTINUOUS
Status: DISCONTINUED | OUTPATIENT
Start: 2023-12-26 | End: 2023-12-27

## 2023-12-26 RX ORDER — NITROGLYCERIN 0.4 MG/1
0.4 TABLET SUBLINGUAL
Status: DISCONTINUED | OUTPATIENT
Start: 2023-12-26 | End: 2023-12-29 | Stop reason: HOSPADM

## 2023-12-26 RX ORDER — ACETAMINOPHEN 650 MG/1
650 SUPPOSITORY RECTAL EVERY 4 HOURS PRN
Status: DISCONTINUED | OUTPATIENT
Start: 2023-12-26 | End: 2023-12-29 | Stop reason: HOSPADM

## 2023-12-26 RX ORDER — LOSARTAN POTASSIUM 50 MG/1
50 TABLET ORAL
Status: DISCONTINUED | OUTPATIENT
Start: 2023-12-27 | End: 2023-12-27

## 2023-12-26 RX ORDER — ACETAMINOPHEN 325 MG/1
650 TABLET ORAL EVERY 4 HOURS PRN
Status: DISCONTINUED | OUTPATIENT
Start: 2023-12-26 | End: 2023-12-29 | Stop reason: HOSPADM

## 2023-12-26 RX ORDER — ALUMINA, MAGNESIA, AND SIMETHICONE 2400; 2400; 240 MG/30ML; MG/30ML; MG/30ML
15 SUSPENSION ORAL EVERY 6 HOURS PRN
Status: DISCONTINUED | OUTPATIENT
Start: 2023-12-26 | End: 2023-12-29 | Stop reason: HOSPADM

## 2023-12-26 RX ORDER — ONDANSETRON 4 MG/1
4 TABLET, ORALLY DISINTEGRATING ORAL EVERY 6 HOURS PRN
Status: DISCONTINUED | OUTPATIENT
Start: 2023-12-26 | End: 2023-12-29 | Stop reason: HOSPADM

## 2023-12-26 RX ORDER — MONTELUKAST SODIUM 10 MG/1
10 TABLET ORAL NIGHTLY
Status: DISCONTINUED | OUTPATIENT
Start: 2023-12-26 | End: 2023-12-28

## 2023-12-26 RX ORDER — CHOLECALCIFEROL (VITAMIN D3) 125 MCG
5 CAPSULE ORAL NIGHTLY PRN
Status: DISCONTINUED | OUTPATIENT
Start: 2023-12-26 | End: 2023-12-29 | Stop reason: HOSPADM

## 2023-12-26 RX ORDER — SODIUM CHLORIDE 0.9 % (FLUSH) 0.9 %
10 SYRINGE (ML) INJECTION EVERY 12 HOURS SCHEDULED
Status: DISCONTINUED | OUTPATIENT
Start: 2023-12-26 | End: 2023-12-29 | Stop reason: HOSPADM

## 2023-12-26 RX ORDER — AMOXICILLIN 250 MG
2 CAPSULE ORAL 2 TIMES DAILY
Status: DISCONTINUED | OUTPATIENT
Start: 2023-12-26 | End: 2023-12-29 | Stop reason: HOSPADM

## 2023-12-26 RX ORDER — ACETAMINOPHEN 160 MG/5ML
650 SOLUTION ORAL EVERY 4 HOURS PRN
Status: DISCONTINUED | OUTPATIENT
Start: 2023-12-26 | End: 2023-12-29 | Stop reason: HOSPADM

## 2023-12-26 RX ORDER — MAGNESIUM SULFATE HEPTAHYDRATE 40 MG/ML
2 INJECTION, SOLUTION INTRAVENOUS ONCE
Status: COMPLETED | OUTPATIENT
Start: 2023-12-26 | End: 2023-12-26

## 2023-12-26 RX ORDER — SODIUM CHLORIDE 0.9 % (FLUSH) 0.9 %
10 SYRINGE (ML) INJECTION AS NEEDED
Status: DISCONTINUED | OUTPATIENT
Start: 2023-12-26 | End: 2023-12-29 | Stop reason: HOSPADM

## 2023-12-26 RX ORDER — MORPHINE SULFATE 2 MG/ML
2 INJECTION, SOLUTION INTRAMUSCULAR; INTRAVENOUS ONCE
Status: COMPLETED | OUTPATIENT
Start: 2023-12-26 | End: 2023-12-26

## 2023-12-26 RX ORDER — BUDESONIDE AND FORMOTEROL FUMARATE DIHYDRATE 160; 4.5 UG/1; UG/1
2 AEROSOL RESPIRATORY (INHALATION)
Status: DISCONTINUED | OUTPATIENT
Start: 2023-12-26 | End: 2023-12-29 | Stop reason: HOSPADM

## 2023-12-26 RX ORDER — HYDROCODONE BITARTRATE AND ACETAMINOPHEN 5; 325 MG/1; MG/1
1 TABLET ORAL EVERY 4 HOURS PRN
Status: DISCONTINUED | OUTPATIENT
Start: 2023-12-26 | End: 2023-12-29 | Stop reason: HOSPADM

## 2023-12-26 RX ORDER — BISACODYL 10 MG
10 SUPPOSITORY, RECTAL RECTAL DAILY PRN
Status: DISCONTINUED | OUTPATIENT
Start: 2023-12-26 | End: 2023-12-29 | Stop reason: HOSPADM

## 2023-12-26 RX ORDER — BISACODYL 5 MG/1
5 TABLET, DELAYED RELEASE ORAL DAILY PRN
Status: DISCONTINUED | OUTPATIENT
Start: 2023-12-26 | End: 2023-12-29 | Stop reason: HOSPADM

## 2023-12-26 RX ORDER — ONDANSETRON 2 MG/ML
4 INJECTION INTRAMUSCULAR; INTRAVENOUS EVERY 6 HOURS PRN
Status: DISCONTINUED | OUTPATIENT
Start: 2023-12-26 | End: 2023-12-29 | Stop reason: HOSPADM

## 2023-12-26 RX ORDER — LEVOTHYROXINE SODIUM 0.1 MG/1
100 TABLET ORAL
Status: DISCONTINUED | OUTPATIENT
Start: 2023-12-27 | End: 2023-12-29 | Stop reason: HOSPADM

## 2023-12-26 RX ORDER — PREDNISONE 20 MG/1
40 TABLET ORAL
Status: DISCONTINUED | OUTPATIENT
Start: 2023-12-27 | End: 2023-12-28

## 2023-12-26 RX ORDER — ONDANSETRON 2 MG/ML
4 INJECTION INTRAMUSCULAR; INTRAVENOUS ONCE
Status: COMPLETED | OUTPATIENT
Start: 2023-12-26 | End: 2023-12-26

## 2023-12-26 RX ADMIN — METHYLPREDNISOLONE SODIUM SUCCINATE 125 MG: 125 INJECTION, POWDER, FOR SOLUTION INTRAMUSCULAR; INTRAVENOUS at 14:35

## 2023-12-26 RX ADMIN — MORPHINE SULFATE 2 MG: 2 INJECTION, SOLUTION INTRAMUSCULAR; INTRAVENOUS at 13:04

## 2023-12-26 RX ADMIN — SODIUM CHLORIDE 75 ML/HR: 9 INJECTION, SOLUTION INTRAVENOUS at 22:18

## 2023-12-26 RX ADMIN — MORPHINE SULFATE 4 MG: 4 INJECTION, SOLUTION INTRAMUSCULAR; INTRAVENOUS at 14:38

## 2023-12-26 RX ADMIN — DOCUSATE SODIUM 50MG AND SENNOSIDES 8.6MG 2 TABLET: 8.6; 5 TABLET, FILM COATED ORAL at 22:15

## 2023-12-26 RX ADMIN — Medication 10 ML: at 22:16

## 2023-12-26 RX ADMIN — ONDANSETRON 4 MG: 2 INJECTION INTRAMUSCULAR; INTRAVENOUS at 13:03

## 2023-12-26 RX ADMIN — MAGNESIUM SULFATE HEPTAHYDRATE 2 G: 40 INJECTION, SOLUTION INTRAVENOUS at 14:36

## 2023-12-26 RX ADMIN — BUDESONIDE AND FORMOTEROL FUMARATE DIHYDRATE 2 PUFF: 160; 4.5 AEROSOL RESPIRATORY (INHALATION) at 21:12

## 2023-12-26 RX ADMIN — MONTELUKAST 10 MG: 10 TABLET, FILM COATED ORAL at 22:15

## 2023-12-26 RX ADMIN — IPRATROPIUM BROMIDE AND ALBUTEROL SULFATE 3 ML: .5; 3 SOLUTION RESPIRATORY (INHALATION) at 14:39

## 2023-12-26 NOTE — H&P
"  [facility]      Name:  Jonathan Brown   Age:  89 y.o.  Sex:  male  :  1933  MRN:  [id number]   Visit Number:  [id number]  Admission Date:  2023  Date Of Service:  23  Primary Care Physician:  Sri Bangura MD    Chief Complaint:     Fall, hip pain    History Of Presenting Illness:      The patient is a chronically ill 89-year-old with medical history of chronic kidney disease stage IV, hypertension, hyperlipidemia, COPD with chronic respiratory failure, hypothyroidism, sick sinus syndrome status post pacemaker, paroxysmal atrial fibrillation, impaired mobility and ADLs, hearing loss, who had presented from home after sustaining a fall.  History obtained from ER record, patient at bedside.  No family available.  He did note he had been \"sick\" for a few days, somewhat short of breath with a cough.  Notes that earlier this morning while ambulating he had felt lightheaded, subsequently fell and landed on his right hip and lower leg.  Unable to ambulate thereafter.  He notes he does have COPD, has not smoked in many years.  Does use inhalers at home.  Notes sister helps him some, is his POA, also has a daughter.    In the ER, he was normotensive, heart rate in the 80s, afebrile.  Saturating 95% on 4 L of oxygen.  White count 10 hemoglobin 8.7 platelets 128.  Procalcitonin 0.07 proBNP 1600 high-sensitivity troponin of 80.  Creatinine 2.68 potassium 5 negative flu COVID test.  ABG pH 7.409 pCO2 38 pO2 58.  Urinalysis with small leukocyte esterase and 2+ bacteria.  CT cervical spine without contrast unremarkable.  CT scan of the head without contrast unremarkable.  3 view right knee x-ray unremarkable.  Bilateral hip x-rays with evidence of a subcapital right femoral neck fracture with displacement noted.  Chest x-ray with chronic findings.  The patient was given pain control, bronchodilators, steroids.  Dr. Oliveros with orthopedics to see in " consult.  We were asked admit thereafter.    Review Of Systems:    All systems were reviewed and negative except as mentioned in history of presenting illness, assessment and plan.    Past Medical History: Patient  has a past medical history of Anemia, Arthritis, Asthma, Blood clotting disorder, COPD (chronic obstructive pulmonary disease), H/O emphysema, Hiatal hernia, History of blood transfusion, Hyperlipidemia, Hypertension, Kidney infection, Kidney stones, Positive TB test, and Thyroid disease.    Past Surgical History: Patient  has a past surgical history that includes Hernia repair; Knee surgery; AAA repair, open; and Cardiac electrophysiology procedure (N/A, 4/8/2019).    Social History: Patient  reports that he quit smoking about 25 years ago. His smoking use included cigarettes. He has a 45.00 pack-year smoking history. He has been exposed to tobacco smoke. He has never used smokeless tobacco. He reports that he does not drink alcohol and does not use drugs.    Family History:  Patient's family history has been reviewed and found to be noncontributory.     Allergies:      Atorvastatin    Home Medications:    Prior to Admission Medications       Prescriptions Last Dose Informant Patient Reported? Taking?    amLODIPine (NORVASC) 5 MG tablet   Yes No    Take 1 tablet by mouth Daily.    apixaban (ELIQUIS) 2.5 MG tablet tablet   Yes No    Take 1 tablet by mouth Every 12 (Twelve) Hours.    aspirin 81 MG EC tablet   Yes No    Take 1 tablet by mouth Daily.    Cyanocobalamin (VITAMIN B-12 PO)   Yes No    Take  by mouth.    doxazosin (CARDURA) 4 MG tablet   No No    Take 1 tablet by mouth Every Night.    ferrous sulfate 325 (65 FE) MG tablet   Yes No    Take 1 tablet by mouth Daily With Breakfast.    finasteride (PROSCAR) 5 MG tablet   No No    Take 1 tablet by mouth Daily.    irbesartan (AVAPRO) 150 MG tablet   Yes No    irbesartan 150 mg tablet   TAKE 1 TABLET EVERY DAY    irbesartan (AVAPRO) 300 MG tablet   Yes  "No    Take 1 tablet by mouth Every Night.    levothyroxine (SYNTHROID, LEVOTHROID) 100 MCG tablet   Yes No    levothyroxine 100 mcg tablet   TAKE 1 TABLET EVERY DAY    montelukast (SINGULAIR) 10 MG tablet   Yes No    montelukast 10 mg tablet    Multiple Vitamins-Minerals (PRESERVISION AREDS 2 PO)   Yes No    Take  by mouth.    omeprazole (priLOSEC) 20 MG capsule   Yes No    Take 1 capsule by mouth Daily.    ondansetron (ZOFRAN) 4 MG tablet   No No    Take 1 tablet by mouth Every 8 (Eight) Hours As Needed for Nausea or Vomiting.    Polyethylene Glycol powder   No No    Take 1 cap full (17 grams) in 8 oz of noncarbonated beverage and drink once daily    simvastatin (ZOCOR) 40 MG tablet   Yes No    simvastatin 40 mg tablet    tamsulosin (FLOMAX) 0.4 MG capsule 24 hr capsule   Yes No    tamsulosin 0.4 mg capsule    tiotropium (SPIRIVA) 18 MCG per inhalation capsule   Yes No    Spiriva with HandiHaler 18 mcg and inhalation capsules          ED Medications:    Medications   sodium chloride 0.9 % flush 10 mL (has no administration in time range)   Morphine sulfate (PF) injection 2 mg (2 mg Intravenous Given 12/26/23 1304)   ondansetron (ZOFRAN) injection 4 mg (4 mg Intravenous Given 12/26/23 1303)   methylPREDNISolone sodium succinate (SOLU-Medrol) injection 125 mg (125 mg Intravenous Given 12/26/23 1435)   ipratropium-albuterol (DUO-NEB) nebulizer solution 3 mL (3 mL Nebulization Given 12/26/23 1439)   magnesium sulfate 2g/50 mL (PREMIX) infusion (0 g Intravenous Stopped 12/26/23 1529)   morphine injection 4 mg (4 mg Intravenous Given 12/26/23 1438)     Vital Signs:  Temp:  [98.2 °F (36.8 °C)] 98.2 °F (36.8 °C)  Heart Rate:  [77-98] 89  Resp:  [18] 18  BP: ()/(55-83) 109/63        12/26/23  1213   Weight: 64.4 kg (142 lb)     Body mass index is 19.8 kg/m².    Physical Exam:     Most recent vital Signs: /63   Pulse 89   Temp 98.2 °F (36.8 °C) (Oral)   Resp 18   Ht 180.3 cm (71\")   Wt 64.4 kg (142 lb)   " SpO2 92%   BMI 19.80 kg/m²     Physical Exam  Constitutional:       Appearance: He is ill-appearing.   HENT:      Head: Normocephalic.      Ears:      Comments: Hard of hearing  Eyes:      Extraocular Movements: Extraocular movements intact.      Pupils: Pupils are equal, round, and reactive to light.   Cardiovascular:      Rate and Rhythm: Normal rate and regular rhythm.      Pulses: Normal pulses.      Heart sounds: No murmur heard.  Pulmonary:      Breath sounds: No stridor. Wheezing present. No rhonchi.   Abdominal:      General: Abdomen is flat. Bowel sounds are normal. There is no distension.      Tenderness: There is no abdominal tenderness. There is no guarding.   Musculoskeletal:         General: Deformity (Right leg externally rotated tender) present.      Right lower leg: No edema.      Left lower leg: No edema.   Skin:     General: Skin is warm.      Capillary Refill: Capillary refill takes less than 2 seconds.      Findings: No bruising or lesion.   Neurological:      General: No focal deficit present.      Mental Status: He is alert. Mental status is at baseline.      Motor: Weakness present.   Psychiatric:         Mood and Affect: Mood normal.         Thought Content: Thought content normal.         Laboratory data:    I have reviewed the labs done in the emergency room.    Results from last 7 days   Lab Units 12/26/23  1216   SODIUM mmol/L 139   POTASSIUM mmol/L 5.0   CHLORIDE mmol/L 101   CO2 mmol/L 24.8   BUN mg/dL 44*   CREATININE mg/dL 2.68*   CALCIUM mg/dL 9.5   BILIRUBIN mg/dL 0.7   ALK PHOS U/L 103   ALT (SGPT) U/L 11   AST (SGOT) U/L 19   GLUCOSE mg/dL 127*     Results from last 7 days   Lab Units 12/26/23  1216   WBC 10*3/mm3 10.49   HEMOGLOBIN g/dL 8.7*   HEMATOCRIT % 27.9*   PLATELETS 10*3/mm3 128*         Results from last 7 days   Lab Units 12/26/23  1404 12/26/23  1216   HSTROP T ng/L 77* 80*     Results from last 7 days   Lab Units 12/26/23  1216   PROBNP pg/mL 1,662.0              Results from last 7 days   Lab Units 12/26/23  1327   PH, ARTERIAL pH units 7.409   PO2 ART mm Hg 58.9*   PCO2, ARTERIAL mm Hg 38.7   HCO3 ART mmol/L 24.4     Results from last 7 days   Lab Units 12/26/23  1350   COLOR UA  Yellow   GLUCOSE UA  Negative   KETONES UA  Negative   BLOOD UA  Negative   LEUKOCYTES UA  Small (1+)*   PH, URINE  6.0   BILIRUBIN UA  Negative   UROBILINOGEN UA  0.2 E.U./dL   RBC UA /HPF None Seen   WBC UA /HPF 6-10*       Pain Management Panel  More data may exist         Latest Ref Rng & Units 10/19/2021 10/9/2015   Pain Management Panel   Creatinine, Urine mg/dL 74.5  86.9        EKG:      Atrial paced rhythm    Radiology:    CT Cervical Spine Without Contrast    Result Date: 12/26/2023  PROCEDURE: CT CERVICAL SPINE WO CONTRAST-  HISTORY: Neck trauma (Age >= 65y), neck pain  TECHNIQUE: Thin section axial CT with sagittal and coronal reconstructions  FINDINGS: No fracture is present. Alignment is normal.  No bony canal stenosis is seen. Mild diffuse degenerative disc disease is present. Mild facet arthropathy is noted.      Negative CT evaluation of the cervical spine for acute bony injury.    This study was performed with techniques to keep radiation doses as low as reasonably achievable (ALARA). Individualized dose reduction techniques using automated exposure control or adjustment of vA and/or kV according to the patient size were employed.  This report was signed and finalized on 12/26/2023 2:34 PM by Niels Sierra MD.      CT Head Without Contrast    Result Date: 12/26/2023  PROCEDURE: CT HEAD WO CONTRAST-  HISTORY: Head trauma, minor (Age >= 65y), pain  COMPARISON: 7/15/2022  TECHNIQUE: Noncontrast exam  FINDINGS: Advanced atrophy and chronic ischemic white matter changes are noted.  No cortical edema is present. There is no mass or hemorrhage. Ventricles are normal.  Bone windows show no skull fracture or obvious destructive lesion.      1. No acute intracranial abnormality or  obvious mass. 2. Atrophy and chronic ischemic white matter changes as above.   This study was performed with techniques to keep radiation doses as low as reasonably achievable (ALARA). Individualized dose reduction techniques using automated exposure control or adjustment of vA and/or kV according to the patient size were employed.    This report was signed and finalized on 12/26/2023 2:31 PM by Niels Sierra MD.      XR Hips Bilateral With or Without Pelvis 3-4 View    Result Date: 12/26/2023  PROCEDURE: XR HIPS BILATERAL W OR WO PELVIS 3-4 VIEW-  THREE VIEW  HISTORY: trauma  FINDINGS:  Three views show fracture of the subcapital right femoral neck.  There is moderate displacement. Left proximal femur is intact.  The joint spaces appear normal.       Subcapital right femoral neck fracture as above.    This report was signed and finalized on 12/26/2023 2:19 PM by Niels Sierra MD.      XR Knee 3 View Right    Result Date: 12/26/2023  PROCEDURE: XR KNEE 3 VW RIGHT-  THREE VIEW  HISTORY: trauma, pain  FINDINGS:  Three views show no evidence of an acute, displaced fracture or dislocation of the visualized bony architecture.  The joint spaces appear normal. Generalized osteopenia is present.      Unremarkable exam.     This report was signed and finalized on 12/26/2023 2:18 PM by Niels Sierra MD.      XR Chest 1 View    Result Date: 12/26/2023  Chest single view  COMPARISON: Chest from 4-8-2019  FINDINGS: Cardiac silhouette is of normal size. Left subclavian pacemaker is present.  Mild linear density is identified in the perihilar regions bilaterally, consistent with atelectasis. No localized airspace infiltrates are seen.      1. Chronic changes and mild perihilar atelectasis, as described. 2. No evidence of acute infiltrate.  This report was signed and finalized on 12/26/2023 1:33 PM by Erlin Duncan MD.       Assessment:    Right femoral neck fracture, POA  Acute on chronic hypoxic respiratory failure, POA   COPD  with exacerbation  History of sick sinus syndrome status post pacemaker  Atrial fibrillation on Eliquis  Hearing loss  Acute on chronic kidney disease stage IV    Plan:    Admit as inpatient    Hip fracture:  Dr. Oliveros with orthopedics to see in consultation.  After initial discussion, patient not too sure if he wants to proceed with surgery, discussed significant risk without surgery.  He is concerned about his underlying medical conditions and anesthesia.  He will need preop evaluation by cardiology and improvement in pulmonary status is much as possible.  Tentatively plan for surgery may be Thursday.  As needed pain control.    COPD:  Likely worsened due to fall, however had symptoms of exacerbation prior to.  Currently oxygen saturations are stable on 4 L.  Will continue with bronchodilators and prednisone.    Sick sinus syndrome:  Follows with Dr. Haro.  Has pacemaker, also with atrial fibrillation chronically on Eliquis.    Renal failure:  Creatinine appears to be worsening from most recent in system.  Will place Lemons catheter for any possible retention, impaired mobility currently as well.  History of BPH.  Will consult with nephrology.  Avoid nephrotoxic agents.    Patient otherwise meets inpatient level care with anticipated stay greater than 2 midnights.  Further recommendation be depend upon improvement of clinical condition.  Plan care discussed with the patient no family currently at bedside.  He did note that he is a DNI/DNR.    Risk Assessment: High  DVT Prophylaxis: SCDs  Code Status: DNR  Diet: As tolerated, has dentures    Advance Care Planning   ACP discussion was held with the patient during this visit. Patient has an advance directive in EMR which is still valid.            Leslie Packer, DO  12/26/23  17:09 EST    Dictated utilizing Dragon dictation.

## 2023-12-26 NOTE — ED PROVIDER NOTES
Subjective:  History of Present Illness:    Patient is a 89-year-old male history of COPD on home oxygen at night, hypertension, hyperlipidemia, nephrolithiasis, hypothyroidism presents today after a fall.  Reports that he has been increasingly short of breath over the last 4 days.  Dyspneic this morning.  Attempted to walk to his house on his own, became very lightheaded and fell onto his right hip.  Did hit his head, denies losing consciousness.  Denies chest pain.  Has pain to his right hip, right knee.  Denies any abdominal pain nausea vomiting or diarrhea.  States that he had cough and congestion prior to arrival.      Nurses Notes reviewed and agree, including vitals, allergies, social history and prior medical history.     REVIEW OF SYSTEMS: All systems reviewed and not pertinent unless noted.  Review of Systems   Constitutional:  Positive for activity change. Negative for appetite change, chills, fatigue and fever.   HENT:  Positive for congestion. Negative for sinus pressure, sneezing and trouble swallowing.    Eyes:  Negative for discharge and itching.   Respiratory:  Positive for cough and shortness of breath.    Cardiovascular:  Negative for chest pain and palpitations.   Gastrointestinal:  Negative for abdominal distention, abdominal pain, diarrhea, nausea and vomiting.   Endocrine: Negative for cold intolerance and heat intolerance.   Genitourinary:  Negative for decreased urine volume, dysuria and urgency.   Musculoskeletal:  Negative for gait problem, neck pain and neck stiffness.   Skin:  Negative for color change and rash.   Allergic/Immunologic: Negative for immunocompromised state.   Neurological:  Negative for facial asymmetry and headaches.   Hematological:  Negative for adenopathy.   Psychiatric/Behavioral:  Negative for self-injury and suicidal ideas.        Past Medical History:   Diagnosis Date    Anemia     Arthritis     Asthma     Blood clotting disorder     COPD (chronic obstructive  "pulmonary disease)     H/O emphysema     Hiatal hernia     History of blood transfusion     Hyperlipidemia     Hypertension     Kidney infection     Kidney stones     Positive TB test     Thyroid disease        Allergies:    Atorvastatin      Past Surgical History:   Procedure Laterality Date    ABDOMINAL AORTIC ANEURYSM REPAIR      CARDIAC ELECTROPHYSIOLOGY PROCEDURE N/A 2019    Procedure: Device Implant;  Surgeon: Dom Gallardo MD;  Location: [facility] INVASIVE LOCATION;  Service: Cardiology    HERNIA REPAIR      KNEE SURGERY           Social History     Socioeconomic History    Marital status:     Number of children: 2   Tobacco Use    Smoking status: Former     Packs/day: 1.50     Years: 30.00     Additional pack years: 0.00     Total pack years: 45.00     Types: Cigarettes     Quit date: 3/5/1998     Years since quittin.8     Passive exposure: Past    Smokeless tobacco: Never   Vaping Use    Vaping Use: Never used   Substance and Sexual Activity    Alcohol use: No    Drug use: No    Sexual activity: Defer         Family History   Problem Relation Age of Onset    No Known Problems Mother     No Known Problems Father     Bone cancer Sister     Lung cancer Brother     Colon cancer Neg Hx        Objective  Physical Exam:  /63   Pulse 89   Temp 98.6 °F (37 °C) (Temporal)   Resp 18   Ht 180.3 cm (71\")   Wt 68.6 kg (151 lb 3.8 oz)   SpO2 92%   BMI 21.09 kg/m²      Physical Exam  Constitutional:       General: He is not in acute distress.     Appearance: Normal appearance. He is normal weight. He is not ill-appearing.   HENT:      Head: Normocephalic and atraumatic.      Nose: Nose normal. Congestion and rhinorrhea present.      Mouth/Throat:      Mouth: Mucous membranes are moist.      Pharynx: Oropharynx is clear.   Eyes:      Extraocular Movements: Extraocular movements intact.      Conjunctiva/sclera: Conjunctivae normal.      Pupils: Pupils are equal, round, and reactive " to light.   Cardiovascular:      Rate and Rhythm: Normal rate and regular rhythm.      Pulses: Normal pulses.   Pulmonary:      Effort: Pulmonary effort is normal. No respiratory distress.      Breath sounds: Normal breath sounds.   Abdominal:      General: Abdomen is flat. Bowel sounds are normal. There is no distension.      Palpations: Abdomen is soft.      Tenderness: There is no abdominal tenderness. There is no guarding or rebound.   Musculoskeletal:         General: Tenderness and signs of injury present. No swelling or deformity. Normal range of motion.      Cervical back: Normal range of motion and neck supple. No rigidity or tenderness.      Comments: Patient with pain and flex position to right knee, right hip, neurovascular intact   Skin:     General: Skin is warm and dry.      Capillary Refill: Capillary refill takes less than 2 seconds.   Neurological:      General: No focal deficit present.      Mental Status: He is alert and oriented to person, place, and time. Mental status is at baseline.      Cranial Nerves: No cranial nerve deficit.      Sensory: No sensory deficit.      Motor: No weakness.   Psychiatric:         Mood and Affect: Mood normal.         Behavior: Behavior normal.         Thought Content: Thought content normal.         Judgment: Judgment normal.         Critical Care    Performed by: Lalit Patel MD  Authorized by: Leslie Packer DO    Critical care provider statement:     Critical care time (minutes):  30    Critical care time was exclusive of:  Separately billable procedures and treating other patients    Critical care was necessary to treat or prevent imminent or life-threatening deterioration of the following conditions:  Respiratory failure and trauma    Critical care was time spent personally by me on the following activities:  Blood draw for specimens, development of treatment plan with patient or surrogate, discussions with consultants, discussions with  primary provider, evaluation of patient's response to treatment, examination of patient, obtaining history from patient or surrogate, ordering and performing treatments and interventions, ordering and review of laboratory studies, ordering and review of radiographic studies, pulse oximetry, re-evaluation of patient's condition and review of old charts    Care discussed with: admitting provider        ED Course:    ED Course as of 12/26/23 1831   Tue Dec 26, 2023   1303 EKG interpreted by me, normal sinus rhythm with no concerning ST changes noted, intermittent PVCs noted on the lead, rate of 82 [JE]      ED Course User Index  [JE] Lalit Patel MD       Lab Results (last 24 hours)       Procedure Component Value Units Date/Time    CBC & Differential [[id number]]  (Abnormal) Collected: 12/26/23 1216    Specimen: Blood Updated: 12/26/23 1223    Narrative:      The following orders were created for panel order CBC & Differential.  Procedure                               Abnormality         Status                     ---------                               -----------         ------                     CBC Auto Differential[[id number]]        Abnormal            Final result                 Please view results for these tests on the individual orders.    Comprehensive Metabolic Panel [[id number]]  (Abnormal) Collected: 12/26/23 1216    Specimen: Blood Updated: 12/26/23 1240     Glucose 127 mg/dL      BUN 44 mg/dL      Creatinine 2.68 mg/dL      Sodium 139 mmol/L      Potassium 5.0 mmol/L      Chloride 101 mmol/L      CO2 24.8 mmol/L      Calcium 9.5 mg/dL      Total Protein 8.1 g/dL      Albumin 4.4 g/dL      ALT (SGPT) 11 U/L      AST (SGOT) 19 U/L      Alkaline Phosphatase 103 U/L      Total Bilirubin 0.7 mg/dL      Globulin 3.7 gm/dL      A/G Ratio 1.2 g/dL      BUN/Creatinine Ratio 16.4     Anion Gap 13.2 mmol/L      eGFR 21.9 mL/min/1.73     Narrative:      GFR Normal >60  Chronic Kidney Disease <60  Kidney  Failure <15    The GFR formula is only valid for adults with stable renal function between ages 18 and 70.    Single High Sensitivity Troponin T [[id number]]  (Abnormal) Collected: 12/26/23 1216    Specimen: Blood Updated: 12/26/23 1256     HS Troponin T 80 ng/L     Narrative:      High Sensitive Troponin T Reference Range:  <14.0 ng/L- Negative Female for AMI  <22.0 ng/L- Negative Male for AMI  >=14 - Abnormal Female indicating possible myocardial injury.  >=22 - Abnormal Male indicating possible myocardial injury.   Clinicians would have to utilize clinical acumen, EKG, Troponin, and serial changes to determine if it is an Acute Myocardial Infarction or myocardial injury due to an underlying chronic condition.         Magnesium [[id number]]  (Normal) Collected: 12/26/23 1216    Specimen: Blood Updated: 12/26/23 1240     Magnesium 2.4 mg/dL     CBC Auto Differential [[id number]]  (Abnormal) Collected: 12/26/23 1216    Specimen: Blood Updated: 12/26/23 1223     WBC 10.49 10*3/mm3      RBC 3.04 10*6/mm3      Hemoglobin 8.7 g/dL      Hematocrit 27.9 %      MCV 91.8 fL      MCH 28.6 pg      MCHC 31.2 g/dL      RDW 14.4 %      RDW-SD 48.6 fl      MPV 9.6 fL      Platelets 128 10*3/mm3      Neutrophil % 63.5 %      Lymphocyte % 9.8 %      Monocyte % 5.8 %      Eosinophil % 20.0 %      Basophil % 0.4 %      Immature Grans % 0.5 %      Neutrophils, Absolute 6.66 10*3/mm3      Lymphocytes, Absolute 1.03 10*3/mm3      Monocytes, Absolute 0.61 10*3/mm3      Eosinophils, Absolute 2.10 10*3/mm3      Basophils, Absolute 0.04 10*3/mm3      Immature Grans, Absolute 0.05 10*3/mm3      nRBC 0.0 /100 WBC     C-reactive Protein [[id number]]  (Normal) Collected: 12/26/23 1216    Specimen: Blood Updated: 12/26/23 1502     C-Reactive Protein <0.30 mg/dL     Procalcitonin [[id number]]  (Normal) Collected: 12/26/23 1216    Specimen: Blood Updated: 12/26/23 1521     Procalcitonin 0.07 ng/mL     Narrative:      As a Marker for Sepsis  "(Non-Neonates):    1. <0.5 ng/mL represents a low risk of severe sepsis and/or septic shock.  2. >2 ng/mL represents a high risk of severe sepsis and/or septic shock.    As a Marker for Lower Respiratory Tract Infections that require antibiotic therapy:    PCT on Admission    Antibiotic Therapy       6-12 Hrs later    >0.5                Strongly Recommended  >0.25 - <0.5        Recommended   0.1 - 0.25          Discouraged              Remeasure/reassess PCT  <0.1                Strongly Discouraged     Remeasure/reassess PCT    As 28 day mortality risk marker: \"Change in Procalcitonin Result\" (>80% or <=80%) if Day 0 (or Day 1) and Day 4 values are available. Refer to http://www.Biomeasurepct-calculator.com    Change in PCT <=80%  A decrease of PCT levels below or equal to 80% defines a positive change in PCT test result representing a higher risk for 28-day all-cause mortality of patients diagnosed with severe sepsis for septic shock.    Change in PCT >80%  A decrease of PCT levels of more than 80% defines a negative change in PCT result representing a lower risk for 28-day all-cause mortality of patients diagnosed with severe sepsis or septic shock.       BNP [[id number]]  (Normal) Collected: 12/26/23 1216    Specimen: Blood Updated: 12/26/23 1458     proBNP 1,662.0 pg/mL     Narrative:      This assay is used as an aid in the diagnosis of individuals suspected of having heart failure. It can be used as an aid in the diagnosis of acute decompensated heart failure (ADHF) in patients presenting with signs and symptoms of ADHF to the emergency department (ED). In addition, NT-proBNP of <300 pg/mL indicates ADHF is not likely.    Age Range Result Interpretation  NT-proBNP Concentration (pg/mL:      <50             Positive            >450                   Gray                 300-450                    Negative             <300    50-75           Positive            >900                  Gray                300-900     "              Negative            <300      >75             Positive            >1800                  Gray                300-1800                  Negative            <300    COVID-19 and FLU A/B PCR, 1 HR TAT - Swab, Nasopharynx [[id number]]  (Normal) Collected: 12/26/23 1217    Specimen: Swab from Nasopharynx Updated: 12/26/23 1254     COVID19 Not Detected     Influenza A PCR Not Detected     Influenza B PCR Not Detected    Narrative:      Fact sheet for providers: https://www.fda.gov/media/463049/download    Fact sheet for patients: https://www.fda.gov/media/287567/download    Test performed by PCR.    Blood Gas, Arterial With Co-Ox [[id number]]  (Abnormal) Collected: 12/26/23 1327    Specimen: Arterial Blood Updated: 12/26/23 1328     Site Right Radial     Domingo's Test N/A     pH, Arterial 7.409 pH units      pCO2, Arterial 38.7 mm Hg      pO2, Arterial 58.9 mm Hg      Comment: 84 Value below reference range        HCO3, Arterial 24.4 mmol/L      Base Excess, Arterial -0.2 mmol/L      Comment: 84 Value below reference range        O2 Saturation, Arterial 91.2 %      Comment: 84 Value below reference range        Hematocrit, Blood Gas 24.3 %      Comment: 84 Value below reference range        Oxyhemoglobin 89.2 %      Comment: 84 Value below reference range        Methemoglobin 0.50 %      Carboxyhemoglobin 1.7 %      A-a DO2 41.3 mmHg      Barometric Pressure for Blood Gas 732 mmHg      Modality Nasal Cannula     Flow Rate 4.0 lpm      Ventilator Mode NA     Collected by [id number]     Comment: Meter: [id number]     :  [id number]        pH, Temp Corrected --     pCO2, Temperature Corrected --     pO2, Temperature Corrected --    Urinalysis With Microscopic If Indicated (No Culture) - Urine, Clean Catch [[id number]]  (Abnormal) Collected: 12/26/23 1350    Specimen: Urine, Clean Catch Updated: 12/26/23 1404     Color, UA Yellow     Appearance, UA Cloudy     pH, UA 6.0     Specific [place], UA 1.012      Glucose, UA Negative     Ketones, UA Negative     Bilirubin, UA Negative     Blood, UA Negative     Protein, UA Negative     Leuk Esterase, UA Small (1+)     Nitrite, UA Negative     Urobilinogen, UA 0.2 E.U./dL    Urinalysis, Microscopic Only - Urine, Clean Catch [[id number]]  (Abnormal) Collected: 12/26/23 1350    Specimen: Urine, Clean Catch Updated: 12/26/23 1426     RBC, UA None Seen /HPF      WBC, UA 6-10 /HPF      Bacteria, UA 2+ /HPF      Squamous Epithelial Cells, UA 3-6 /HPF      Comment: Some small clumps        Renal Epithelial Cells, UA 0-2 /HPF      Hyaline Casts, UA None Seen /LPF      Methodology Manual Light Microscopy    High Sensitivity Troponin T 2Hr [[id number]]  (Abnormal) Collected: 12/26/23 1404    Specimen: Blood Updated: 12/26/23 1502     HS Troponin T 77 ng/L      Troponin T Delta -3 ng/L     Narrative:      High Sensitive Troponin T Reference Range:  <14.0 ng/L- Negative Female for AMI  <22.0 ng/L- Negative Male for AMI  >=14 - Abnormal Female indicating possible myocardial injury.  >=22 - Abnormal Male indicating possible myocardial injury.   Clinicians would have to utilize clinical acumen, EKG, Troponin, and serial changes to determine if it is an Acute Myocardial Infarction or myocardial injury due to an underlying chronic condition.                  CT Cervical Spine Without Contrast    Result Date: 12/26/2023  PROCEDURE: CT CERVICAL SPINE WO CONTRAST-  HISTORY: Neck trauma (Age >= 65y), neck pain  TECHNIQUE: Thin section axial CT with sagittal and coronal reconstructions  FINDINGS: No fracture is present. Alignment is normal.  No bony canal stenosis is seen. Mild diffuse degenerative disc disease is present. Mild facet arthropathy is noted.      Impression: Negative CT evaluation of the cervical spine for acute bony injury.    This study was performed with techniques to keep radiation doses as low as reasonably achievable (ALARA). Individualized dose reduction techniques using  automated exposure control or adjustment of vA and/or kV according to the patient size were employed.  This report was signed and finalized on 12/26/2023 2:34 PM by Niels Sierra MD.      CT Head Without Contrast    Result Date: 12/26/2023  PROCEDURE: CT HEAD WO CONTRAST-  HISTORY: Head trauma, minor (Age >= 65y), pain  COMPARISON: 7/15/2022  TECHNIQUE: Noncontrast exam  FINDINGS: Advanced atrophy and chronic ischemic white matter changes are noted.  No cortical edema is present. There is no mass or hemorrhage. Ventricles are normal.  Bone windows show no skull fracture or obvious destructive lesion.      Impression: 1. No acute intracranial abnormality or obvious mass. 2. Atrophy and chronic ischemic white matter changes as above.   This study was performed with techniques to keep radiation doses as low as reasonably achievable (ALARA). Individualized dose reduction techniques using automated exposure control or adjustment of vA and/or kV according to the patient size were employed.    This report was signed and finalized on 12/26/2023 2:31 PM by Niels Sierra MD.      XR Hips Bilateral With or Without Pelvis 3-4 View    Result Date: 12/26/2023  PROCEDURE: XR HIPS BILATERAL W OR WO PELVIS 3-4 VIEW-  THREE VIEW  HISTORY: trauma  FINDINGS:  Three views show fracture of the subcapital right femoral neck.  There is moderate displacement. Left proximal femur is intact.  The joint spaces appear normal.       Impression: Subcapital right femoral neck fracture as above.    This report was signed and finalized on 12/26/2023 2:19 PM by Niels Sierra MD.      XR Knee 3 View Right    Result Date: 12/26/2023  PROCEDURE: XR KNEE 3 VW RIGHT-  THREE VIEW  HISTORY: trauma, pain  FINDINGS:  Three views show no evidence of an acute, displaced fracture or dislocation of the visualized bony architecture.  The joint spaces appear normal. Generalized osteopenia is present.      Impression: Unremarkable exam.     This report was signed  and finalized on 12/26/2023 2:18 PM by Niels Sierra MD.      XR Chest 1 View    Result Date: 12/26/2023  Chest single view  COMPARISON: Chest from 4-8-2019  FINDINGS: Cardiac silhouette is of normal size. Left subclavian pacemaker is present.  Mild linear density is identified in the perihilar regions bilaterally, consistent with atelectasis. No localized airspace infiltrates are seen.      Impression: 1. Chronic changes and mild perihilar atelectasis, as described. 2. No evidence of acute infiltrate.  This report was signed and finalized on 12/26/2023 1:33 PM by Erlin Duncan MD.          MDM     Amount and/or Complexity of Data Reviewed  Decide to obtain previous medical records or to obtain history from someone other than the patient: yes        Initial impression of presenting illness: Shortness of breath, fall, hip pain    DDX: includes but is not limited to: Intracranial hemorrhage, C-spine fracture, hip fracture, tibial fracture, COPD exacerbation, bacterial pneumonia, PE    Patient arrives guarded with vitals interpreted by myself.     Pertinent features from physical exam: Patient requiring 6 L nasal cannula on exam.    Initial diagnostic plan: CBC, CMP, troponin, BNP, CRP, Pro-Chuck, ECG, chest x-ray, CT head, C-spine, plain film of right hip, plain film of right knee    Results from initial plan were reviewed and interpreted by me revealing no concerns for bacterial pneumonia, patient with increased work of breathing consistent with COPD exacerbation, patient with femoral neck fracture to the right, no other trauma noted    Diagnostic information from other sources: Discussed with family at bedside and reviewed past medical records    Interventions / Re-evaluation: Given morphine for pain control, given concerns for COPD exacerbation given Solu-Medrol, DuoNeb, magnesium with no improvement hypoxia    Results/clinical rationale were discussed with patient and family at bedside    Consultations/Discussion of  results with other physicians: Given my concern for hip fracture discussed with orthopedic surgery who will consult on the patient.  Discussed with hospitalist given concerns for hip fracture and COPD exacerbation and admitted to their service further management    Disposition plan: Admit  -----        Final diagnoses:   COPD exacerbation   Closed fracture of neck of right femur, initial encounter          Lalit Patel MD  12/26/23 [id number]

## 2023-12-27 ENCOUNTER — APPOINTMENT (OUTPATIENT)
Dept: GENERAL RADIOLOGY | Facility: HOSPITAL | Age: 88
DRG: 189 | End: 2023-12-27
Payer: MEDICARE

## 2023-12-27 PROBLEM — E43 SEVERE MALNUTRITION: Status: ACTIVE | Noted: 2023-12-27

## 2023-12-27 LAB
ANION GAP SERPL CALCULATED.3IONS-SCNC: 13.8 MMOL/L (ref 5–15)
BUN SERPL-MCNC: 48 MG/DL (ref 8–23)
BUN/CREAT SERPL: 17.3 (ref 7–25)
CALCIUM SPEC-SCNC: 9.2 MG/DL (ref 8.2–9.6)
CHLORIDE SERPL-SCNC: 102 MMOL/L (ref 98–107)
CO2 SERPL-SCNC: 22.2 MMOL/L (ref 22–29)
CREAT SERPL-MCNC: 2.77 MG/DL (ref 0.76–1.27)
DEPRECATED RDW RBC AUTO: 50 FL (ref 37–54)
EGFRCR SERPLBLD CKD-EPI 2021: 21.1 ML/MIN/1.73
ERYTHROCYTE [DISTWIDTH] IN BLOOD BY AUTOMATED COUNT: 14.5 % (ref 12.3–15.4)
GLUCOSE BLDC GLUCOMTR-MCNC: 232 MG/DL (ref 70–130)
GLUCOSE SERPL-MCNC: 162 MG/DL (ref 65–99)
HBA1C MFR BLD: 5.1 % (ref 4.8–5.6)
HCT VFR BLD AUTO: 26.3 % (ref 37.5–51)
HGB BLD-MCNC: 8.2 G/DL (ref 13–17.7)
MCH RBC QN AUTO: 29.2 PG (ref 26.6–33)
MCHC RBC AUTO-ENTMCNC: 31.2 G/DL (ref 31.5–35.7)
MCV RBC AUTO: 93.6 FL (ref 79–97)
PLATELET # BLD AUTO: 126 10*3/MM3 (ref 140–450)
PMV BLD AUTO: 10.9 FL (ref 6–12)
POTASSIUM SERPL-SCNC: 4.9 MMOL/L (ref 3.5–5.2)
RBC # BLD AUTO: 2.81 10*6/MM3 (ref 4.14–5.8)
SODIUM SERPL-SCNC: 138 MMOL/L (ref 136–145)
TROPONIN T SERPL HS-MCNC: 111 NG/L
WBC NRBC COR # BLD AUTO: 12.35 10*3/MM3 (ref 3.4–10.8)

## 2023-12-27 PROCEDURE — 99232 SBSQ HOSP IP/OBS MODERATE 35: CPT | Performed by: FAMILY MEDICINE

## 2023-12-27 PROCEDURE — 85027 COMPLETE CBC AUTOMATED: CPT | Performed by: FAMILY MEDICINE

## 2023-12-27 PROCEDURE — 82948 REAGENT STRIP/BLOOD GLUCOSE: CPT

## 2023-12-27 PROCEDURE — 94664 DEMO&/EVAL PT USE INHALER: CPT

## 2023-12-27 PROCEDURE — 80048 BASIC METABOLIC PNL TOTAL CA: CPT | Performed by: FAMILY MEDICINE

## 2023-12-27 PROCEDURE — 83036 HEMOGLOBIN GLYCOSYLATED A1C: CPT | Performed by: FAMILY MEDICINE

## 2023-12-27 PROCEDURE — 25010000002 FUROSEMIDE PER 20 MG: Performed by: STUDENT IN AN ORGANIZED HEALTH CARE EDUCATION/TRAINING PROGRAM

## 2023-12-27 PROCEDURE — 63710000001 PREDNISONE PER 1 MG: Performed by: FAMILY MEDICINE

## 2023-12-27 PROCEDURE — 94799 UNLISTED PULMONARY SVC/PX: CPT

## 2023-12-27 PROCEDURE — 71045 X-RAY EXAM CHEST 1 VIEW: CPT

## 2023-12-27 PROCEDURE — 84484 ASSAY OF TROPONIN QUANT: CPT | Performed by: STUDENT IN AN ORGANIZED HEALTH CARE EDUCATION/TRAINING PROGRAM

## 2023-12-27 PROCEDURE — 94761 N-INVAS EAR/PLS OXIMETRY MLT: CPT

## 2023-12-27 PROCEDURE — 25010000002 CEFTRIAXONE SODIUM-DEXTROSE 1-3.74 GM-%(50ML) RECONSTITUTED SOLUTION: Performed by: FAMILY MEDICINE

## 2023-12-27 RX ORDER — FUROSEMIDE 20 MG/1
20 TABLET ORAL EVERY OTHER DAY
COMMUNITY
End: 2023-12-29

## 2023-12-27 RX ORDER — MIDODRINE HYDROCHLORIDE 5 MG/1
2.5 TABLET ORAL
Status: DISCONTINUED | OUTPATIENT
Start: 2023-12-27 | End: 2023-12-27

## 2023-12-27 RX ORDER — MIDODRINE HYDROCHLORIDE 5 MG/1
5 TABLET ORAL
Status: DISCONTINUED | OUTPATIENT
Start: 2023-12-27 | End: 2023-12-27

## 2023-12-27 RX ORDER — FUROSEMIDE 10 MG/ML
40 INJECTION INTRAMUSCULAR; INTRAVENOUS ONCE
Status: COMPLETED | OUTPATIENT
Start: 2023-12-27 | End: 2023-12-27

## 2023-12-27 RX ORDER — CEFTRIAXONE 1 G/50ML
1000 INJECTION, SOLUTION INTRAVENOUS EVERY 24 HOURS
Status: DISCONTINUED | OUTPATIENT
Start: 2023-12-27 | End: 2023-12-28

## 2023-12-27 RX ORDER — CLINDAMYCIN PHOSPHATE 900 MG/50ML
900 INJECTION, SOLUTION INTRAVENOUS ONCE
Status: DISCONTINUED | OUTPATIENT
Start: 2023-12-27 | End: 2023-12-27

## 2023-12-27 RX ORDER — CEFAZOLIN SODIUM 2 G/50ML
2 SOLUTION INTRAVENOUS ONCE
Status: DISCONTINUED | OUTPATIENT
Start: 2023-12-27 | End: 2023-12-27

## 2023-12-27 RX ADMIN — HYDROCODONE BITARTRATE AND ACETAMINOPHEN 1 TABLET: 5; 325 TABLET ORAL at 18:13

## 2023-12-27 RX ADMIN — LEVOTHYROXINE SODIUM 100 MCG: 0.1 TABLET ORAL at 06:37

## 2023-12-27 RX ADMIN — BUDESONIDE AND FORMOTEROL FUMARATE DIHYDRATE 2 PUFF: 160; 4.5 AEROSOL RESPIRATORY (INHALATION) at 06:44

## 2023-12-27 RX ADMIN — DOCUSATE SODIUM 50MG AND SENNOSIDES 8.6MG 2 TABLET: 8.6; 5 TABLET, FILM COATED ORAL at 09:45

## 2023-12-27 RX ADMIN — HYDROCODONE BITARTRATE AND ACETAMINOPHEN 1 TABLET: 5; 325 TABLET ORAL at 09:45

## 2023-12-27 RX ADMIN — Medication 10 ML: at 09:46

## 2023-12-27 RX ADMIN — BUDESONIDE AND FORMOTEROL FUMARATE DIHYDRATE 2 PUFF: 160; 4.5 AEROSOL RESPIRATORY (INHALATION) at 18:52

## 2023-12-27 RX ADMIN — CEFTRIAXONE 1000 MG: 1 INJECTION, SOLUTION INTRAVENOUS at 09:57

## 2023-12-27 RX ADMIN — FUROSEMIDE 40 MG: 10 INJECTION, SOLUTION INTRAMUSCULAR; INTRAVENOUS at 22:52

## 2023-12-27 RX ADMIN — PREDNISONE 40 MG: 20 TABLET ORAL at 09:45

## 2023-12-27 NOTE — CASE MANAGEMENT/SOCIAL WORK
Discharge Planning Assessment  [facility]     Patient Name: Jonathan Brown  MRN: [id number]  Today's Date: 12/27/2023    Admit Date: 12/26/2023    Plan: Spoke with pt's POA/sister in law, Jenny Fishman  for dcp. Pt's primary is Dr. Patel and phamracy is [employer] or [employer] mail order. Pt has AD and POA. No financial concerns. Home DME is a walker, hearing aids, O2 provided by [employer], used at night. Dr. Yadav follows pt, Sarah in his office has helped apply for , [employer]. Pt is independent and lives alone. Jenny provides transportation, visits weekly. Pt will need str, prefer referrals be sent to [facility] and [facility].   Discharge Needs Assessment       Row Name 12/27/23 0938       Living Environment    People in Home alone    Current Living Arrangements home    Potentially Unsafe Housing Conditions none    In the past 12 months has the electric, gas, oil, or water company threatened to shut off services in your home? No    Primary Care Provided by self;friend    Quality of Family Relationships helpful;involved    Able to Return to Prior Arrangements no       Resource/Environmental Concerns    Resource/Environmental Concerns none    Transportation Concerns none       Food Insecurity    Within the past 12 months, you worried that your food would run out before you got the money to buy more. Never true    Within the past 12 months, the food you bought just didn't last and you didn't have money to get more. Never true       Transition Planning    Patient/Family Anticipates Transition to inpatient rehabilitation facility    Patient/Family Anticipated Services at Transition none    Transportation Anticipated --  ems       Discharge Needs Assessment    Readmission Within the Last 30 Days no previous admission in last 30 days    Equipment Currently Used at Home other (see comments)  hearing aids    Concerns to be Addressed no discharge needs identified    Anticipated Changes Related to  Illness inability to care for self    Equipment Needed After Discharge none    Discharge Facility/Level of Care Needs nursing facility, skilled    Provided Post Acute Provider List? Yes    Patient's Choice of Community Agency(s) prefers [facility] or [facility]                   Discharge Plan       Row Name 12/27/23 3140       Plan    Plan Spoke with pt's POA/sister in law, Jenny Fishman  for dcp. Pt's primary is Dr. Patel and phamracy is [employer] or [employer] mail order. Pt has AD and POA. No financial concerns. Home DME is a walker, hearing aids, O2 provided by [employer], used at night. Dr. Yadav follows pt, Sarah in his office has helped apply for , [employer]). Pt is independent and lives alone. Jenny provides transportation, visits weekly. Pt will need str, prefer referrals be sent to [facility] and [facility].                  Continued Care and Services - Admitted Since 12/26/2023    Coordination has not been started for this encounter.          Demographic Summary       Row Name 12/27/23 3836       General Information    Admission Type inpatient    Arrived From emergency department    Required Notices Provided Important Message from Medicare    Referral Source admission list    Reason for Consult discharge planning    Preferred Language English       Contact Information    Permission Granted to Share Info With family/designee    Contact Information Comments poa sister in law Jenny Fishman                    Functional Status       Row Name 12/27/23 0937       Functional Status    Usual Activity Tolerance moderate    Current Activity Tolerance poor       Functional Status, IADL    Medications assistive person    Meal Preparation independent    Housekeeping assistive person    Laundry assistive person    Shopping assistive equipment and person       Mental Status    General Appearance WDL WDL       Mental Status Summary    Recent Changes in Mental Status/Cognitive Functioning no  changes       Employment/    Employment Status retired                   Psychosocial    No documentation.                  Abuse/Neglect    No documentation.                  Legal    No documentation.                  Substance Abuse    No documentation.                  Patient Forms    No documentation.                     Antoinette Hawkins RN

## 2023-12-27 NOTE — PROGRESS NOTES
Pt seen by speech therapy this date - eval/report to follow. Recommend NPO until MBS to further assess oropharyngeal swallow. Discussed results with RN, MD.

## 2023-12-27 NOTE — DISCHARGE PLACEMENT REQUEST
"STR  Catia Brown (89 y.o. Male)       Date of Birth   02/13/1933    Social Security Number       Address   [address] [address]    Home Phone   [phone]    MRN   [id number]       [facility]   [facility]    Marital Status                               Admission Date   12/26/23    Admission Type   Emergency    Admitting Provider   Leslie Packer DO    Attending Provider   Leslie Packer DO    Department, Room/Bed   [facility] INTENSIVE CARE, I04/1       Discharge Date       Discharge Disposition       Discharge Destination                                 Attending Provider: Leslie Packer DO    Allergies: Atorvastatin    Isolation: None   Infection: None   Code Status: No CPR    Ht: 180.3 cm (71\")   Wt: 68.4 kg (150 lb 12.7 oz)    Admission Cmt: None   Principal Problem: Acute hypoxic respiratory failure [J96.01]                   Active Insurance as of 12/26/2023       Primary Coverage       Payor Plan Insurance Group Employer/Plan Group    HUMANA MEDICARE REPLACEMENT HUMANA MEDICARE REPLACEMENT [id number]       Payor Plan Address Payor Plan Phone Number Payor Plan Fax Number Effective Dates    [address] [address] [phone]  1/1/2019 - None Entered    [facility] [id number]         Subscriber Name Subscriber Birth Date Member ID       CATIA BROWN 2/13/1933 [id number]               Secondary Coverage       Payor Plan Insurance Group Employer/Plan Group    [place]        Payor Plan Address Payor Plan Phone Number Payor Plan Fax Number Effective Dates    [address] [phone]  7/7/2016 - None Entered    [facility] [id number]         Subscriber Name Subscriber Birth Date Member ID       CATIA BROWN 2/13/1933 [id number]                     Emergency Contacts        (Rel.) Home Phone Work Phone Mobile Phone    joan morrow (Sister) -- -- [phone]    Kaci Montanez (Daughter) " "[phone] -- [phone]                 History & Physical        Leslie Packer, DO at 23 1709            [facility]   HISTORY AND PHYSICAL      Name:  Jonathan Brown   Age:  89 y.o.  Sex:  male  :  1933  MRN:  [id number]   Visit Number:  [id number]  Admission Date:  2023  Date Of Service:  23  Primary Care Physician:  Sri Bangura MD    Chief Complaint:     Fall, hip pain    History Of Presenting Illness:      The patient is a chronically ill 89-year-old with medical history of chronic kidney disease stage IV, hypertension, hyperlipidemia, COPD with chronic respiratory failure, hypothyroidism, sick sinus syndrome status post pacemaker, paroxysmal atrial fibrillation, impaired mobility and ADLs, hearing loss, who had presented from home after sustaining a fall.  History obtained from ER record, patient at bedside.  No family available.  He did note he had been \"sick\" for a few days, somewhat short of breath with a cough.  Notes that earlier this morning while ambulating he had felt lightheaded, subsequently fell and landed on his right hip and lower leg.  Unable to ambulate thereafter.  He notes he does have COPD, has not smoked in many years.  Does use inhalers at home.  Notes sister helps him some, is his POA, also has a daughter.    In the ER, he was normotensive, heart rate in the 80s, afebrile.  Saturating 95% on 4 L of oxygen.  White count 10 hemoglobin 8.7 platelets 128.  Procalcitonin 0.07 proBNP 1600 high-sensitivity troponin of 80.  Creatinine 2.68 potassium 5 negative flu COVID test.  ABG pH 7.409 pCO2 38 pO2 58.  Urinalysis with small leukocyte esterase and 2+ bacteria.  CT cervical spine without contrast unremarkable.  CT scan of the head without contrast unremarkable.  3 view right knee x-ray unremarkable.  Bilateral hip x-rays with evidence of a subcapital right femoral neck fracture with displacement noted.  Chest x-ray with " chronic findings.  The patient was given pain control, bronchodilators, steroids.  Dr. Oliveros with orthopedics to see in consult.  We were asked admit thereafter.    Review Of Systems:    All systems were reviewed and negative except as mentioned in history of presenting illness, assessment and plan.    Past Medical History: Patient  has a past medical history of Anemia, Arthritis, Asthma, Blood clotting disorder, COPD (chronic obstructive pulmonary disease), H/O emphysema, Hiatal hernia, History of blood transfusion, Hyperlipidemia, Hypertension, Kidney infection, Kidney stones, Positive TB test, and Thyroid disease.    Past Surgical History: Patient  has a past surgical history that includes Hernia repair; Knee surgery; AAA repair, open; and Cardiac electrophysiology procedure (N/A, 4/8/2019).    Social History: Patient  reports that he quit smoking about 25 years ago. His smoking use included cigarettes. He has a 45.00 pack-year smoking history. He has been exposed to tobacco smoke. He has never used smokeless tobacco. He reports that he does not drink alcohol and does not use drugs.    Family History:  Patient's family history has been reviewed and found to be noncontributory.     Allergies:      Atorvastatin    Home Medications:    Prior to Admission Medications       Prescriptions Last Dose Informant Patient Reported? Taking?    amLODIPine (NORVASC) 5 MG tablet   Yes No    Take 1 tablet by mouth Daily.    apixaban (ELIQUIS) 2.5 MG tablet tablet   Yes No    Take 1 tablet by mouth Every 12 (Twelve) Hours.    aspirin 81 MG EC tablet   Yes No    Take 1 tablet by mouth Daily.    Cyanocobalamin (VITAMIN B-12 PO)   Yes No    Take  by mouth.    doxazosin (CARDURA) 4 MG tablet   No No    Take 1 tablet by mouth Every Night.    ferrous sulfate 325 (65 FE) MG tablet   Yes No    Take 1 tablet by mouth Daily With Breakfast.    finasteride (PROSCAR) 5 MG tablet   No No    Take 1 tablet by mouth Daily.    irbesartan (AVAPRO)  150 MG tablet   Yes No    irbesartan 150 mg tablet   TAKE 1 TABLET EVERY DAY    irbesartan (AVAPRO) 300 MG tablet   Yes No    Take 1 tablet by mouth Every Night.    levothyroxine (SYNTHROID, LEVOTHROID) 100 MCG tablet   Yes No    levothyroxine 100 mcg tablet   TAKE 1 TABLET EVERY DAY    montelukast (SINGULAIR) 10 MG tablet   Yes No    montelukast 10 mg tablet    Multiple Vitamins-Minerals (PRESERVISION AREDS 2 PO)   Yes No    Take  by mouth.    omeprazole (priLOSEC) 20 MG capsule   Yes No    Take 1 capsule by mouth Daily.    ondansetron (ZOFRAN) 4 MG tablet   No No    Take 1 tablet by mouth Every 8 (Eight) Hours As Needed for Nausea or Vomiting.    Polyethylene Glycol powder   No No    Take 1 cap full (17 grams) in 8 oz of noncarbonated beverage and drink once daily    simvastatin (ZOCOR) 40 MG tablet   Yes No    simvastatin 40 mg tablet    tamsulosin (FLOMAX) 0.4 MG capsule 24 hr capsule   Yes No    tamsulosin 0.4 mg capsule    tiotropium (SPIRIVA) 18 MCG per inhalation capsule   Yes No    Spiriva with HandiHaler 18 mcg and inhalation capsules          ED Medications:    Medications   sodium chloride 0.9 % flush 10 mL (has no administration in time range)   Morphine sulfate (PF) injection 2 mg (2 mg Intravenous Given 12/26/23 1304)   ondansetron (ZOFRAN) injection 4 mg (4 mg Intravenous Given 12/26/23 1303)   methylPREDNISolone sodium succinate (SOLU-Medrol) injection 125 mg (125 mg Intravenous Given 12/26/23 1435)   ipratropium-albuterol (DUO-NEB) nebulizer solution 3 mL (3 mL Nebulization Given 12/26/23 1439)   magnesium sulfate 2g/50 mL (PREMIX) infusion (0 g Intravenous Stopped 12/26/23 1529)   morphine injection 4 mg (4 mg Intravenous Given 12/26/23 1438)     Vital Signs:  Temp:  [98.2 °F (36.8 °C)] 98.2 °F (36.8 °C)  Heart Rate:  [77-98] 89  Resp:  [18] 18  BP: ()/(55-83) 109/63        12/26/23  1213   Weight: 64.4 kg (142 lb)     Body mass index is 19.8 kg/m².    Physical Exam:     Most recent vital  "Signs: /63   Pulse 89   Temp 98.2 °F (36.8 °C) (Oral)   Resp 18   Ht 180.3 cm (71\")   Wt 64.4 kg (142 lb)   SpO2 92%   BMI 19.80 kg/m²     Physical Exam  Constitutional:       Appearance: He is ill-appearing.   HENT:      Head: Normocephalic.      Ears:      Comments: Hard of hearing  Eyes:      Extraocular Movements: Extraocular movements intact.      Pupils: Pupils are equal, round, and reactive to light.   Cardiovascular:      Rate and Rhythm: Normal rate and regular rhythm.      Pulses: Normal pulses.      Heart sounds: No murmur heard.  Pulmonary:      Breath sounds: No stridor. Wheezing present. No rhonchi.   Abdominal:      General: Abdomen is flat. Bowel sounds are normal. There is no distension.      Tenderness: There is no abdominal tenderness. There is no guarding.   Musculoskeletal:         General: Deformity (Right leg externally rotated tender) present.      Right lower leg: No edema.      Left lower leg: No edema.   Skin:     General: Skin is warm.      Capillary Refill: Capillary refill takes less than 2 seconds.      Findings: No bruising or lesion.   Neurological:      General: No focal deficit present.      Mental Status: He is alert. Mental status is at baseline.      Motor: Weakness present.   Psychiatric:         Mood and Affect: Mood normal.         Thought Content: Thought content normal.         Laboratory data:    I have reviewed the labs done in the emergency room.    Results from last 7 days   Lab Units 12/26/23  1216   SODIUM mmol/L 139   POTASSIUM mmol/L 5.0   CHLORIDE mmol/L 101   CO2 mmol/L 24.8   BUN mg/dL 44*   CREATININE mg/dL 2.68*   CALCIUM mg/dL 9.5   BILIRUBIN mg/dL 0.7   ALK PHOS U/L 103   ALT (SGPT) U/L 11   AST (SGOT) U/L 19   GLUCOSE mg/dL 127*     Results from last 7 days   Lab Units 12/26/23  1216   WBC 10*3/mm3 10.49   HEMOGLOBIN g/dL 8.7*   HEMATOCRIT % 27.9*   PLATELETS 10*3/mm3 128*         Results from last 7 days   Lab Units 12/26/23  1404 " 12/26/23  1216   HSTROP T ng/L 77* 80*     Results from last 7 days   Lab Units 12/26/23  1216   PROBNP pg/mL 1,662.0             Results from last 7 days   Lab Units 12/26/23  1327   PH, ARTERIAL pH units 7.409   PO2 ART mm Hg 58.9*   PCO2, ARTERIAL mm Hg 38.7   HCO3 ART mmol/L 24.4     Results from last 7 days   Lab Units 12/26/23  1350   COLOR UA  Yellow   GLUCOSE UA  Negative   KETONES UA  Negative   BLOOD UA  Negative   LEUKOCYTES UA  Small (1+)*   PH, URINE  6.0   BILIRUBIN UA  Negative   UROBILINOGEN UA  0.2 E.U./dL   RBC UA /HPF None Seen   WBC UA /HPF 6-10*       Pain Management Panel  More data may exist         Latest Ref Rng & Units 10/19/2021 10/9/2015   Pain Management Panel   Creatinine, Urine mg/dL 74.5  86.9        EKG:      Atrial paced rhythm    Radiology:    CT Cervical Spine Without Contrast    Result Date: 12/26/2023  PROCEDURE: CT CERVICAL SPINE WO CONTRAST-  HISTORY: Neck trauma (Age >= 65y), neck pain  TECHNIQUE: Thin section axial CT with sagittal and coronal reconstructions  FINDINGS: No fracture is present. Alignment is normal.  No bony canal stenosis is seen. Mild diffuse degenerative disc disease is present. Mild facet arthropathy is noted.      Negative CT evaluation of the cervical spine for acute bony injury.    This study was performed with techniques to keep radiation doses as low as reasonably achievable (ALARA). Individualized dose reduction techniques using automated exposure control or adjustment of vA and/or kV according to the patient size were employed.  This report was signed and finalized on 12/26/2023 2:34 PM by Niels Sierra MD.      CT Head Without Contrast    Result Date: 12/26/2023  PROCEDURE: CT HEAD WO CONTRAST-  HISTORY: Head trauma, minor (Age >= 65y), pain  COMPARISON: 7/15/2022  TECHNIQUE: Noncontrast exam  FINDINGS: Advanced atrophy and chronic ischemic white matter changes are noted.  No cortical edema is present. There is no mass or hemorrhage. Ventricles are  normal.  Bone windows show no skull fracture or obvious destructive lesion.      1. No acute intracranial abnormality or obvious mass. 2. Atrophy and chronic ischemic white matter changes as above.   This study was performed with techniques to keep radiation doses as low as reasonably achievable (ALARA). Individualized dose reduction techniques using automated exposure control or adjustment of vA and/or kV according to the patient size were employed.    This report was signed and finalized on 12/26/2023 2:31 PM by Niels Sierra MD.      XR Hips Bilateral With or Without Pelvis 3-4 View    Result Date: 12/26/2023  PROCEDURE: XR HIPS BILATERAL W OR WO PELVIS 3-4 VIEW-  THREE VIEW  HISTORY: trauma  FINDINGS:  Three views show fracture of the subcapital right femoral neck.  There is moderate displacement. Left proximal femur is intact.  The joint spaces appear normal.       Subcapital right femoral neck fracture as above.    This report was signed and finalized on 12/26/2023 2:19 PM by Niels Sierra MD.      XR Knee 3 View Right    Result Date: 12/26/2023  PROCEDURE: XR KNEE 3 VW RIGHT-  THREE VIEW  HISTORY: trauma, pain  FINDINGS:  Three views show no evidence of an acute, displaced fracture or dislocation of the visualized bony architecture.  The joint spaces appear normal. Generalized osteopenia is present.      Unremarkable exam.     This report was signed and finalized on 12/26/2023 2:18 PM by Niels Sierra MD.      XR Chest 1 View    Result Date: 12/26/2023  Chest single view  COMPARISON: Chest from 4-8-2019  FINDINGS: Cardiac silhouette is of normal size. Left subclavian pacemaker is present.  Mild linear density is identified in the perihilar regions bilaterally, consistent with atelectasis. No localized airspace infiltrates are seen.      1. Chronic changes and mild perihilar atelectasis, as described. 2. No evidence of acute infiltrate.  This report was signed and finalized on 12/26/2023 1:33 PM by Erlin  MD Perla.       Assessment:    Right femoral neck fracture, POA  Acute on chronic hypoxic respiratory failure, POA   COPD with exacerbation  History of sick sinus syndrome status post pacemaker  Atrial fibrillation on Eliquis  Hearing loss  Acute on chronic kidney disease stage IV    Plan:    Admit as inpatient    Hip fracture:  Dr. Oliveros with orthopedics to see in consultation.  After initial discussion, patient not too sure if he wants to proceed with surgery, discussed significant risk without surgery.  He is concerned about his underlying medical conditions and anesthesia.  He will need preop evaluation by cardiology and improvement in pulmonary status is much as possible.  Tentatively plan for surgery may be Thursday.  As needed pain control.    COPD:  Likely worsened due to fall, however had symptoms of exacerbation prior to.  Currently oxygen saturations are stable on 4 L.  Will continue with bronchodilators and prednisone.    Sick sinus syndrome:  Follows with Dr. Haro.  Has pacemaker, also with atrial fibrillation chronically on Eliquis.    Renal failure:  Creatinine appears to be worsening from most recent in system.  Will place Lemons catheter for any possible retention, impaired mobility currently as well.  History of BPH.  Will consult with nephrology.  Avoid nephrotoxic agents.    Patient otherwise meets inpatient level care with anticipated stay greater than 2 midnights.  Further recommendation be depend upon improvement of clinical condition.  Plan care discussed with the patient no family currently at bedside.  He did note that he is a DNI/DNR.    Risk Assessment: High  DVT Prophylaxis: SCDs  Code Status: DNR  Diet: As tolerated, has dentures    Advance Care Planning  ACP discussion was held with the patient during this visit. Patient has an advance directive in EMR which is still valid.            Leslie Packer,   12/26/23  17:09 EST    Dictated utilizing Dragon dictation.    Electronically  signed by Leslie Packer DO at 12/26/23 1818       Prior to Admission Medications       Prescriptions Last Dose Informant Patient Reported? Taking?    amLODIPine (NORVASC) 5 MG tablet   Yes No    Take 1 tablet by mouth Daily.    apixaban (ELIQUIS) 2.5 MG tablet tablet   Yes No    Take 1 tablet by mouth Every 12 (Twelve) Hours.    aspirin 81 MG EC tablet   Yes No    Take 1 tablet by mouth Daily.    Cyanocobalamin (VITAMIN B-12 PO)   Yes No    Take  by mouth.    doxazosin (CARDURA) 4 MG tablet   No No    Take 1 tablet by mouth Every Night.    ferrous sulfate 325 (65 FE) MG tablet   Yes No    Take 1 tablet by mouth Daily With Breakfast.    finasteride (PROSCAR) 5 MG tablet   No No    Take 1 tablet by mouth Daily.    irbesartan (AVAPRO) 150 MG tablet   Yes No    irbesartan 150 mg tablet   TAKE 1 TABLET EVERY DAY    irbesartan (AVAPRO) 300 MG tablet   Yes No    Take 1 tablet by mouth Every Night.    levothyroxine (SYNTHROID, LEVOTHROID) 100 MCG tablet   Yes No    levothyroxine 100 mcg tablet   TAKE 1 TABLET EVERY DAY    montelukast (SINGULAIR) 10 MG tablet   Yes No    montelukast 10 mg tablet    Multiple Vitamins-Minerals (PRESERVISION AREDS 2 PO)   Yes No    Take  by mouth.    omeprazole (priLOSEC) 20 MG capsule   Yes No    Take 1 capsule by mouth Daily.    ondansetron (ZOFRAN) 4 MG tablet   No No    Take 1 tablet by mouth Every 8 (Eight) Hours As Needed for Nausea or Vomiting.    Polyethylene Glycol powder   No No    Take 1 cap full (17 grams) in 8 oz of noncarbonated beverage and drink once daily    simvastatin (ZOCOR) 40 MG tablet   Yes No    simvastatin 40 mg tablet    tamsulosin (FLOMAX) 0.4 MG capsule 24 hr capsule   Yes No    tamsulosin 0.4 mg capsule    tiotropium (SPIRIVA) 18 MCG per inhalation capsule   Yes No    Spiriva with HandiHaler 18 mcg and inhalation capsules             Consult Notes (most recent note)        Diego Oliveros MD at 12/27/23 5567        Consult Orders    1. Inpatient  Orthopedic Surgery Consult [[id number]] ordered by Leslie Packer DO at 23 5517                     Patient Care Team:  Sri Bangura MD as PCP - General    Chief complaint right hip pain    Subjective     Patient is a 89 y.o. male presents with right hip pain.  From a ground-level fall.  Complains of pain only in the right hip.        Review of Systems   Pertinent items are noted in HPI    History  Past Medical History:   Diagnosis Date    Anemia     Arthritis     Asthma     Blood clotting disorder     COPD (chronic obstructive pulmonary disease)     H/O emphysema     Hiatal hernia     History of blood transfusion     Hyperlipidemia     Hypertension     Kidney infection     Kidney stones     Positive TB test     Thyroid disease      Past Surgical History:   Procedure Laterality Date    ABDOMINAL AORTIC ANEURYSM REPAIR      CARDIAC ELECTROPHYSIOLOGY PROCEDURE N/A 2019    Procedure: Device Implant;  Surgeon: Dom Gallardo MD;  Location: [facility] INVASIVE LOCATION;  Service: Cardiology    HERNIA REPAIR      KNEE SURGERY       Family History   Problem Relation Age of Onset    No Known Problems Mother     No Known Problems Father     Bone cancer Sister     Lung cancer Brother     Colon cancer Neg Hx      Social History     Tobacco Use    Smoking status: Former     Packs/day: 1.50     Years: 30.00     Additional pack years: 0.00     Total pack years: 45.00     Types: Cigarettes     Quit date: 3/5/1998     Years since quittin.8     Passive exposure: Past    Smokeless tobacco: Never   Vaping Use    Vaping Use: Never used   Substance Use Topics    Alcohol use: No    Drug use: No     Medications Prior to Admission   Medication Sig Dispense Refill Last Dose    amLODIPine (NORVASC) 5 MG tablet Take 1 tablet by mouth Daily.       apixaban (ELIQUIS) 2.5 MG tablet tablet Take 1 tablet by mouth Every 12 (Twelve) Hours.       aspirin 81 MG EC tablet Take 1 tablet by mouth Daily.        Cyanocobalamin (VITAMIN B-12 PO) Take  by mouth.       doxazosin (CARDURA) 4 MG tablet Take 1 tablet by mouth Every Night. 90 tablet 3     ferrous sulfate 325 (65 FE) MG tablet Take 1 tablet by mouth Daily With Breakfast.       finasteride (PROSCAR) 5 MG tablet Take 1 tablet by mouth Daily. 90 tablet 3     irbesartan (AVAPRO) 150 MG tablet irbesartan 150 mg tablet   TAKE 1 TABLET EVERY DAY       irbesartan (AVAPRO) 300 MG tablet Take 1 tablet by mouth Every Night.       levothyroxine (SYNTHROID, LEVOTHROID) 100 MCG tablet levothyroxine 100 mcg tablet   TAKE 1 TABLET EVERY DAY       montelukast (SINGULAIR) 10 MG tablet montelukast 10 mg tablet       Multiple Vitamins-Minerals (PRESERVISION AREDS 2 PO) Take  by mouth.       omeprazole (priLOSEC) 20 MG capsule Take 1 capsule by mouth Daily.       ondansetron (ZOFRAN) 4 MG tablet Take 1 tablet by mouth Every 8 (Eight) Hours As Needed for Nausea or Vomiting. 30 tablet 1     Polyethylene Glycol powder Take 1 cap full (17 grams) in 8 oz of noncarbonated beverage and drink once daily 500 g 3     simvastatin (ZOCOR) 40 MG tablet simvastatin 40 mg tablet       tamsulosin (FLOMAX) 0.4 MG capsule 24 hr capsule tamsulosin 0.4 mg capsule       tiotropium (SPIRIVA) 18 MCG per inhalation capsule Spiriva with HandiHaler 18 mcg and inhalation capsules        Allergies:  Atorvastatin    Objective     Vital Signs  Temp:  [97.2 °F (36.2 °C)-98.8 °F (37.1 °C)] 98.5 °F (36.9 °C)  Heart Rate:  [72-98] 93  Resp:  [16-20] 16  BP: ()/(49-83) 128/71    Physical Exam:      General Appearance:  Alert, cooperative, in no acute distress   Head:  Normocephalic, without obvious abnormality, atraumatic   Eyes:  Lids and lashes normal, conjunctivae and sclerae normal, no icterus, no pallor, corneas clear, PERRLA   Ears:  Ears appear intact with no abnormalities noted   Throat:  No oral lesions, no thrush, oral mucosa moist   Neck:  No adenopathy, supple, trachea midline, no thyromegaly, no  carotid bruit, no JVD   Back:  No kyphosis present, no scoliosis present, no skin lesions, erythema or scars, no tenderness to percussion or palpation, range of motion normal   Lungs:  Clear to auscultation, respirations regular, even and unlabored    Heart:  Regular rhythm and normal rate, normal S1 and S2, no murmur, no gallop, no rub, no click   Chest Wall:  No abnormalities observed   Abdomen:  Normal bowel sounds, no masses, no organomegaly, soft non-tender, non-distended, no guarding, no rebound tenderness   Rectal:  Deferred   Extremities:  Moves all extremities well, no edema, no cyanosis, no redness   Pulses:  Pulses palpable and equal bilaterally   Skin:  No bleeding, bruising or rash   Lymph nodes:  No palpable adenopathy   Neurologic:  Cranial nerves 2 - 12 grossly intact, sensation intact, DTR present and equal bilaterally                                                                               Results Review:    I reviewed the patient's new clinical results.    Assessment & Plan       Acute hypoxic respiratory failure      Right hip displaced femoral neck fracture.    Plan would be for a right hip hemiarthroplasty pending medical optimization.  If patient decides for surgical intervention.  Will discuss with the patient and the power of  sister further for for definitive plans.  Will keep n.p.o. at midnight in case we decide for surgical intervention.    I had discussion with the patient's sister-in-law power of  who provides some care for him he is a household ambulator with a walker.  He lives by himself we did discuss the risk and benefits of surgical versus nonoperative treatment including pneumonia risk with anesthesia stroke heart attack death infection and we will plan for surgical intervention pending medical optimization    I discussed the patients findings and my recommendations with patient.     Diego Oliveros MD  12/27/23  08:13 EST            Electronically signed by  Diego Oliveros MD at 12/27/23 0832

## 2023-12-27 NOTE — PROGRESS NOTES
"Dietitian Assessment    Patient Name: Jonathan Brown  YOB: 1933  MRN: [id number]  Admission date: 12/26/2023    Comment:    Pt w/ underweight BMI for age and MST score of 2 r/t difficulty chewing/swallowing. PO intake averaging 75% x 1 meal reported. Will order Boost Breeze daily since it is low in phosphorus and has 0mg of potassium. RD to follow-up.     Clinical Nutrition Assessment      Reason for Assessment MST    H&P  Past Medical History:   Diagnosis Date    Anemia     Arthritis     Asthma     Blood clotting disorder     COPD (chronic obstructive pulmonary disease)     H/O emphysema     Hiatal hernia     History of blood transfusion     Hyperlipidemia     Hypertension     Kidney infection     Kidney stones     Positive TB test     Thyroid disease        Past Surgical History:   Procedure Laterality Date    ABDOMINAL AORTIC ANEURYSM REPAIR      CARDIAC ELECTROPHYSIOLOGY PROCEDURE N/A 4/8/2019    Procedure: Device Implant;  Surgeon: Dom Gallardo MD;  Location: [facility] INVASIVE LOCATION;  Service: Cardiology    HERNIA REPAIR      KNEE SURGERY              Current Problems   Acute on Chronic hypoxic respiratory failure  COPD   Afib   Acute on Chronic kidney disease stage IV     Encounter Information        Trending Narrative     12/27: Pt w/ underweight BMI for age and MST score of 2 r/t difficulty chewing/swallowing. PO intake averaging 75% x 1 meal reported. Will order supplementation to encourage PO intake and provide additional calories and protein.      Anthropometrics        Current Height, Weight Height: 180.3 cm (71\")  Weight: 68.4 kg (150 lb 12.7 oz) (12/27/23 0400)   Trending Weight Hx     This admission:              PTA:     Wt Readings from Last 30 Encounters:   12/27/23 0400 68.4 kg (150 lb 12.7 oz)   12/26/23 1705 68.6 kg (151 lb 3.8 oz)   12/26/23 1213 64.4 kg (142 lb)   09/19/23 0907 71.2 kg (157 lb)   08/28/23 0854 71.2 kg (157 lb)   04/11/23 1340 72.6 kg (160 " lb)   10/18/22 1034 68.9 kg (152 lb)   08/01/22 1029 70.8 kg (156 lb)   07/15/22 2252 72.6 kg (160 lb)   04/08/19 0738 83 kg (183 lb)   02/05/19 1324 81.6 kg (180 lb)   09/27/18 1319 83.9 kg (185 lb)   03/06/18 0842 83.9 kg (185 lb)   08/28/17 0922 81.2 kg (179 lb)   04/25/14 1003 88.5 kg (195 lb)      BMI kg/m2 Body mass index is 21.03 kg/m².     Labs        Pertinent Labs     Results from last 7 days   Lab Units 12/27/23  0434 12/26/23  1216   SODIUM mmol/L 138 139   POTASSIUM mmol/L 4.9 5.0   CHLORIDE mmol/L 102 101   CO2 mmol/L 22.2 24.8   BUN mg/dL 48* 44*   CREATININE mg/dL 2.77* 2.68*   CALCIUM mg/dL 9.2 9.5   BILIRUBIN mg/dL  --  0.7   ALK PHOS U/L  --  103   ALT (SGPT) U/L  --  11   AST (SGOT) U/L  --  19   GLUCOSE mg/dL 162* 127*       Results from last 7 days   Lab Units 12/27/23  0434 12/26/23  1216   MAGNESIUM mg/dL  --  2.4   HEMOGLOBIN g/dL 8.2* 8.7*   HEMATOCRIT % 26.3* 27.9*       Lab Results   Component Value Date    HGBA1C 5.45 10/19/2021            Medications       Scheduled Medications budesonide-formoterol, 2 puff, Inhalation, BID - RT  ceFAZolin, 2 g, Intravenous, Once  cefTRIAXone, 1,000 mg, Intravenous, Q24H  clindamycin, 900 mg, Intravenous, Once  ethyl alcohol, 2 swab , Nasal, Once  levothyroxine, 100 mcg, Oral, Q AM  montelukast, 10 mg, Oral, Nightly  predniSONE, 40 mg, Oral, Daily With Breakfast  senna-docusate sodium, 2 tablet, Oral, BID  sodium chloride, 10 mL, Intravenous, Q12H        Infusions       PRN Medications   acetaminophen **OR** acetaminophen **OR** acetaminophen    aluminum-magnesium hydroxide-simethicone    senna-docusate sodium **AND** polyethylene glycol **AND** bisacodyl **AND** bisacodyl    HYDROcodone-acetaminophen    ipratropium-albuterol    melatonin    nitroglycerin    ondansetron ODT **OR** ondansetron    sodium chloride    sodium chloride    sodium chloride     Physical Findings        Trending Physical   Appearance, NFPE    --  Edema  1+ generalized edema  2+  mild L&R leg, ankle edema  1+ L&R foot   Bowel Function None reported   Tubes Peripheral IV   Chewing/Swallowing WNL   Skin WNL      Estimated/Assessed Needs       Energy Requirements    EST Needs, Method, Wt used 1704-2045kcals/day using CBW and 25-30kcals/kg       Protein Requirements    EST Needs, Method, Wt used 68g protein per day using 1g/kg       Fluid Requirements     Estimated Needs (mL/day) 2045mL per day       Current Nutrition Orders & Evaluation of Intake       Oral Nutrition     Food Allergies    Current PO Diet Diet: Renal Diets; Low Sodium (2-3g), Low Potassium, Low Phosphorus; Texture: Regular Texture (IDDSI 7); Fluid Consistency: Thin (IDDSI 0)   Supplement    PO Evaluation     Trending % PO Intake 12/27: 75% x 1 meal     Enteral Nutrition    Enteral Route    Order, Modulars, Flushes    Residual/Tolerance    TF Observation         Parenteral Nutrition     TPN Route    Total # Days on TPN    TPN Order, Lipid Details    MVI & Trace Element Freq    TPN Observation       Nutrition Diagnosis         Nutrition Dx Problem 1 Increased estimated needs r/t CKD as evidenced by need for oral nutrition supplement to meet needs.       Nutrition Dx Problem 2        Intervention Goal         Intervention Goal(s) PO intake to meet >50% of estimated needs  Adhere to supplement ordered  Maintain current body weight     Nutrition Intervention        RD Action Will order Boost Breeze daily     Nutrition Prescription          Diet Prescription Renal: low sodium, low phosphorus, low potassium   Supplement Prescription Boost Breeze daily     Enteral Prescription        TPN Prescription      Monitor/Evaluation        Monitor Per protocol, PO intake, Supplement intake, Pertinent labs, Weight, Skin status, Symptoms, Hemodynamic stability     RD to follow-up.     Electronically signed by:  Hussein Borges RD  12/27/23 11:54 EST

## 2023-12-27 NOTE — PROGRESS NOTES
"    [facility]    Name:  Jonathan Brown   Age:  89 y.o.  Sex:  male  :  1933  MRN:  [id number]   Visit Number:  [id number]  Admission Date:  2023  Date Of Service:  23  Primary Care Physician:  Sri Bangura MD     LOS: 1 day :    Chief Complaint:      Follow-up hip fracture, hypoxia    Subjective:    Patient was seen this morning, he was attempting to eat breakfast, appeared to have some issues with dysphagia but also did not have his dentures.  Apparently he had already talked with orthopedics and they were recommending surgery, was notified by RN later that patient is considering not doing surgery.    Hospital Course:    The patient is a chronically ill 89-year-old with medical history of chronic kidney disease stage IV, hypertension, hyperlipidemia, COPD with chronic respiratory failure, hypothyroidism, sick sinus syndrome status post pacemaker, paroxysmal atrial fibrillation, impaired mobility and ADLs, hearing loss, who had presented from home after sustaining a fall.  History obtained from ER record, patient at bedside.  No family available.  He did note he had been \"sick\" for a few days, somewhat short of breath with a cough.  Notes that earlier this morning while ambulating he had felt lightheaded, subsequently fell and landed on his right hip and lower leg.  Unable to ambulate thereafter.  He notes he does have COPD, has not smoked in many years.  Does use inhalers at home.  Notes sister helps him some, is his POA, also has a daughter.     In the ER, he was normotensive, heart rate in the 80s, afebrile.  Saturating 95% on 4 L of oxygen.  White count 10 hemoglobin 8.7 platelets 128.  Procalcitonin 0.07 proBNP 1600 high-sensitivity troponin of 80.  Creatinine 2.68 potassium 5 negative flu COVID test.  ABG pH 7.409 pCO2 38 pO2 58.  Urinalysis with small leukocyte esterase and 2+ bacteria.  CT cervical spine without contrast unremarkable.  " CT scan of the head without contrast unremarkable.  3 view right knee x-ray unremarkable.  Bilateral hip x-rays with evidence of a subcapital right femoral neck fracture with displacement noted.  Chest x-ray with chronic findings.  The patient was given pain control, bronchodilators, steroids.  Dr. Oliveros with orthopedics to see in consult.  We were asked admit thereafter.    Review of Systems:     All systems were reviewed and negative except as mentioned in subjective, assessment and plan.    Vital Signs:    Temp:  [97.2 °F (36.2 °C)-98.8 °F (37.1 °C)] 97.6 °F (36.4 °C)  Heart Rate:  [72-98] 76  Resp:  [9-20] 9  BP: ()/(49-77) 123/69    Intake and output:    I/O last 3 completed shifts:  In: -   Out: 1475 [Urine:1475]  I/O this shift:  In: 240 [P.O.:240]  Out: 70 [Urine:70]    Physical Examination:    General Appearance:  Alert and cooperative.  Chronically ill-appearing   Head:  Atraumatic and normocephalic.   Eyes: Conjunctivae and sclerae normal, no icterus. No pallor.   Throat: No oral lesions, no thrush, oral mucosa moist.   Neck: Supple, trachea midline, no thyromegaly.   Lungs:   Scattered wheezes   Heart:  Normal S1 and S2, no murmur, no gallop, no rub. No JVD.   Abdomen:   Normal bowel sounds, no masses, no organomegaly. Soft, nontender, nondistended, no rebound tenderness.   Extremities: Right hip externally rotated.   Skin: No bleeding or rash.   Neurologic: Alert and oriented x 3. No facial asymmetry. Moves all four limbs. No tremors.  Weakness, hard of hearing     Laboratory results:    Results from last 7 days   Lab Units 12/27/23  0434 12/26/23  1216   SODIUM mmol/L 138 139   POTASSIUM mmol/L 4.9 5.0   CHLORIDE mmol/L 102 101   CO2 mmol/L 22.2 24.8   BUN mg/dL 48* 44*   CREATININE mg/dL 2.77* 2.68*   CALCIUM mg/dL 9.2 9.5   BILIRUBIN mg/dL  --  0.7   ALK PHOS U/L  --  103   ALT (SGPT) U/L  --  11   AST (SGOT) U/L  --  19   GLUCOSE mg/dL 162* 127*     Results from last 7 days   Lab Units  12/27/23  0434 12/26/23  1216   WBC 10*3/mm3 12.35* 10.49   HEMOGLOBIN g/dL 8.2* 8.7*   HEMATOCRIT % 26.3* 27.9*   PLATELETS 10*3/mm3 126* 128*         Results from last 7 days   Lab Units 12/26/23  1404 12/26/23  1216   HSTROP T ng/L 77* 80*         Recent Labs     12/26/23  1327   PHART 7.409   WLD9KWM 38.7   PO2ART 58.9*   EBR8XCG 24.4   BASEEXCESS -0.2*      I have reviewed the patient's laboratory results.    Radiology results:    CT Cervical Spine Without Contrast    Result Date: 12/26/2023  PROCEDURE: CT CERVICAL SPINE WO CONTRAST-  HISTORY: Neck trauma (Age >= 65y), neck pain  TECHNIQUE: Thin section axial CT with sagittal and coronal reconstructions  FINDINGS: No fracture is present. Alignment is normal.  No bony canal stenosis is seen. Mild diffuse degenerative disc disease is present. Mild facet arthropathy is noted.      Impression: Negative CT evaluation of the cervical spine for acute bony injury.    This study was performed with techniques to keep radiation doses as low as reasonably achievable (ALARA). Individualized dose reduction techniques using automated exposure control or adjustment of vA and/or kV according to the patient size were employed.  This report was signed and finalized on 12/26/2023 2:34 PM by Niels Sierra MD.      CT Head Without Contrast    Result Date: 12/26/2023  PROCEDURE: CT HEAD WO CONTRAST-  HISTORY: Head trauma, minor (Age >= 65y), pain  COMPARISON: 7/15/2022  TECHNIQUE: Noncontrast exam  FINDINGS: Advanced atrophy and chronic ischemic white matter changes are noted.  No cortical edema is present. There is no mass or hemorrhage. Ventricles are normal.  Bone windows show no skull fracture or obvious destructive lesion.      Impression: 1. No acute intracranial abnormality or obvious mass. 2. Atrophy and chronic ischemic white matter changes as above.   This study was performed with techniques to keep radiation doses as low as reasonably achievable (ALARA). Individualized  dose reduction techniques using automated exposure control or adjustment of vA and/or kV according to the patient size were employed.    This report was signed and finalized on 12/26/2023 2:31 PM by Niels Sierra MD.      XR Hips Bilateral With or Without Pelvis 3-4 View    Result Date: 12/26/2023  PROCEDURE: XR HIPS BILATERAL W OR WO PELVIS 3-4 VIEW-  THREE VIEW  HISTORY: trauma  FINDINGS:  Three views show fracture of the subcapital right femoral neck.  There is moderate displacement. Left proximal femur is intact.  The joint spaces appear normal.       Impression: Subcapital right femoral neck fracture as above.    This report was signed and finalized on 12/26/2023 2:19 PM by Niels Sierra MD.      XR Knee 3 View Right    Result Date: 12/26/2023  PROCEDURE: XR KNEE 3 VW RIGHT-  THREE VIEW  HISTORY: trauma, pain  FINDINGS:  Three views show no evidence of an acute, displaced fracture or dislocation of the visualized bony architecture.  The joint spaces appear normal. Generalized osteopenia is present.      Impression: Unremarkable exam.     This report was signed and finalized on 12/26/2023 2:18 PM by Niels Sierra MD.      XR Chest 1 View    Result Date: 12/26/2023  Chest single view  COMPARISON: Chest from 4-8-2019  FINDINGS: Cardiac silhouette is of normal size. Left subclavian pacemaker is present.  Mild linear density is identified in the perihilar regions bilaterally, consistent with atelectasis. No localized airspace infiltrates are seen.      Impression: 1. Chronic changes and mild perihilar atelectasis, as described. 2. No evidence of acute infiltrate.  This report was signed and finalized on 12/26/2023 1:33 PM by Erlin Duncan MD.     I have reviewed the patient's radiology reports.    Medication Review:     I have reviewed the patient's active and prn medications.     Problem List:      Acute hypoxic respiratory failure      Assessment:    Right femoral neck fracture, POA  Acute on chronic hypoxic  respiratory failure, POA   COPD with exacerbation  History of sick sinus syndrome status post pacemaker  Atrial fibrillation on Eliquis  Hearing loss  Acute on chronic kidney disease stage IV    Plan:    Hip fracture:  Was seen by orthopedics in consultation.  Patient initially agreeable, power of  was as well.  Was notified later today that patient does not want to go to nursing home, is not sure if he wants surgery now despite multiple conversations about risk and benefit of both doing the surgery and not doing the surgery.  Will need further discussions with family.    If patient chooses not to proceed with operative intervention, would recommend palliative and hospice evaluations and likely placement.     COPD:  Likely worsened due to fall, however had symptoms of exacerbation prior to.  Currently oxygen saturations are stable on 4 L.  Will continue with bronchodilators and prednisone.     Sick sinus syndrome:  Follows with Dr. Haro.  Has pacemaker, also with atrial fibrillation chronically on Eliquis.  Preop cardiology evaluation.     Renal failure:  Creatinine appears to be worsening from most recent in system.  Lemons catheter was placed due to concern for possible retention and impaired mobility.  Nephrology consulted.    DVT Prophylaxis: SCDs  Code Status: DNR  Diet: Pending speech eval  Discharge Plan: LUCRECIA Packer, DO  12/27/23  15:46 EST    Dictated utilizing Dragon dictation.

## 2023-12-27 NOTE — PROGRESS NOTES
Pt w/ severe malnutrition based on NFPE findings. Pt w/ mild wt loss of  4% since September. Pt edentulous at time of visit and hard to understand but agreeable to nutrition supplement. RD to follow-up.    Malnutrition Severity Assessment      Patient meets criteria for : Severe Malnutrition  Malnutrition Type (last 8 hours)       Malnutrition Severity Assessment       Row Name 12/27/23 1422       Malnutrition Severity Assessment    Malnutrition Type Chronic Disease - Related Malnutrition      Row Name 12/27/23 1422       Unintentional Weight Loss     Unintentional Weight Loss  --  Wt loss of 4% in 3 months      Row Name 12/27/23 1422       Muscle Loss    Protestant Region Severe - deep hollowing/scooping, lack of muscle to touch, facial bones well defined    Clavicle Bone Region Severe - protruding prominent bone    Acromion Bone Region Severe - squared shoulders, bones, and acromion process protrusion prominent    Scapular Bone Region Severe - prominent bones, depressions easily visible between ribs, scapula, spine, shoulders    Dorsal Hand Region Moderate - slight depression    Patellar Region Moderate - patella more prominent, less muscle definition around patella    Anterior Thigh Region Moderate - mild depression on inner thigh    Posterior Calf Region Moderate - some roundness, slight firmness      Row Name 12/27/23 1422       Fat Loss    Orbital Region  Severe - pronounced hollowness/depression, dark circles, loose saggy skin    Upper Arm Region Severe - mostly skin, very little space between folds, fingers touch    Thoracic & Lumbar Region --  ASMITA      Row Name 12/27/23 1422       Criteria Met (Must meet criteria for severity in at least 2 of these categories: M Wasting, Fat Loss, Fluid, Secondary Signs, Wt. Status, Intake)    Patient meets criteria for  Severe Malnutrition

## 2023-12-27 NOTE — CONSULTS
Patient Care Team:  Sri Bangura MD as PCP - General    Chief complaint right hip pain    Subjective     Patient is a 89 y.o. male presents with right hip pain.  From a ground-level fall.  Complains of pain only in the right hip.        Review of Systems   Pertinent items are noted in HPI    History  Past Medical History:   Diagnosis Date    Anemia     Arthritis     Asthma     Blood clotting disorder     COPD (chronic obstructive pulmonary disease)     H/O emphysema     Hiatal hernia     History of blood transfusion     Hyperlipidemia     Hypertension     Kidney infection     Kidney stones     Positive TB test     Thyroid disease      Past Surgical History:   Procedure Laterality Date    ABDOMINAL AORTIC ANEURYSM REPAIR      CARDIAC ELECTROPHYSIOLOGY PROCEDURE N/A 2019    Procedure: Device Implant;  Surgeon: Dom Gallardo MD;  Location: [facility] CATH INVASIVE LOCATION;  Service: Cardiology    HERNIA REPAIR      KNEE SURGERY       Family History   Problem Relation Age of Onset    No Known Problems Mother     No Known Problems Father     Bone cancer Sister     Lung cancer Brother     Colon cancer Neg Hx      Social History     Tobacco Use    Smoking status: Former     Packs/day: 1.50     Years: 30.00     Additional pack years: 0.00     Total pack years: 45.00     Types: Cigarettes     Quit date: 3/5/1998     Years since quittin.8     Passive exposure: Past    Smokeless tobacco: Never   Vaping Use    Vaping Use: Never used   Substance Use Topics    Alcohol use: No    Drug use: No     Medications Prior to Admission   Medication Sig Dispense Refill Last Dose    amLODIPine (NORVASC) 5 MG tablet Take 1 tablet by mouth Daily.       apixaban (ELIQUIS) 2.5 MG tablet tablet Take 1 tablet by mouth Every 12 (Twelve) Hours.       aspirin 81 MG EC tablet Take 1 tablet by mouth Daily.       Cyanocobalamin (VITAMIN B-12 PO) Take  by mouth.       doxazosin (CARDURA) 4 MG tablet Take 1 tablet by mouth Every  Night. 90 tablet 3     ferrous sulfate 325 (65 FE) MG tablet Take 1 tablet by mouth Daily With Breakfast.       finasteride (PROSCAR) 5 MG tablet Take 1 tablet by mouth Daily. 90 tablet 3     irbesartan (AVAPRO) 150 MG tablet irbesartan 150 mg tablet   TAKE 1 TABLET EVERY DAY       irbesartan (AVAPRO) 300 MG tablet Take 1 tablet by mouth Every Night.       levothyroxine (SYNTHROID, LEVOTHROID) 100 MCG tablet levothyroxine 100 mcg tablet   TAKE 1 TABLET EVERY DAY       montelukast (SINGULAIR) 10 MG tablet montelukast 10 mg tablet       Multiple Vitamins-Minerals (PRESERVISION AREDS 2 PO) Take  by mouth.       omeprazole (priLOSEC) 20 MG capsule Take 1 capsule by mouth Daily.       ondansetron (ZOFRAN) 4 MG tablet Take 1 tablet by mouth Every 8 (Eight) Hours As Needed for Nausea or Vomiting. 30 tablet 1     Polyethylene Glycol powder Take 1 cap full (17 grams) in 8 oz of noncarbonated beverage and drink once daily 500 g 3     simvastatin (ZOCOR) 40 MG tablet simvastatin 40 mg tablet       tamsulosin (FLOMAX) 0.4 MG capsule 24 hr capsule tamsulosin 0.4 mg capsule       tiotropium (SPIRIVA) 18 MCG per inhalation capsule Spiriva with HandiHaler 18 mcg and inhalation capsules        Allergies:  Atorvastatin    Objective     Vital Signs  Temp:  [97.2 °F (36.2 °C)-98.8 °F (37.1 °C)] 98.5 °F (36.9 °C)  Heart Rate:  [72-98] 93  Resp:  [16-20] 16  BP: ()/(49-83) 128/71    Physical Exam:      General Appearance:  Alert, cooperative, in no acute distress   Head:  Normocephalic, without obvious abnormality, atraumatic   Eyes:  Lids and lashes normal, conjunctivae and sclerae normal, no icterus, no pallor, corneas clear, PERRLA   Ears:  Ears appear intact with no abnormalities noted   Throat:  No oral lesions, no thrush, oral mucosa moist   Neck:  No adenopathy, supple, trachea midline, no thyromegaly, no carotid bruit, no JVD   Back:  No kyphosis present, no scoliosis present, no skin lesions, erythema or scars, no  tenderness to percussion or palpation, range of motion normal   Lungs:  Clear to auscultation, respirations regular, even and unlabored    Heart:  Regular rhythm and normal rate, normal S1 and S2, no murmur, no gallop, no rub, no click   Chest Wall:  No abnormalities observed   Abdomen:  Normal bowel sounds, no masses, no organomegaly, soft non-tender, non-distended, no guarding, no rebound tenderness   Rectal:  Deferred   Extremities:  Moves all extremities well, no edema, no cyanosis, no redness   Pulses:  Pulses palpable and equal bilaterally   Skin:  No bleeding, bruising or rash   Lymph nodes:  No palpable adenopathy   Neurologic:  Cranial nerves 2 - 12 grossly intact, sensation intact, DTR present and equal bilaterally                                                                               Results Review:    I reviewed the patient's new clinical results.    Assessment & Plan       Acute hypoxic respiratory failure      Right hip displaced femoral neck fracture.    Plan would be for a right hip hemiarthroplasty pending medical optimization.  If patient decides for surgical intervention.  Will discuss with the patient and the power of  sister further for for definitive plans.  Will keep n.p.o. at midnight in case we decide for surgical intervention.    I had discussion with the patient's sister-in-law power of  who provides some care for him he is a household ambulator with a walker.  He lives by himself we did discuss the risk and benefits of surgical versus nonoperative treatment including pneumonia risk with anesthesia stroke heart attack death infection and we will plan for surgical intervention pending medical optimization    I discussed the patients findings and my recommendations with patient.     Diego Oliveros MD  12/27/23  08:13 EST

## 2023-12-27 NOTE — PAYOR COMM NOTE
"TO:HUMANA  FROM:JEVON WHITLEY RN PHONE [phone]  IN CLINICALS REF# [id number]    Catia Brown (89 y.o. Male)       Date of Birth   02/13/1933    Social Security Number       Address   [address] [id number]    Home Phone   [phone]    MRN   [id number]       Scientologist   Muslim    Marital Status                               Admission Date   12/26/23    Admission Type   Emergency    Admitting Provider   Leslie Packer DO    Attending Provider   Leslie Packer DO    Department, Room/Bed   [facility] INTENSIVE CARE, I04/1       Discharge Date       Discharge Disposition       Discharge Destination                                 Attending Provider: Leslie Packer DO    Allergies: Atorvastatin    Isolation: None   Infection: None   Code Status: No CPR    Ht: 180.3 cm (71\")   Wt: 68.4 kg (150 lb 12.7 oz)    Admission Cmt: None   Principal Problem: Acute hypoxic respiratory failure [J96.01]                   Active Insurance as of 12/26/2023       Primary Coverage       Payor Plan Insurance Group Employer/Plan Group    HUMANA MEDICARE REPLACEMENT HUMANA MEDICARE REPLACEMENT [id number]       Payor Plan Address Payor Plan Phone Number Payor Plan Fax Number Effective Dates    [address] [address] [phone]  1/1/2019 - None Entered    [facility] [address]         Subscriber Name Subscriber Birth Date Member ID       CATIA BROWN 2/13/1933 [id number]               Secondary Coverage       Payor Plan Insurance Group Employer/Plan Group    [place]        Payor Plan Address Payor Plan Phone Number Payor Plan Fax Number Effective Dates    [address] [phone]  7/7/2016 - None Entered    [facility] [id number]         Subscriber Name Subscriber Birth Date Member ID       CATIA BROWN 2/13/1933 [id number]                     Emergency Contacts        (Rel.) Home Phone Work " "Phone Mobile Phone    joan morrow (Sister) -- -- [phone]    Kaci Montanez (Daughter) [phone] -- [phone]                 History & Physical        Leslie Packer, DO at 23 1709            [facility]   HISTORY AND PHYSICAL      Name:  Jonathan Brown   Age:  89 y.o.  Sex:  male  :  1933  MRN:  [id number]   Visit Number:  [id number]  Admission Date:  2023  Date Of Service:  23  Primary Care Physician:  Sri Bangura MD    Chief Complaint:     Fall, hip pain    History Of Presenting Illness:      The patient is a chronically ill 89-year-old with medical history of chronic kidney disease stage IV, hypertension, hyperlipidemia, COPD with chronic respiratory failure, hypothyroidism, sick sinus syndrome status post pacemaker, paroxysmal atrial fibrillation, impaired mobility and ADLs, hearing loss, who had presented from home after sustaining a fall.  History obtained from ER record, patient at bedside.  No family available.  He did note he had been \"sick\" for a few days, somewhat short of breath with a cough.  Notes that earlier this morning while ambulating he had felt lightheaded, subsequently fell and landed on his right hip and lower leg.  Unable to ambulate thereafter.  He notes he does have COPD, has not smoked in many years.  Does use inhalers at home.  Notes sister helps him some, is his POA, also has a daughter.    In the ER, he was normotensive, heart rate in the 80s, afebrile.  Saturating 95% on 4 L of oxygen.  White count 10 hemoglobin 8.7 platelets 128.  Procalcitonin 0.07 proBNP 1600 high-sensitivity troponin of 80.  Creatinine 2.68 potassium 5 negative flu COVID test.  ABG pH 7.409 pCO2 38 pO2 58.  Urinalysis with small leukocyte esterase and 2+ bacteria.  CT cervical spine without contrast unremarkable.  CT scan of the head without contrast unremarkable.  3 view right knee x-ray unremarkable.  Bilateral hip x-rays with " evidence of a subcapital right femoral neck fracture with displacement noted.  Chest x-ray with chronic findings.  The patient was given pain control, bronchodilators, steroids.  Dr. Oliveros with orthopedics to see in consult.  We were asked admit thereafter.    Review Of Systems:    All systems were reviewed and negative except as mentioned in history of presenting illness, assessment and plan.    Past Medical History: Patient  has a past medical history of Anemia, Arthritis, Asthma, Blood clotting disorder, COPD (chronic obstructive pulmonary disease), H/O emphysema, Hiatal hernia, History of blood transfusion, Hyperlipidemia, Hypertension, Kidney infection, Kidney stones, Positive TB test, and Thyroid disease.    Past Surgical History: Patient  has a past surgical history that includes Hernia repair; Knee surgery; AAA repair, open; and Cardiac electrophysiology procedure (N/A, 4/8/2019).    Social History: Patient  reports that he quit smoking about 25 years ago. His smoking use included cigarettes. He has a 45.00 pack-year smoking history. He has been exposed to tobacco smoke. He has never used smokeless tobacco. He reports that he does not drink alcohol and does not use drugs.    Family History:  Patient's family history has been reviewed and found to be noncontributory.     Allergies:      Atorvastatin    Home Medications:    Prior to Admission Medications       Prescriptions Last Dose Informant Patient Reported? Taking?    amLODIPine (NORVASC) 5 MG tablet   Yes No    Take 1 tablet by mouth Daily.    apixaban (ELIQUIS) 2.5 MG tablet tablet   Yes No    Take 1 tablet by mouth Every 12 (Twelve) Hours.    aspirin 81 MG EC tablet   Yes No    Take 1 tablet by mouth Daily.    Cyanocobalamin (VITAMIN B-12 PO)   Yes No    Take  by mouth.    doxazosin (CARDURA) 4 MG tablet   No No    Take 1 tablet by mouth Every Night.    ferrous sulfate 325 (65 FE) MG tablet   Yes No    Take 1 tablet by mouth Daily With Breakfast.     finasteride (PROSCAR) 5 MG tablet   No No    Take 1 tablet by mouth Daily.    irbesartan (AVAPRO) 150 MG tablet   Yes No    irbesartan 150 mg tablet   TAKE 1 TABLET EVERY DAY    irbesartan (AVAPRO) 300 MG tablet   Yes No    Take 1 tablet by mouth Every Night.    levothyroxine (SYNTHROID, LEVOTHROID) 100 MCG tablet   Yes No    levothyroxine 100 mcg tablet   TAKE 1 TABLET EVERY DAY    montelukast (SINGULAIR) 10 MG tablet   Yes No    montelukast 10 mg tablet    Multiple Vitamins-Minerals (PRESERVISION AREDS 2 PO)   Yes No    Take  by mouth.    omeprazole (priLOSEC) 20 MG capsule   Yes No    Take 1 capsule by mouth Daily.    ondansetron (ZOFRAN) 4 MG tablet   No No    Take 1 tablet by mouth Every 8 (Eight) Hours As Needed for Nausea or Vomiting.    Polyethylene Glycol powder   No No    Take 1 cap full (17 grams) in 8 oz of noncarbonated beverage and drink once daily    simvastatin (ZOCOR) 40 MG tablet   Yes No    simvastatin 40 mg tablet    tamsulosin (FLOMAX) 0.4 MG capsule 24 hr capsule   Yes No    tamsulosin 0.4 mg capsule    tiotropium (SPIRIVA) 18 MCG per inhalation capsule   Yes No    Spiriva with HandiHaler 18 mcg and inhalation capsules          ED Medications:    Medications   sodium chloride 0.9 % flush 10 mL (has no administration in time range)   Morphine sulfate (PF) injection 2 mg (2 mg Intravenous Given 12/26/23 1304)   ondansetron (ZOFRAN) injection 4 mg (4 mg Intravenous Given 12/26/23 1303)   methylPREDNISolone sodium succinate (SOLU-Medrol) injection 125 mg (125 mg Intravenous Given 12/26/23 1435)   ipratropium-albuterol (DUO-NEB) nebulizer solution 3 mL (3 mL Nebulization Given 12/26/23 1439)   magnesium sulfate 2g/50 mL (PREMIX) infusion (0 g Intravenous Stopped 12/26/23 1529)   morphine injection 4 mg (4 mg Intravenous Given 12/26/23 1438)     Vital Signs:  Temp:  [98.2 °F (36.8 °C)] 98.2 °F (36.8 °C)  Heart Rate:  [77-98] 89  Resp:  [18] 18  BP: ()/(55-83) 109/63        12/26/23  5570  "  Weight: 64.4 kg (142 lb)     Body mass index is 19.8 kg/m².    Physical Exam:     Most recent vital Signs: /63   Pulse 89   Temp 98.2 °F (36.8 °C) (Oral)   Resp 18   Ht 180.3 cm (71\")   Wt 64.4 kg (142 lb)   SpO2 92%   BMI 19.80 kg/m²     Physical Exam  Constitutional:       Appearance: He is ill-appearing.   HENT:      Head: Normocephalic.      Ears:      Comments: Hard of hearing  Eyes:      Extraocular Movements: Extraocular movements intact.      Pupils: Pupils are equal, round, and reactive to light.   Cardiovascular:      Rate and Rhythm: Normal rate and regular rhythm.      Pulses: Normal pulses.      Heart sounds: No murmur heard.  Pulmonary:      Breath sounds: No stridor. Wheezing present. No rhonchi.   Abdominal:      General: Abdomen is flat. Bowel sounds are normal. There is no distension.      Tenderness: There is no abdominal tenderness. There is no guarding.   Musculoskeletal:         General: Deformity (Right leg externally rotated tender) present.      Right lower leg: No edema.      Left lower leg: No edema.   Skin:     General: Skin is warm.      Capillary Refill: Capillary refill takes less than 2 seconds.      Findings: No bruising or lesion.   Neurological:      General: No focal deficit present.      Mental Status: He is alert. Mental status is at baseline.      Motor: Weakness present.   Psychiatric:         Mood and Affect: Mood normal.         Thought Content: Thought content normal.         Laboratory data:    I have reviewed the labs done in the emergency room.    Results from last 7 days   Lab Units 12/26/23  1216   SODIUM mmol/L 139   POTASSIUM mmol/L 5.0   CHLORIDE mmol/L 101   CO2 mmol/L 24.8   BUN mg/dL 44*   CREATININE mg/dL 2.68*   CALCIUM mg/dL 9.5   BILIRUBIN mg/dL 0.7   ALK PHOS U/L 103   ALT (SGPT) U/L 11   AST (SGOT) U/L 19   GLUCOSE mg/dL 127*     Results from last 7 days   Lab Units 12/26/23  1216   WBC 10*3/mm3 10.49   HEMOGLOBIN g/dL 8.7*   HEMATOCRIT % " 27.9*   PLATELETS 10*3/mm3 128*         Results from last 7 days   Lab Units 12/26/23  1404 12/26/23  1216   HSTROP T ng/L 77* 80*     Results from last 7 days   Lab Units 12/26/23  1216   PROBNP pg/mL 1,662.0             Results from last 7 days   Lab Units 12/26/23  1327   PH, ARTERIAL pH units 7.409   PO2 ART mm Hg 58.9*   PCO2, ARTERIAL mm Hg 38.7   HCO3 ART mmol/L 24.4     Results from last 7 days   Lab Units 12/26/23  1350   COLOR UA  Yellow   GLUCOSE UA  Negative   KETONES UA  Negative   BLOOD UA  Negative   LEUKOCYTES UA  Small (1+)*   PH, URINE  6.0   BILIRUBIN UA  Negative   UROBILINOGEN UA  0.2 E.U./dL   RBC UA /HPF None Seen   WBC UA /HPF 6-10*       Pain Management Panel  More data may exist         Latest Ref Rng & Units 10/19/2021 10/9/2015   Pain Management Panel   Creatinine, Urine mg/dL 74.5  86.9        EKG:      Atrial paced rhythm    Radiology:    CT Cervical Spine Without Contrast    Result Date: 12/26/2023  PROCEDURE: CT CERVICAL SPINE WO CONTRAST-  HISTORY: Neck trauma (Age >= 65y), neck pain  TECHNIQUE: Thin section axial CT with sagittal and coronal reconstructions  FINDINGS: No fracture is present. Alignment is normal.  No bony canal stenosis is seen. Mild diffuse degenerative disc disease is present. Mild facet arthropathy is noted.      Negative CT evaluation of the cervical spine for acute bony injury.    This study was performed with techniques to keep radiation doses as low as reasonably achievable (ALARA). Individualized dose reduction techniques using automated exposure control or adjustment of vA and/or kV according to the patient size were employed.  This report was signed and finalized on 12/26/2023 2:34 PM by Niels Sierra MD.      CT Head Without Contrast    Result Date: 12/26/2023  PROCEDURE: CT HEAD WO CONTRAST-  HISTORY: Head trauma, minor (Age >= 65y), pain  COMPARISON: 7/15/2022  TECHNIQUE: Noncontrast exam  FINDINGS: Advanced atrophy and chronic ischemic white matter  changes are noted.  No cortical edema is present. There is no mass or hemorrhage. Ventricles are normal.  Bone windows show no skull fracture or obvious destructive lesion.      1. No acute intracranial abnormality or obvious mass. 2. Atrophy and chronic ischemic white matter changes as above.   This study was performed with techniques to keep radiation doses as low as reasonably achievable (ALARA). Individualized dose reduction techniques using automated exposure control or adjustment of vA and/or kV according to the patient size were employed.    This report was signed and finalized on 12/26/2023 2:31 PM by Niels Sierra MD.      XR Hips Bilateral With or Without Pelvis 3-4 View    Result Date: 12/26/2023  PROCEDURE: XR HIPS BILATERAL W OR WO PELVIS 3-4 VIEW-  THREE VIEW  HISTORY: trauma  FINDINGS:  Three views show fracture of the subcapital right femoral neck.  There is moderate displacement. Left proximal femur is intact.  The joint spaces appear normal.       Subcapital right femoral neck fracture as above.    This report was signed and finalized on 12/26/2023 2:19 PM by Niels Sierra MD.      XR Knee 3 View Right    Result Date: 12/26/2023  PROCEDURE: XR KNEE 3 VW RIGHT-  THREE VIEW  HISTORY: trauma, pain  FINDINGS:  Three views show no evidence of an acute, displaced fracture or dislocation of the visualized bony architecture.  The joint spaces appear normal. Generalized osteopenia is present.      Unremarkable exam.     This report was signed and finalized on 12/26/2023 2:18 PM by Niels Sierra MD.      XR Chest 1 View    Result Date: 12/26/2023  Chest single view  COMPARISON: Chest from 4-8-2019  FINDINGS: Cardiac silhouette is of normal size. Left subclavian pacemaker is present.  Mild linear density is identified in the perihilar regions bilaterally, consistent with atelectasis. No localized airspace infiltrates are seen.      1. Chronic changes and mild perihilar atelectasis, as described. 2. No  evidence of acute infiltrate.  This report was signed and finalized on 12/26/2023 1:33 PM by Erlin Duncan MD.       Assessment:    Right femoral neck fracture, POA  Acute on chronic hypoxic respiratory failure, POA   COPD with exacerbation  History of sick sinus syndrome status post pacemaker  Atrial fibrillation on Eliquis  Hearing loss  Acute on chronic kidney disease stage IV    Plan:    Admit as inpatient    Hip fracture:  Dr. Oliveros with orthopedics to see in consultation.  After initial discussion, patient not too sure if he wants to proceed with surgery, discussed significant risk without surgery.  He is concerned about his underlying medical conditions and anesthesia.  He will need preop evaluation by cardiology and improvement in pulmonary status is much as possible.  Tentatively plan for surgery may be Thursday.  As needed pain control.    COPD:  Likely worsened due to fall, however had symptoms of exacerbation prior to.  Currently oxygen saturations are stable on 4 L.  Will continue with bronchodilators and prednisone.    Sick sinus syndrome:  Follows with Dr. Haro.  Has pacemaker, also with atrial fibrillation chronically on Eliquis.    Renal failure:  Creatinine appears to be worsening from most recent in system.  Will place Lemons catheter for any possible retention, impaired mobility currently as well.  History of BPH.  Will consult with nephrology.  Avoid nephrotoxic agents.    Patient otherwise meets inpatient level care with anticipated stay greater than 2 midnights.  Further recommendation be depend upon improvement of clinical condition.  Plan care discussed with the patient no family currently at bedside.  He did note that he is a DNI/DNR.    Risk Assessment: High  DVT Prophylaxis: SCDs  Code Status: DNR  Diet: As tolerated, has dentures    Advance Care Planning  ACP discussion was held with the patient during this visit. Patient has an advance directive in EMR which is still valid.             Leslie Packer DO  12/26/23  17:09 EST    Dictated utilizing Dragon dictation.    Electronically signed by Leslie Packer DO at 12/26/23 1813          Emergency Department Notes        Lalit Patel MD at 12/26/23 1256        Procedure Orders    1. Critical Care [[id number]] ordered by Lalit Patel MD                 Subjective:  History of Present Illness:    Patient is a 89-year-old male history of COPD on home oxygen at night, hypertension, hyperlipidemia, nephrolithiasis, hypothyroidism presents today after a fall.  Reports that he has been increasingly short of breath over the last 4 days.  Dyspneic this morning.  Attempted to walk to his house on his own, became very lightheaded and fell onto his right hip.  Did hit his head, denies losing consciousness.  Denies chest pain.  Has pain to his right hip, right knee.  Denies any abdominal pain nausea vomiting or diarrhea.  States that he had cough and congestion prior to arrival.      Nurses Notes reviewed and agree, including vitals, allergies, social history and prior medical history.     REVIEW OF SYSTEMS: All systems reviewed and not pertinent unless noted.  Review of Systems   Constitutional:  Positive for activity change. Negative for appetite change, chills, fatigue and fever.   HENT:  Positive for congestion. Negative for sinus pressure, sneezing and trouble swallowing.    Eyes:  Negative for discharge and itching.   Respiratory:  Positive for cough and shortness of breath.    Cardiovascular:  Negative for chest pain and palpitations.   Gastrointestinal:  Negative for abdominal distention, abdominal pain, diarrhea, nausea and vomiting.   Endocrine: Negative for cold intolerance and heat intolerance.   Genitourinary:  Negative for decreased urine volume, dysuria and urgency.   Musculoskeletal:  Negative for gait problem, neck pain and neck stiffness.   Skin:  Negative for color change and rash.   Allergic/Immunologic:  "Negative for immunocompromised state.   Neurological:  Negative for facial asymmetry and headaches.   Hematological:  Negative for adenopathy.   Psychiatric/Behavioral:  Negative for self-injury and suicidal ideas.        Past Medical History:   Diagnosis Date    Anemia     Arthritis     Asthma     Blood clotting disorder     COPD (chronic obstructive pulmonary disease)     H/O emphysema     Hiatal hernia     History of blood transfusion     Hyperlipidemia     Hypertension     Kidney infection     Kidney stones     Positive TB test     Thyroid disease        Allergies:    Atorvastatin      Past Surgical History:   Procedure Laterality Date    ABDOMINAL AORTIC ANEURYSM REPAIR      CARDIAC ELECTROPHYSIOLOGY PROCEDURE N/A 2019    Procedure: Device Implant;  Surgeon: Dom Gallardo MD;  Location: [facility] CATH INVASIVE LOC[facility];  Service: Cardiology    HERNIA REPAIR      KNEE SURGERY           Social History     Socioeconomic History    Marital status:     Number of children: 2   Tobacco Use    Smoking status: Former     Packs/day: 1.50     Years: 30.00     Additional pack years: 0.00     Total pack years: 45.00     Types: Cigarettes     Quit date: 3/5/1998     Years since quittin.8     Passive exposure: Past    Smokeless tobacco: Never   Vaping Use    Vaping Use: Never used   Substance and Sexual Activity    Alcohol use: No    Drug use: No    Sexual activity: Defer         Family History   Problem Relation Age of Onset    No Known Problems Mother     No Known Problems Father     Bone cancer Sister     Lung cancer Brother     Colon cancer Neg Hx        Objective  Physical Exam:  /63   Pulse 89   Temp 98.6 °F (37 °C) (Temporal)   Resp 18   Ht 180.3 cm (71\")   Wt 68.6 kg (151 lb 3.8 oz)   SpO2 92%   BMI 21.09 kg/m²      Physical Exam  Constitutional:       General: He is not in acute distress.     Appearance: Normal appearance. He is normal weight. He is not ill-appearing.   HENT:      " Head: Normocephalic and atraumatic.      Nose: Nose normal. Congestion and rhinorrhea present.      Mouth/Throat:      Mouth: Mucous membranes are moist.      Pharynx: Oropharynx is clear.   Eyes:      Extraocular Movements: Extraocular movements intact.      Conjunctiva/sclera: Conjunctivae normal.      Pupils: Pupils are equal, round, and reactive to light.   Cardiovascular:      Rate and Rhythm: Normal rate and regular rhythm.      Pulses: Normal pulses.   Pulmonary:      Effort: Pulmonary effort is normal. No respiratory distress.      Breath sounds: Normal breath sounds.   Abdominal:      General: Abdomen is flat. Bowel sounds are normal. There is no distension.      Palpations: Abdomen is soft.      Tenderness: There is no abdominal tenderness. There is no guarding or rebound.   Musculoskeletal:         General: Tenderness and signs of injury present. No swelling or deformity. Normal range of motion.      Cervical back: Normal range of motion and neck supple. No rigidity or tenderness.      Comments: Patient with pain and flex position to right knee, right hip, neurovascular intact   Skin:     General: Skin is warm and dry.      Capillary Refill: Capillary refill takes less than 2 seconds.   Neurological:      General: No focal deficit present.      Mental Status: He is alert and oriented to person, place, and time. Mental status is at baseline.      Cranial Nerves: No cranial nerve deficit.      Sensory: No sensory deficit.      Motor: No weakness.   Psychiatric:         Mood and Affect: Mood normal.         Behavior: Behavior normal.         Thought Content: Thought content normal.         Judgment: Judgment normal.         Critical Care    Performed by: Lalit Patel MD  Authorized by: Leslie Packer DO    Critical care provider statement:     Critical care time (minutes):  30    Critical care time was exclusive of:  Separately billable procedures and treating other patients    Critical care  was necessary to treat or prevent imminent or life-threatening deterioration of the following conditions:  Respiratory failure and trauma    Critical care was time spent personally by me on the following activities:  Blood draw for specimens, development of treatment plan with patient or surrogate, discussions with consultants, discussions with primary provider, evaluation of patient's response to treatment, examination of patient, obtaining history from patient or surrogate, ordering and performing treatments and interventions, ordering and review of laboratory studies, ordering and review of radiographic studies, pulse oximetry, re-evaluation of patient's condition and review of old charts    Care discussed with: admitting provider        ED Course:    ED Course as of 12/26/23 1831   Tue Dec 26, 2023   1303 EKG interpreted by me, normal sinus rhythm with no concerning ST changes noted, intermittent PVCs noted on the lead, rate of 82 [JE]      ED Course User Index  [JE] Lalit Patel MD       Lab Results (last 24 hours)       Procedure Component Value Units Date/Time    CBC & Differential [[id number]]  (Abnormal) Collected: 12/26/23 1216    Specimen: Blood Updated: 12/26/23 1223    Narrative:      The following orders were created for panel order CBC & Differential.  Procedure                               Abnormality         Status                     ---------                               -----------         ------                     CBC Auto Differential[[id number]]        Abnormal            Final result                 Please view results for these tests on the individual orders.    Comprehensive Metabolic Panel [[id number]]  (Abnormal) Collected: 12/26/23 1216    Specimen: Blood Updated: 12/26/23 1240     Glucose 127 mg/dL      BUN 44 mg/dL      Creatinine 2.68 mg/dL      Sodium 139 mmol/L      Potassium 5.0 mmol/L      Chloride 101 mmol/L      CO2 24.8 mmol/L      Calcium 9.5 mg/dL      Total Protein  8.1 g/dL      Albumin 4.4 g/dL      ALT (SGPT) 11 U/L      AST (SGOT) 19 U/L      Alkaline Phosphatase 103 U/L      Total Bilirubin 0.7 mg/dL      Globulin 3.7 gm/dL      A/G Ratio 1.2 g/dL      BUN/Creatinine Ratio 16.4     Anion Gap 13.2 mmol/L      eGFR 21.9 mL/min/1.73     Narrative:      GFR Normal >60  Chronic Kidney Disease <60  Kidney Failure <15    The GFR formula is only valid for adults with stable renal function between ages 18 and 70.    Single High Sensitivity Troponin T [[id number]]  (Abnormal) Collected: 12/26/23 1216    Specimen: Blood Updated: 12/26/23 1256     HS Troponin T 80 ng/L     Narrative:      High Sensitive Troponin T Reference Range:  <14.0 ng/L- Negative Female for AMI  <22.0 ng/L- Negative Male for AMI  >=14 - Abnormal Female indicating possible myocardial injury.  >=22 - Abnormal Male indicating possible myocardial injury.   Clinicians would have to utilize clinical acumen, EKG, Troponin, and serial changes to determine if it is an Acute Myocardial Infarction or myocardial injury due to an underlying chronic condition.         Magnesium [[id number]]  (Normal) Collected: 12/26/23 1216    Specimen: Blood Updated: 12/26/23 1240     Magnesium 2.4 mg/dL     CBC Auto Differential [[id number]]  (Abnormal) Collected: 12/26/23 1216    Specimen: Blood Updated: 12/26/23 1223     WBC 10.49 10*3/mm3      RBC 3.04 10*6/mm3      Hemoglobin 8.7 g/dL      Hematocrit 27.9 %      MCV 91.8 fL      MCH 28.6 pg      MCHC 31.2 g/dL      RDW 14.4 %      RDW-SD 48.6 fl      MPV 9.6 fL      Platelets 128 10*3/mm3      Neutrophil % 63.5 %      Lymphocyte % 9.8 %      Monocyte % 5.8 %      Eosinophil % 20.0 %      Basophil % 0.4 %      Immature Grans % 0.5 %      Neutrophils, Absolute 6.66 10*3/mm3      Lymphocytes, Absolute 1.03 10*3/mm3      Monocytes, Absolute 0.61 10*3/mm3      Eosinophils, Absolute 2.10 10*3/mm3      Basophils, Absolute 0.04 10*3/mm3      Immature Grans, Absolute 0.05 10*3/mm3      nRBC 0.0  "/100 WBC     C-reactive Protein [[id number]]  (Normal) Collected: 12/26/23 1216    Specimen: Blood Updated: 12/26/23 1502     C-Reactive Protein <0.30 mg/dL     Procalcitonin [[id number]]  (Normal) Collected: 12/26/23 1216    Specimen: Blood Updated: 12/26/23 1521     Procalcitonin 0.07 ng/mL     Narrative:      As a Marker for Sepsis (Non-Neonates):    1. <0.5 ng/mL represents a low risk of severe sepsis and/or septic shock.  2. >2 ng/mL represents a high risk of severe sepsis and/or septic shock.    As a Marker for Lower Respiratory Tract Infections that require antibiotic therapy:    PCT on Admission    Antibiotic Therapy       6-12 Hrs later    >0.5                Strongly Recommended  >0.25 - <0.5        Recommended   0.1 - 0.25          Discouraged              Remeasure/reassess PCT  <0.1                Strongly Discouraged     Remeasure/reassess PCT    As 28 day mortality risk marker: \"Change in Procalcitonin Result\" (>80% or <=80%) if Day 0 (or Day 1) and Day 4 values are available. Refer to http://www.[facility]-pct-calculator.com    Change in PCT <=80%  A decrease of PCT levels below or equal to 80% defines a positive change in PCT test result representing a higher risk for 28-day all-cause mortality of patients diagnosed with severe sepsis for septic shock.    Change in PCT >80%  A decrease of PCT levels of more than 80% defines a negative change in PCT result representing a lower risk for 28-day all-cause mortality of patients diagnosed with severe sepsis or septic shock.       BNP [[id number]]  (Normal) Collected: 12/26/23 1216    Specimen: Blood Updated: 12/26/23 1458     proBNP 1,662.0 pg/mL     Narrative:      This assay is used as an aid in the diagnosis of individuals suspected of having heart failure. It can be used as an aid in the diagnosis of acute decompensated heart failure (ADHF) in patients presenting with signs and symptoms of ADHF to the emergency department (ED). In addition, NT-proBNP of <300 " pg/mL indicates ADHF is not likely.    Age Range Result Interpretation  NT-proBNP Concentration (pg/mL:      <50             Positive            >450                   Gray                 300-450                    Negative             <300    50-75           Positive            >900                  Gray                300-900                  Negative            <300      >75             Positive            >1800                  Gray                300-1800                  Negative            <300    COVID-19 and FLU A/B PCR, 1 HR TAT - Swab, Nasopharynx [[id number]]  (Normal) Collected: 12/26/23 1217    Specimen: Swab from Nasopharynx Updated: 12/26/23 1254     COVID19 Not Detected     Influenza A PCR Not Detected     Influenza B PCR Not Detected    Narrative:      Fact sheet for providers: https://www.fda.gov/media/368242/download    Fact sheet for patients: https://www.fda.gov/media/487427/download    Test performed by PCR.    Blood Gas, Arterial With Co-Ox [[id number]]  (Abnormal) Collected: 12/26/23 1327    Specimen: Arterial Blood Updated: 12/26/23 1328     Site Right Radial     Domingo's Test N/A     pH, Arterial 7.409 pH units      pCO2, Arterial 38.7 mm Hg      pO2, Arterial 58.9 mm Hg      Comment: 84 Value below reference range        HCO3, Arterial 24.4 mmol/L      Base Excess, Arterial -0.2 mmol/L      Comment: 84 Value below reference range        O2 Saturation, Arterial 91.2 %      Comment: 84 Value below reference range        Hematocrit, Blood Gas 24.3 %      Comment: 84 Value below reference range        Oxyhemoglobin 89.2 %      Comment: 84 Value below reference range        Methemoglobin 0.50 %      Carboxyhemoglobin 1.7 %      A-a DO2 41.3 mmHg      Barometric Pressure for Blood Gas 732 mmHg      Modality Nasal Cannula     Flow Rate 4.0 lpm      Ventilator Mode NA     Collected by [id number]     Comment: Meter: [id number]     :  [id number]        pH, Temp Corrected --     pCO2,  Temperature Corrected --     pO2, Temperature Corrected --    Urinalysis With Microscopic If Indicated (No Culture) - Urine, Clean Catch [[id number]]  (Abnormal) Collected: 12/26/23 1350    Specimen: Urine, Clean Catch Updated: 12/26/23 1404     Color, UA Yellow     Appearance, UA Cloudy     pH, UA 6.0     Specific Gravity, UA 1.012     Glucose, UA Negative     Ketones, UA Negative     Bilirubin, UA Negative     Blood, UA Negative     Protein, UA Negative     Leuk Esterase, UA Small (1+)     Nitrite, UA Negative     Urobilinogen, UA 0.2 E.U./dL    Urinalysis, Microscopic Only - Urine, Clean Catch [[id number]]  (Abnormal) Collected: 12/26/23 1350    Specimen: Urine, Clean Catch Updated: 12/26/23 1426     RBC, UA None Seen /HPF      WBC, UA 6-10 /HPF      Bacteria, UA 2+ /HPF      Squamous Epithelial Cells, UA 3-6 /HPF      Comment: Some small clumps        Renal Epithelial Cells, UA 0-2 /HPF      Hyaline Casts, UA None Seen /LPF      Methodology Manual Light Microscopy    High Sensitivity Troponin T 2Hr [[id number]]  (Abnormal) Collected: 12/26/23 1404    Specimen: Blood Updated: 12/26/23 1502     HS Troponin T 77 ng/L      Troponin T Delta -3 ng/L     Narrative:      High Sensitive Troponin T Reference Range:  <14.0 ng/L- Negative Female for AMI  <22.0 ng/L- Negative Male for AMI  >=14 - Abnormal Female indicating possible myocardial injury.  >=22 - Abnormal Male indicating possible myocardial injury.   Clinicians would have to utilize clinical acumen, EKG, Troponin, and serial changes to determine if it is an Acute Myocardial Infarction or myocardial injury due to an underlying chronic condition.                  CT Cervical Spine Without Contrast    Result Date: 12/26/2023  PROCEDURE: CT CERVICAL SPINE WO CONTRAST-  HISTORY: Neck trauma (Age >= 65y), neck pain  TECHNIQUE: Thin section axial CT with sagittal and coronal reconstructions  FINDINGS: No fracture is present. Alignment is normal.  No bony canal stenosis is  seen. Mild diffuse degenerative disc disease is present. Mild facet arthropathy is noted.      Impression: Negative CT evaluation of the cervical spine for acute bony injury.    This study was performed with techniques to keep radiation doses as low as reasonably achievable (ALARA). Individualized dose reduction techniques using automated exposure control or adjustment of vA and/or kV according to the patient size were employed.  This report was signed and finalized on 12/26/2023 2:34 PM by Niels Sierra MD.      CT Head Without Contrast    Result Date: 12/26/2023  PROCEDURE: CT HEAD WO CONTRAST-  HISTORY: Head trauma, minor (Age >= 65y), pain  COMPARISON: 7/15/2022  TECHNIQUE: Noncontrast exam  FINDINGS: Advanced atrophy and chronic ischemic white matter changes are noted.  No cortical edema is present. There is no mass or hemorrhage. Ventricles are normal.  Bone windows show no skull fracture or obvious destructive lesion.      Impression: 1. No acute intracranial abnormality or obvious mass. 2. Atrophy and chronic ischemic white matter changes as above.   This study was performed with techniques to keep radiation doses as low as reasonably achievable (ALARA). Individualized dose reduction techniques using automated exposure control or adjustment of vA and/or kV according to the patient size were employed.    This report was signed and finalized on 12/26/2023 2:31 PM by Niels Sierra MD.      XR Hips Bilateral With or Without Pelvis 3-4 View    Result Date: 12/26/2023  PROCEDURE: XR HIPS BILATERAL W OR WO PELVIS 3-4 VIEW-  THREE VIEW  HISTORY: trauma  FINDINGS:  Three views show fracture of the subcapital right femoral neck.  There is moderate displacement. Left proximal femur is intact.  The joint spaces appear normal.       Impression: Subcapital right femoral neck fracture as above.    This report was signed and finalized on 12/26/2023 2:19 PM by Niels Sierra MD.      XR Knee 3 View Right    Result Date:  12/26/2023  PROCEDURE: XR KNEE 3 VW RIGHT-  THREE VIEW  HISTORY: trauma, pain  FINDINGS:  Three views show no evidence of an acute, displaced fracture or dislocation of the visualized bony architecture.  The joint spaces appear normal. Generalized osteopenia is present.      Impression: Unremarkable exam.     This report was signed and finalized on 12/26/2023 2:18 PM by Niels Sierra MD.      XR Chest 1 View    Result Date: 12/26/2023  Chest single view  COMPARISON: Chest from 4-8-2019  FINDINGS: Cardiac silhouette is of normal size. Left subclavian pacemaker is present.  Mild linear density is identified in the perihilar regions bilaterally, consistent with atelectasis. No localized airspace infiltrates are seen.      Impression: 1. Chronic changes and mild perihilar atelectasis, as described. 2. No evidence of acute infiltrate.  This report was signed and finalized on 12/26/2023 1:33 PM by Erlin Duncan MD.          MDM     Amount and/or Complexity of Data Reviewed  Decide to obtain previous medical records or to obtain history from someone other than the patient: yes        Initial impression of presenting illness: Shortness of breath, fall, hip pain    DDX: includes but is not limited to: Intracranial hemorrhage, C-spine fracture, hip fracture, tibial fracture, COPD exacerbation, bacterial pneumonia, PE    Patient arrives guarded with vitals interpreted by myself.     Pertinent features from physical exam: Patient requiring 6 L nasal cannula on exam.    Initial diagnostic plan: CBC, CMP, troponin, BNP, CRP, Pro-Chuck, ECG, chest x-ray, CT head, C-spine, plain film of right hip, plain film of right knee    Results from initial plan were reviewed and interpreted by me revealing no concerns for bacterial pneumonia, patient with increased work of breathing consistent with COPD exacerbation, patient with femoral neck fracture to the right, no other trauma noted    Diagnostic information from other sources: Discussed  No with family at bedside and reviewed past medical records    Interventions / Re-evaluation: Given morphine for pain control, given concerns for COPD exacerbation given Solu-Medrol, DuoNeb, magnesium with no improvement hypoxia    Results/clinical rationale were discussed with patient and family at bedside    Consultations/Discussion of results with other physicians: Given my concern for hip fracture discussed with orthopedic surgery who will consult on the patient.  Discussed with hospitalist given concerns for hip fracture and COPD exacerbation and admitted to their service further management    Disposition plan: Admit  -----        Final diagnoses:   COPD exacerbation   Closed fracture of neck of right femur, initial encounter          Lalit Patel MD  12/26/23 1831      Electronically signed by Lalit Patel MD at 12/26/23 1831       Garret Easley PCT at 12/26/23 1247       Summary:LAB                 Transferred lab to RN to report a critical result.     Electronically signed by Garret Easley, PCT at 12/26/23 1248       Vital Signs (last day)       Date/Time Temp Temp src Pulse Resp BP Patient Position SpO2    12/27/23 0900 -- -- 81 -- -- -- 99    12/27/23 0800 -- -- 94 12 121/75 Lying 100    12/27/23 0700 98.5 (36.9) Oral -- -- -- -- --    12/27/23 0644 -- -- -- 16 -- -- --    12/27/23 0400 97.2 (36.2) Axillary 93 16 128/71 Lying 95    12/27/23 0019 98.8 (37.1) Axillary -- -- -- -- --    12/27/23 0006 -- -- 98 20 105/63 Lying 96    12/27/23 0000 -- -- 72 -- 83/49 -- 96    12/26/23 2200 -- -- 84 -- 128/69 -- 99    12/26/23 2112 -- -- 92 18 -- -- --    12/26/23 2000 -- -- 92 20 134/77 Lying 94    12/26/23 1900 97.8 (36.6) Axillary -- -- -- -- --    12/26/23 1800 -- -- 96 17 135/74 Lying 89    12/26/23 1705 98.6 (37) Temporal -- -- -- -- --    12/26/23 1645 -- -- 89 -- 109/63 -- 92    12/26/23 1630 -- -- 94 -- 113/63 -- 92    12/26/23 1629 -- -- 91 -- -- -- 92    12/26/23 1615 -- -- 98  -- 111/60 -- --    12/26/23 1600 -- -- 92 -- 107/55 -- 95    12/26/23 1545 -- -- 91 -- 108/58 -- 95    12/26/23 1530 -- -- 92 -- 96/65 -- 94    12/26/23 1515 -- -- 89 -- 91/62 -- 93    12/26/23 1445 -- -- 89 -- 112/64 -- 100    12/26/23 1402 -- -- 84 -- 114/63 -- 94    12/26/23 1355 -- -- 89 -- -- -- 95    12/26/23 1345 -- -- 95 -- 136/83 -- 95    12/26/23 1335 -- -- 77 -- -- -- 93    12/26/23 1330 -- -- 77 -- 135/67 -- 97    12/26/23 1325 -- -- 80 -- -- -- 94    12/26/23 1315 -- -- 87 -- 132/61 -- 93    12/26/23 1300 -- -- 80 -- 135/79 -- 95    12/26/23 1213 98.2 (36.8) Oral 86 18 152/82 Sitting 98          Current Facility-Administered Medications   Medication Dose Route Frequency Provider Last Rate Last Admin    acetaminophen (TYLENOL) tablet 650 mg  650 mg Oral Q4H PRN Leslie Packer DO        Or    acetaminophen (TYLENOL) 160 MG/5ML oral solution 650 mg  650 mg Oral Q4H PRN Leslie Packer DO        Or    acetaminophen (TYLENOL) suppository 650 mg  650 mg Rectal Q4H PRN Leslie Packer DO        aluminum-magnesium hydroxide-simethicone (MAALOX MAX) 400-400-40 MG/5ML suspension 15 mL  15 mL Oral Q6H PRN Leslie Packer DO        sennosides-docusate (PERICOLACE) 8.6-50 MG per tablet 2 tablet  2 tablet Oral BID Leslie Packer DO   2 tablet at 12/27/23 0945    And    polyethylene glycol (MIRALAX) packet 17 g  17 g Oral Daily PRN Leslie Packer DO        And    bisacodyl (DULCOLAX) EC tablet 5 mg  5 mg Oral Daily PRN Leslie Packer DO        And    bisacodyl (DULCOLAX) suppository 10 mg  10 mg Rectal Daily PRN Leslie Packer DO        budesonide-formoterol (SYMBICORT) 160-4.5 MCG/ACT inhaler 2 puff  2 puff Inhalation BID - RT Leslie aPcker DO   2 puff at 12/27/23 0644    ceFAZolin Sodium-Dextrose (ANCEF) IVPB (duplex) 2 g  2 g Intravenous Once Diego Oliveros MD        cefTRIAXone (ROCEPHIN) IVPB 1000 mg/50ml dextrose (premix)  1,000 mg  Intravenous Q24H Leslie Packer,  mL/hr at 12/27/23 0957 1,000 mg at 12/27/23 0957    clindamycin (CLEOCIN) 900 mg in dextrose 5% 50 mL IVPB (premix)  900 mg Intravenous Once Diego Oliveros MD        ethyl alcohol 62 % 2 each  2 swab  Nasal Once Diego Oliveros MD        HYDROcodone-acetaminophen (NORCO) 5-325 MG per tablet 1 tablet  1 tablet Oral Q4H PRN Leslie Packer, DO   1 tablet at 12/27/23 0945    ipratropium-albuterol (DUO-NEB) nebulizer solution 3 mL  3 mL Nebulization Q4H PRN Leslie Packer, DO        levothyroxine (SYNTHROID, LEVOTHROID) tablet 100 mcg  100 mcg Oral Q AM Leslie Packer, DO   100 mcg at 12/27/23 0637    melatonin tablet 5 mg  5 mg Oral Nightly PRN Leslie Packer, DO        montelukast (SINGULAIR) tablet 10 mg  10 mg Oral Nightly Leslie Packer, DO   10 mg at 12/26/23 2215    nitroglycerin (NITROSTAT) SL tablet 0.4 mg  0.4 mg Sublingual Q5 Min PRN Leslie Packer, DO        ondansetron ODT (ZOFRAN-ODT) disintegrating tablet 4 mg  4 mg Oral Q6H PRN Leslie Packer, DO        Or    ondansetron (ZOFRAN) injection 4 mg  4 mg Intravenous Q6H PRN Leslie Packer, DO        predniSONE (DELTASONE) tablet 40 mg  40 mg Oral Daily With Breakfast Leslie Packer, DO   40 mg at 12/27/23 0945    sodium chloride 0.9 % flush 10 mL  10 mL Intravenous PRN Leslie Packer, DO        sodium chloride 0.9 % flush 10 mL  10 mL Intravenous Q12H Leslie Packer, DO   10 mL at 12/27/23 0946    sodium chloride 0.9 % flush 10 mL  10 mL Intravenous PRN Leslie Packer, DO        sodium chloride 0.9 % infusion 40 mL  40 mL Intravenous PRN Karrick, Leslie Perico, DO         Lab Results (last 24 hours)       Procedure Component Value Units Date/Time    Urine Culture - Urine, Urine, Clean Catch [[id number]] Collected: 12/26/23 1350    Specimen: Urine, Clean Catch Updated: 12/27/23 0811    Basic Metabolic Panel  "[[id number]]  (Abnormal) Collected: 12/27/23 0434    Specimen: Blood Updated: 12/27/23 0540     Glucose 162 mg/dL      BUN 48 mg/dL      Creatinine 2.77 mg/dL      Sodium 138 mmol/L      Potassium 4.9 mmol/L      Chloride 102 mmol/L      CO2 22.2 mmol/L      Calcium 9.2 mg/dL      BUN/Creatinine Ratio 17.3     Anion Gap 13.8 mmol/L      eGFR 21.1 mL/min/1.73     Narrative:      GFR Normal >60  Chronic Kidney Disease <60  Kidney Failure <15    The GFR formula is only valid for adults with stable renal function between ages 18 and 70.    CBC (No Diff) [[id number]]  (Abnormal) Collected: 12/27/23 0434    Specimen: Blood Updated: 12/27/23 0521     WBC 12.35 10*3/mm3      RBC 2.81 10*6/mm3      Hemoglobin 8.2 g/dL      Hematocrit 26.3 %      MCV 93.6 fL      MCH 29.2 pg      MCHC 31.2 g/dL      RDW 14.5 %      RDW-SD 50.0 fl      MPV 10.9 fL      Platelets 126 10*3/mm3     Procalcitonin [[id number]]  (Normal) Collected: 12/26/23 1216    Specimen: Blood Updated: 12/26/23 1521     Procalcitonin 0.07 ng/mL     Narrative:      As a Marker for Sepsis (Non-Neonates):    1. <0.5 ng/mL represents a low risk of severe sepsis and/or septic shock.  2. >2 ng/mL represents a high risk of severe sepsis and/or septic shock.    As a Marker for Lower Respiratory Tract Infections that require antibiotic therapy:    PCT on Admission    Antibiotic Therapy       6-12 Hrs later    >0.5                Strongly Recommended  >0.25 - <0.5        Recommended   0.1 - 0.25          Discouraged              Remeasure/reassess PCT  <0.1                Strongly Discouraged     Remeasure/reassess PCT    As 28 day mortality risk marker: \"Change in Procalcitonin Result\" (>80% or <=80%) if Day 0 (or Day 1) and Day 4 values are available. Refer to http://www.[employer]-pct-calculator.com    Change in PCT <=80%  A decrease of PCT levels below or equal to 80% defines a positive change in PCT test result representing a higher risk for 28-day all-cause mortality of " patients diagnosed with severe sepsis for septic shock.    Change in PCT >80%  A decrease of PCT levels of more than 80% defines a negative change in PCT result representing a lower risk for 28-day all-cause mortality of patients diagnosed with severe sepsis or septic shock.       High Sensitivity Troponin T 2Hr [[id number]]  (Abnormal) Collected: 12/26/23 1404    Specimen: Blood Updated: 12/26/23 1502     HS Troponin T 77 ng/L      Troponin T Delta -3 ng/L     Narrative:      High Sensitive Troponin T Reference Range:  <14.0 ng/L- Negative Female for AMI  <22.0 ng/L- Negative Male for AMI  >=14 - Abnormal Female indicating possible myocardial injury.  >=22 - Abnormal Male indicating possible myocardial injury.   Clinicians would have to utilize clinical acumen, EKG, Troponin, and serial changes to determine if it is an Acute Myocardial Infarction or myocardial injury due to an underlying chronic condition.         C-reactive Protein [[id number]]  (Normal) Collected: 12/26/23 1216    Specimen: Blood Updated: 12/26/23 1502     C-Reactive Protein <0.30 mg/dL     BNP [[id number]]  (Normal) Collected: 12/26/23 1216    Specimen: Blood Updated: 12/26/23 1458     proBNP 1,662.0 pg/mL     Narrative:      This assay is used as an aid in the diagnosis of individuals suspected of having heart failure. It can be used as an aid in the diagnosis of acute decompensated heart failure (ADHF) in patients presenting with signs and symptoms of ADHF to the emergency department (ED). In addition, NT-proBNP of <300 pg/mL indicates ADHF is not likely.    Age Range Result Interpretation  NT-proBNP Concentration (pg/mL:      <50             Positive            >450                   Gray                 300-450                    Negative             <300    50-75           Positive            >900                  Gray                300-900                  Negative            <300      >75             Positive            >1800                   Chris                300-1800                  Negative            <300    Urinalysis, Microscopic Only - Urine, Clean Catch [[id number]]  (Abnormal) Collected: 12/26/23 1350    Specimen: Urine, Clean Catch Updated: 12/26/23 1426     RBC, UA None Seen /HPF      WBC, UA 6-10 /HPF      Bacteria, UA 2+ /HPF      Squamous Epithelial Cells, UA 3-6 /HPF      Comment: Some small clumps        Renal Epithelial Cells, UA 0-2 /HPF      Hyaline Casts, UA None Seen /LPF      Methodology Manual Light Microscopy    Urinalysis With Microscopic If Indicated (No Culture) - Urine, Clean Catch [[id number]]  (Abnormal) Collected: 12/26/23 1350    Specimen: Urine, Clean Catch Updated: 12/26/23 1404     Color, UA Yellow     Appearance, UA Cloudy     pH, UA 6.0     Specific Gravity, UA 1.012     Glucose, UA Negative     Ketones, UA Negative     Bilirubin, UA Negative     Blood, UA Negative     Protein, UA Negative     Leuk Esterase, UA Small (1+)     Nitrite, UA Negative     Urobilinogen, UA 0.2 E.U./dL    [facility] Draw [[id number]] Collected: 12/26/23 1216    Specimen: Blood Updated: 12/26/23 1330    Narrative:      The following orders were created for panel order [facility].  Procedure                               Abnormality         Status                     ---------                               -----------         ------                     Green Top (Gel)[[id number]]                                  Final result               Lavender Top[[id number]]                                     Final result               Gold Top - SST[[id number]]                                   Final result               Light Blue Top[[id number]]                                   Final result                 Please view results for these tests on the individual orders.    Lavender Top [[id number]] Collected: 12/26/23 1216    Specimen: Blood Updated: 12/26/23 1330     Extra Tube hold for add-on     Comment: Auto resulted       Gold Top - SST [[id number]]  Collected: 12/26/23 1216    Specimen: Blood Updated: 12/26/23 1330     Extra Tube Hold for add-ons.     Comment: Auto resulted.       Green Top (Gel) [[id number]] Collected: 12/26/23 1216    Specimen: Blood Updated: 12/26/23 1330     Extra Tube Hold for add-ons.     Comment: Auto resulted.       Light Blue Top [[id number]] Collected: 12/26/23 1216    Specimen: Blood Updated: 12/26/23 1330     Extra Tube Hold for add-ons.     Comment: Auto resulted       Blood Gas, Arterial With Co-Ox [[id number]]  (Abnormal) Collected: 12/26/23 1327    Specimen: Arterial Blood Updated: 12/26/23 1328     Site Right Radial     Domingo's Test N/A     pH, Arterial 7.409 pH units      pCO2, Arterial 38.7 mm Hg      pO2, Arterial 58.9 mm Hg      Comment: 84 Value below reference range        HCO3, Arterial 24.4 mmol/L      Base Excess, Arterial -0.2 mmol/L      Comment: 84 Value below reference range        O2 Saturation, Arterial 91.2 %      Comment: 84 Value below reference range        Hematocrit, Blood Gas 24.3 %      Comment: 84 Value below reference range        Oxyhemoglobin 89.2 %      Comment: 84 Value below reference range        Methemoglobin 0.50 %      Carboxyhemoglobin 1.7 %      A-a DO2 41.3 mmHg      Barometric Pressure for Blood Gas 732 mmHg      Modality Nasal Cannula     Flow Rate 4.0 lpm      Ventilator Mode NA     Collected by [id number]     Comment: Meter: [id number]     :  [id number]        pH, Temp Corrected --     pCO2, Temperature Corrected --     pO2, Temperature Corrected --    Single High Sensitivity Troponin T [[id number]]  (Abnormal) Collected: 12/26/23 1216    Specimen: Blood Updated: 12/26/23 1256     HS Troponin T 80 ng/L     Narrative:      High Sensitive Troponin T Reference Range:  <14.0 ng/L- Negative Female for AMI  <22.0 ng/L- Negative Male for AMI  >=14 - Abnormal Female indicating possible myocardial injury.  >=22 - Abnormal Male indicating possible myocardial injury.   Clinicians would have to  utilize clinical acumen, EKG, Troponin, and serial changes to determine if it is an Acute Myocardial Infarction or myocardial injury due to an underlying chronic condition.         COVID-19 and FLU A/B PCR, 1 HR TAT - Swab, Nasopharynx [[id number]]  (Normal) Collected: 12/26/23 1217    Specimen: Swab from Nasopharynx Updated: 12/26/23 1254     COVID19 Not Detected     Influenza A PCR Not Detected     Influenza B PCR Not Detected    Narrative:      Fact sheet for providers: https://www.fda.gov/media/825110/download    Fact sheet for patients: https://www.fda.gov/media/207504/download    Test performed by PCR.    Comprehensive Metabolic Panel [[id number]]  (Abnormal) Collected: 12/26/23 1216    Specimen: Blood Updated: 12/26/23 1240     Glucose 127 mg/dL      BUN 44 mg/dL      Creatinine 2.68 mg/dL      Sodium 139 mmol/L      Potassium 5.0 mmol/L      Chloride 101 mmol/L      CO2 24.8 mmol/L      Calcium 9.5 mg/dL      Total Protein 8.1 g/dL      Albumin 4.4 g/dL      ALT (SGPT) 11 U/L      AST (SGOT) 19 U/L      Alkaline Phosphatase 103 U/L      Total Bilirubin 0.7 mg/dL      Globulin 3.7 gm/dL      A/G Ratio 1.2 g/dL      BUN/Creatinine Ratio 16.4     Anion Gap 13.2 mmol/L      eGFR 21.9 mL/min/1.73     Narrative:      GFR Normal >60  Chronic Kidney Disease <60  Kidney Failure <15    The GFR formula is only valid for adults with stable renal function between ages 18 and 70.    Magnesium [[id number]]  (Normal) Collected: 12/26/23 1216    Specimen: Blood Updated: 12/26/23 1240     Magnesium 2.4 mg/dL     CBC & Differential [[id number]]  (Abnormal) Collected: 12/26/23 1216    Specimen: Blood Updated: 12/26/23 1223    Narrative:      The following orders were created for panel order CBC & Differential.  Procedure                               Abnormality         Status                     ---------                               -----------         ------                     CBC Auto Differential[[id number]]        Abnormal             Final result                 Please view results for these tests on the individual orders.    CBC Auto Differential [[id number]]  (Abnormal) Collected: 12/26/23 1216    Specimen: Blood Updated: 12/26/23 1223     WBC 10.49 10*3/mm3      RBC 3.04 10*6/mm3      Hemoglobin 8.7 g/dL      Hematocrit 27.9 %      MCV 91.8 fL      MCH 28.6 pg      MCHC 31.2 g/dL      RDW 14.4 %      RDW-SD 48.6 fl      MPV 9.6 fL      Platelets 128 10*3/mm3      Neutrophil % 63.5 %      Lymphocyte % 9.8 %      Monocyte % 5.8 %      Eosinophil % 20.0 %      Basophil % 0.4 %      Immature Grans % 0.5 %      Neutrophils, Absolute 6.66 10*3/mm3      Lymphocytes, Absolute 1.03 10*3/mm3      Monocytes, Absolute 0.61 10*3/mm3      Eosinophils, Absolute 2.10 10*3/mm3      Basophils, Absolute 0.04 10*3/mm3      Immature Grans, Absolute 0.05 10*3/mm3      nRBC 0.0 /100 WBC           Imaging Results (Last 24 Hours)       Procedure Component Value Units Date/Time    CT Cervical Spine Without Contrast [[id number]] Collected: 12/26/23 1433     Updated: 12/26/23 1436    Narrative:      PROCEDURE: CT CERVICAL SPINE WO CONTRAST-     HISTORY: Neck trauma (Age >= 65y), neck pain     TECHNIQUE: Thin section axial CT with sagittal and coronal  reconstructions     FINDINGS: No fracture is present. Alignment is normal.  No bony canal  stenosis is seen. Mild diffuse degenerative disc disease is present.  Mild facet arthropathy is noted.       Impression:      Negative CT evaluation of the cervical spine for acute bony  injury.           This study was performed with techniques to keep radiation doses as low  as reasonably achievable (ALARA). Individualized dose reduction  techniques using automated exposure control or adjustment of vA and/or  kV according to the patient size were employed.      This report was signed and finalized on 12/26/2023 2:34 PM by Niels Sierra MD.       CT Head Without Contrast [[id number]] Collected: 12/26/23 1430     Updated:  12/26/23 1433    Narrative:      PROCEDURE: CT HEAD WO CONTRAST-     HISTORY: Head trauma, minor (Age >= 65y), pain     COMPARISON: 7/15/2022     TECHNIQUE: Noncontrast exam     FINDINGS: Advanced atrophy and chronic ischemic white matter changes are  noted.     No cortical edema is present. There is no mass or hemorrhage. Ventricles  are normal.     Bone windows show no skull fracture or obvious destructive lesion.       Impression:      1. No acute intracranial abnormality or obvious mass.   2. Atrophy and chronic ischemic white matter changes as above.        This study was performed with techniques to keep radiation doses as low  as reasonably achievable (ALARA). Individualized dose reduction  techniques using automated exposure control or adjustment of vA and/or  kV according to the patient size were employed.            This report was signed and finalized on 12/26/2023 2:31 PM by Niels Sierra MD.       XR Hips Bilateral With or Without Pelvis 3-4 View [[id number]] Collected: 12/26/23 1418     Updated: 12/26/23 1421    Narrative:      PROCEDURE: XR HIPS BILATERAL W OR WO PELVIS 3-4 VIEW-     THREE VIEW     HISTORY: trauma     FINDINGS:  Three views show fracture of the subcapital right femoral  neck.  There is moderate displacement. Left proximal femur is intact.     The joint spaces appear normal.          Impression:      Subcapital right femoral neck fracture as above.           This report was signed and finalized on 12/26/2023 2:19 PM by Niels Sierra MD.       XR Knee 3 View Right [[id number]] Collected: 12/26/23 1418     Updated: 12/26/23 1420    Narrative:      PROCEDURE: XR KNEE 3 VW RIGHT-     THREE VIEW     HISTORY: trauma, pain     FINDINGS:  Three views show no evidence of an acute, displaced fracture  or dislocation of the visualized bony architecture.  The joint spaces  appear normal. Generalized osteopenia is present.       Impression:      Unremarkable exam.              This report was  signed and finalized on 12/26/2023 2:18 PM by Niels Sierra MD.       XR Chest 1 View [[id number]] Collected: 12/26/23 1332     Updated: 12/26/23 1335    Narrative:      Chest single view     COMPARISON: Chest from 4-8-2019     FINDINGS: Cardiac silhouette is of normal size. Left subclavian  pacemaker is present.     Mild linear density is identified in the perihilar regions bilaterally,  consistent with atelectasis. No localized airspace infiltrates are seen.       Impression:      1. Chronic changes and mild perihilar atelectasis, as described.  2. No evidence of acute infiltrate.     This report was signed and finalized on 12/26/2023 1:33 PM by Erlin Duncan MD.             Physician Progress Notes (last 24 hours)  Notes from 12/26/23 1037 through 12/27/23 1037   No notes of this type exist for this encounter.          Consult Notes (last 24 hours)        Diego Oliveros MD at 12/27/23 0813        Consult Orders    1. Inpatient Orthopedic Surgery Consult [[id number]] ordered by Leslie Packer DO at 12/26/23 1802                     Patient Care Team:  Sri Bangura MD as PCP - General    Chief complaint right hip pain    Subjective     Patient is a 89 y.o. male presents with right hip pain.  From a ground-level fall.  Complains of pain only in the right hip.        Review of Systems   Pertinent items are noted in HPI    History  Past Medical History:   Diagnosis Date    Anemia     Arthritis     Asthma     Blood clotting disorder     COPD (chronic obstructive pulmonary disease)     H/O emphysema     Hiatal hernia     History of blood transfusion     Hyperlipidemia     Hypertension     Kidney infection     Kidney stones     Positive TB test     Thyroid disease      Past Surgical History:   Procedure Laterality Date    ABDOMINAL AORTIC ANEURYSM REPAIR      CARDIAC ELECTROPHYSIOLOGY PROCEDURE N/A 4/8/2019    Procedure: Device Implant;  Surgeon: Dom Gallardo MD;  Location: [facility] CATH INVASIVE  LOCATION;  Service: Cardiology    HERNIA REPAIR      KNEE SURGERY       Family History   Problem Relation Age of Onset    No Known Problems Mother     No Known Problems Father     Bone cancer Sister     Lung cancer Brother     Colon cancer Neg Hx      Social History     Tobacco Use    Smoking status: Former     Packs/day: 1.50     Years: 30.00     Additional pack years: 0.00     Total pack years: 45.00     Types: Cigarettes     Quit date: 3/5/1998     Years since quittin.8     Passive exposure: Past    Smokeless tobacco: Never   Vaping Use    Vaping Use: Never used   Substance Use Topics    Alcohol use: No    Drug use: No     Medications Prior to Admission   Medication Sig Dispense Refill Last Dose    amLODIPine (NORVASC) 5 MG tablet Take 1 tablet by mouth Daily.       apixaban (ELIQUIS) 2.5 MG tablet tablet Take 1 tablet by mouth Every 12 (Twelve) Hours.       aspirin 81 MG EC tablet Take 1 tablet by mouth Daily.       Cyanocobalamin (VITAMIN B-12 PO) Take  by mouth.       doxazosin (CARDURA) 4 MG tablet Take 1 tablet by mouth Every Night. 90 tablet 3     ferrous sulfate 325 (65 FE) MG tablet Take 1 tablet by mouth Daily With Breakfast.       finasteride (PROSCAR) 5 MG tablet Take 1 tablet by mouth Daily. 90 tablet 3     irbesartan (AVAPRO) 150 MG tablet irbesartan 150 mg tablet   TAKE 1 TABLET EVERY DAY       irbesartan (AVAPRO) 300 MG tablet Take 1 tablet by mouth Every Night.       levothyroxine (SYNTHROID, LEVOTHROID) 100 MCG tablet levothyroxine 100 mcg tablet   TAKE 1 TABLET EVERY DAY       montelukast (SINGULAIR) 10 MG tablet montelukast 10 mg tablet       Multiple Vitamins-Minerals (PRESERVISION AREDS 2 PO) Take  by mouth.       omeprazole (priLOSEC) 20 MG capsule Take 1 capsule by mouth Daily.       ondansetron (ZOFRAN) 4 MG tablet Take 1 tablet by mouth Every 8 (Eight) Hours As Needed for Nausea or Vomiting. 30 tablet 1     Polyethylene Glycol powder Take 1 cap full (17 grams) in 8 oz of  noncarbonated beverage and drink once daily 500 g 3     simvastatin (ZOCOR) 40 MG tablet simvastatin 40 mg tablet       tamsulosin (FLOMAX) 0.4 MG capsule 24 hr capsule tamsulosin 0.4 mg capsule       tiotropium (SPIRIVA) 18 MCG per inhalation capsule Spiriva with HandiHaler 18 mcg and inhalation capsules        Allergies:  Atorvastatin    Objective     Vital Signs  Temp:  [97.2 °F (36.2 °C)-98.8 °F (37.1 °C)] 98.5 °F (36.9 °C)  Heart Rate:  [72-98] 93  Resp:  [16-20] 16  BP: ()/(49-83) 128/71    Physical Exam:      General Appearance:  Alert, cooperative, in no acute distress   Head:  Normocephalic, without obvious abnormality, atraumatic   Eyes:  Lids and lashes normal, conjunctivae and sclerae normal, no icterus, no pallor, corneas clear, PERRLA   Ears:  Ears appear intact with no abnormalities noted   Throat:  No oral lesions, no thrush, oral mucosa moist   Neck:  No adenopathy, supple, trachea midline, no thyromegaly, no carotid bruit, no JVD   Back:  No kyphosis present, no scoliosis present, no skin lesions, erythema or scars, no tenderness to percussion or palpation, range of motion normal   Lungs:  Clear to auscultation, respirations regular, even and unlabored    Heart:  Regular rhythm and normal rate, normal S1 and S2, no murmur, no gallop, no rub, no click   Chest Wall:  No abnormalities observed   Abdomen:  Normal bowel sounds, no masses, no organomegaly, soft non-tender, non-distended, no guarding, no rebound tenderness   Rectal:  Deferred   Extremities:  Moves all extremities well, no edema, no cyanosis, no redness   Pulses:  Pulses palpable and equal bilaterally   Skin:  No bleeding, bruising or rash   Lymph nodes:  No palpable adenopathy   Neurologic:  Cranial nerves 2 - 12 grossly intact, sensation intact, DTR present and equal bilaterally                                                                               Results Review:    I reviewed the patient's new clinical  results.    Assessment & Plan       Acute hypoxic respiratory failure      Right hip displaced femoral neck fracture.    Plan would be for a right hip hemiarthroplasty pending medical optimization.  If patient decides for surgical intervention.  Will discuss with the patient and the power of  sister further for for definitive plans.  Will keep n.p.o. at midnight in case we decide for surgical intervention.    I had discussion with the patient's sister-in-law power of  who provides some care for him he is a household ambulator with a walker.  He lives by himself we did discuss the risk and benefits of surgical versus nonoperative treatment including pneumonia risk with anesthesia stroke heart attack death infection and we will plan for surgical intervention pending medical optimization    I discussed the patients findings and my recommendations with patient.     Diego Oliveros MD  12/27/23  08:13 EST            Electronically signed by Diego Oliveros MD at 12/27/23 9755

## 2023-12-27 NOTE — CONSULTS
[facility] Consult Note      Patient Name: Jonathan Brown  : 1933  MRN: [id number]  Primary Care Physician:  Sri Bangura MD  Referring Physician: No ref. provider found  Date of admission: 2023    Subjective     Reason for Consult: Chronic kidney disease stage IV    HPI:   Jonathan Brown is a 89 y.o. male with past medical history of heavy tobacco abuse with chronic obstructive pulm disease on supplemental oxygen , chronic normocytic anemia, osteoarthritis, history of hiatal hernia, hypertension, dyslipidemia, history of nephrolithiasis, thyroid disease, history of sick sinus syndrome and atrial fibrillation status post pacemaker and chronic kidney disease stage IV.     Patient resides at home and ambulates with assistance of a walker he felt lightheaded and has been having falls at home.  Yesterday patient  fell and sustained trauma to his hip with local deformity and severe discomfort . He presented to the emergency department where he was found to have a right displaced femoral neck fracture, that  require hospital admission    Nephrology consultation been requested for the management    Patient complaining of recent weight loss with some dysphagia    Review of Systems:   14 point review of systems is otherwise negative except for mentioned above on HPI    Personal History     Past Medical History:   Diagnosis Date    Anemia     Arthritis     Asthma     Blood clotting disorder     COPD (chronic obstructive pulmonary disease)     H/O emphysema     Hiatal hernia     History of blood transfusion     Hyperlipidemia     Hypertension     Kidney infection     Kidney stones     Positive TB test     Thyroid disease        Past Surgical History:   Procedure Laterality Date    ABDOMINAL AORTIC ANEURYSM REPAIR      CARDIAC ELECTROPHYSIOLOGY PROCEDURE N/A 2019    Procedure: Device Implant;  Surgeon: Dom Gallardo MD;  Location: [facility] INVASIVE  LOCATION;  Service: Cardiology    HERNIA REPAIR      KNEE SURGERY         Family History: family history includes Bone cancer in his sister; Lung cancer in his brother; No Known Problems in his father and mother.    Social History:  reports that he quit smoking about 25 years ago. His smoking use included cigarettes. He has a 45.00 pack-year smoking history. He has been exposed to tobacco smoke. He has never used smokeless tobacco. He reports that he does not drink alcohol and does not use drugs.    Home Medications:  Prior to Admission medications    Medication Sig Start Date End Date Taking? Authorizing Provider   amLODIPine (NORVASC) 5 MG tablet Take 1 tablet by mouth Daily.    Shanna Loaiza MD   apixaban (ELIQUIS) 2.5 MG tablet tablet Take 1 tablet by mouth Every 12 (Twelve) Hours.    Shanna Loaiza MD   aspirin 81 MG EC tablet Take 1 tablet by mouth Daily.    Shanna Loaiza MD   Cyanocobalamin (VITAMIN B-12 PO) Take  by mouth.    Shanna Loaiza MD   doxazosin (CARDURA) 4 MG tablet Take 1 tablet by mouth Every Night. 9/23/17   Christian Lane MD   ferrous sulfate 325 (65 FE) MG tablet Take 1 tablet by mouth Daily With Breakfast.    Shanna Loaiza MD   finasteride (PROSCAR) 5 MG tablet Take 1 tablet by mouth Daily. 9/27/18   Christian Lane MD   irbesartan (AVAPRO) 150 MG tablet irbesartan 150 mg tablet   TAKE 1 TABLET EVERY DAY    Shanna Loaiza MD   irbesartan (AVAPRO) 300 MG tablet Take 1 tablet by mouth Every Night.    Shanna Loaiza MD   levothyroxine (SYNTHROID, LEVOTHROID) 100 MCG tablet levothyroxine 100 mcg tablet   TAKE 1 TABLET EVERY DAY    Shanna Loaiza MD   montelukast (SINGULAIR) 10 MG tablet montelukast 10 mg tablet    Shanna Loaiza MD   Multiple Vitamins-Minerals (PRESERVISION AREDS 2 PO) Take  by mouth.    Shanna Loaiza MD   omeprazole (priLOSEC) 20 MG capsule Take 1 capsule by mouth Daily.    Shanna Loaiza MD    ondansetron (ZOFRAN) 4 MG tablet Take 1 tablet by mouth Every 8 (Eight) Hours As Needed for Nausea or Vomiting. 10/18/22   Estela Montes APRN   Polyethylene Glycol powder Take 1 cap full (17 grams) in 8 oz of noncarbonated beverage and drink once daily 10/18/22   Estela Montes APRN   simvastatin (ZOCOR) 40 MG tablet simvastatin 40 mg tablet    Provider, MD Shanna   tamsulosin (FLOMAX) 0.4 MG capsule 24 hr capsule tamsulosin 0.4 mg capsule    Provider, MD Shanna   tiotropium (SPIRIVA) 18 MCG per inhalation capsule Spiriva with HandiHaler 18 mcg and inhalation capsules    Provider, MD Shanna       Allergies:  Allergies   Allergen Reactions    Atorvastatin Unknown (See Comments)       Objective     Vitals:   Temp:  [97.2 °F (36.2 °C)-98.8 °F (37.1 °C)] 98.5 °F (36.9 °C)  Heart Rate:  [72-98] 81  Resp:  [12-20] 12  BP: ()/(49-83) 121/75  Flow (L/min):  [3-6] 3    Intake/Output Summary (Last 24 hours) at 12/27/2023 1042  Last data filed at 12/27/2023 0900  Gross per 24 hour   Intake 240 ml   Output 1515 ml   Net -1275 ml       Physical Exam:   Constitutional: Chronically ill and deconditioned most from the oxygen bilateral temporal wasting  HEENT: Sclera anicteric, no conjunctival injection.  Dentures in place  Neck: Supple, no thyromegaly, no lymphadenopathy, trachea at midline, no JVD  Respiratory: Fine crackles bibasilar  Cardiovascular: RRR, systolic murmur.  Left upper chest with pacemaker in place  Gastrointestinal: Positive bowel sounds, abdomen is soft, nontender and nondistended  : Lemons cath in place with gross hematuria  Musculoskeletal: Right femoral deformity  Psychiatric: Appropriate affect, cooperative  Neurologic: Oriented x3, moving all extremities, normal speech and mental status  Skin: Warm and dry       Scheduled Meds:     budesonide-formoterol, 2 puff, Inhalation, BID - RT  ceFAZolin, 2 g, Intravenous, Once  cefTRIAXone, 1,000 mg, Intravenous, Q24H  clindamycin, 900  mg, Intravenous, Once  ethyl alcohol, 2 swab , Nasal, Once  levothyroxine, 100 mcg, Oral, Q AM  montelukast, 10 mg, Oral, Nightly  predniSONE, 40 mg, Oral, Daily With Breakfast  senna-docusate sodium, 2 tablet, Oral, BID  sodium chloride, 10 mL, Intravenous, Q12H      IV Meds:        Results Reviewed:   I have personally reviewed the results from the time of this admission to 12/27/2023 10:42 EST     Results from last 7 days   Lab Units 12/27/23  0434 12/26/23  1216   WBC 10*3/mm3 12.35* 10.49   HEMOGLOBIN g/dL 8.2* 8.7*   HEMATOCRIT % 26.3* 27.9*   PLATELETS 10*3/mm3 126* 128*         Lab Results   Component Value Date    GLUCOSE 162 (H) 12/27/2023    CALCIUM 9.2 12/27/2023     12/27/2023    K 4.9 12/27/2023    CO2 22.2 12/27/2023     12/27/2023    BUN 48 (H) 12/27/2023    CREATININE 2.77 (H) 12/27/2023    EGFRIFNONA 42 (L) 10/19/2021    BCR 17.3 12/27/2023    ANIONGAP 13.8 12/27/2023      Lab Results   Component Value Date    MG 2.4 12/26/2023    PHOS 3.2 05/04/2021    ALBUMIN 4.4 12/26/2023           Assessment / Plan       Acute hypoxic respiratory failure      ASSESSMENT:    - Acute kidney injury likely prerenal patient has been feeling lightheaded prior to his falls while taking ACE inhibitor's/currently placed on hold. Upon admission Cr of 2.6 . Lemons catheter in place . Avoid nephrotoxins. Strict I/O     -Chronic kidney disease stage IV ( baseline 1.8-2.1 )  .  Likely secondary to hypertensive nephrosclerosis ,heavy tobacco abuse.chronic NSAID use, and reduced renal mass. .  Urinalysis pyuria previous microalbuminuria 40 mg.  CT scan abdomen pelvis showing bilateral atrophic kidneys without hydronephrosis and unremarkable bladder    -History of heavy tobacco abuse with chronic obstructive pulm disease images studies showing emphysema.  On chronic supplemental oxygen, inhalers followed by respiratory therapy    -History of hypertension .  Home dose included amlodipine, irbesartan doxazosin  and  tamsulosin    -Right displaced femoral neck fracture, followed by orthopedics on oral analgesia    -History of A-fib with sick sinus syndrome status post pacemaker currently on telemetry    -Acute on chronic normocytic anemia.  Order iron studies    -Benign prostatic hyperplasia 04/13/2023 , patient was previously on tamsulosin and finasteride    -Abdominal aortic aneurysm (HCC) 07/07/2016     -History of  atrial fibrillation with sick sinus syndrome status post pacemaker placement home dose including anticoagulation with apixaban    -Dysphagia (as per patient)  can consider speech therapy if clinically indicated    PLAN:  -Agreeable to holding antihypertensives  -Lemons catheter in place   -Will obtain CPKs  -His Blood pressure has improved and is currently at baseline ( home reading at home around 110 to 120's )   - Will obtain iron panel to determine the need for Epogen or IV iron  -Agree with holding home dose  antihypertensives which include amlodipine, doxazosin and irbesartan and likely cause of his lightheadedness before his fall    Discussed with ICU nursing staff    Thank you for involving us in the care of Jonathan Brown.  Please feel free to call with any questions.    Samy Laurent MD  12/27/23  10:42 [facility]    [facility]  [phone]      Please note that portions of this note were completed with a voice recognition program.

## 2023-12-27 NOTE — PAYOR COMM NOTE
"TO:MK  FROM:JEVON WHITLEY RN PHONE [phone]  IN CLINICALS REF# [id number]    Catia Brown (89 y.o. Male)       Date of Birth   02/13/1933    Social Security Number       Address   [address] [id number]    Home Phone   [phone]    MRN   [id number]       Shinto   Sikhism    Marital Status                               Admission Date   12/26/23    Admission Type   Emergency    Admitting Provider   Leslie Packer DO    Attending Provider   Leslie Packer DO    Department, Room/Bed   [facility] INTENSIVE CARE, I04/1       Discharge Date       Discharge Disposition       Discharge Destination                                 Attending Provider: Leslie Packer DO    Allergies: Atorvastatin    Isolation: None   Infection: None   Code Status: No CPR    Ht: 180.3 cm (71\")   Wt: 68.4 kg (150 lb 12.7 oz)    Admission Cmt: None   Principal Problem: Acute hypoxic respiratory failure [J96.01]                   Active Insurance as of 12/26/2023       Primary Coverage       Payor Plan Insurance Group Employer/Plan Group    HUMANA MEDICARE REPLACEMENT HUMANA MEDICARE REPLACEMENT [id number]       Payor Plan Address Payor Plan Phone Number Payor Plan Fax Number Effective Dates    [address] [address] [phone]  1/1/2019 - None Entered    [facility] [id number]         Subscriber Name Subscriber Birth Date Member ID       CATIA BROWN 2/13/1933 [id number]               Secondary Coverage       Payor Plan Insurance Group Employer/Plan Group    [place]        Payor Plan Address Payor Plan Phone Number Payor Plan Fax Number Effective Dates    [address] [phone]  7/7/2016 - None Entered    [facility] [id number]         Subscriber Name Subscriber Birth Date Member ID       CATIA BROWN 2/13/1933 [id number]                     Emergency Contacts        (Rel.) Home Phone Work Phone " "Mobile Phone    joan morrow (Sister) -- -- [phone]    Kaci Montanez (Daughter) [phone] -- [phone]                 History & Physical        Leslie Packer, DO at 23 1709            [facility]   HISTORY AND PHYSICAL      Name:  Jonathan Brown   Age:  89 y.o.  Sex:  male  :  1933  MRN:  [id number]   Visit Number:  [id number]  Admission Date:  2023  Date Of Service:  23  Primary Care Physician:  Sri Bangura MD    Chief Complaint:     Fall, hip pain    History Of Presenting Illness:      The patient is a chronically ill 89-year-old with medical history of chronic kidney disease stage IV, hypertension, hyperlipidemia, COPD with chronic respiratory failure, hypothyroidism, sick sinus syndrome status post pacemaker, paroxysmal atrial fibrillation, impaired mobility and ADLs, hearing loss, who had presented from home after sustaining a fall.  History obtained from ER record, patient at bedside.  No family available.  He did note he had been \"sick\" for a few days, somewhat short of breath with a cough.  Notes that earlier this morning while ambulating he had felt lightheaded, subsequently fell and landed on his right hip and lower leg.  Unable to ambulate thereafter.  He notes he does have COPD, has not smoked in many years.  Does use inhalers at home.  Notes sister helps him some, is his POA, also has a daughter.    In the ER, he was normotensive, heart rate in the 80s, afebrile.  Saturating 95% on 4 L of oxygen.  White count 10 hemoglobin 8.7 platelets 128.  Procalcitonin 0.07 proBNP 1600 high-sensitivity troponin of 80.  Creatinine 2.68 potassium 5 negative flu COVID test.  ABG pH 7.409 pCO2 38 pO2 58.  Urinalysis with small leukocyte esterase and 2+ bacteria.  CT cervical spine without contrast unremarkable.  CT scan of the head without contrast unremarkable.  3 view right knee x-ray unremarkable.  Bilateral hip x-rays with evidence " of a subcapital right femoral neck fracture with displacement noted.  Chest x-ray with chronic findings.  The patient was given pain control, bronchodilators, steroids.  Dr. Oliveros with orthopedics to see in consult.  We were asked admit thereafter.    Review Of Systems:    All systems were reviewed and negative except as mentioned in history of presenting illness, assessment and plan.    Past Medical History: Patient  has a past medical history of Anemia, Arthritis, Asthma, Blood clotting disorder, COPD (chronic obstructive pulmonary disease), H/O emphysema, Hiatal hernia, History of blood transfusion, Hyperlipidemia, Hypertension, Kidney infection, Kidney stones, Positive TB test, and Thyroid disease.    Past Surgical History: Patient  has a past surgical history that includes Hernia repair; Knee surgery; AAA repair, open; and Cardiac electrophysiology procedure (N/A, 4/8/2019).    Social History: Patient  reports that he quit smoking about 25 years ago. His smoking use included cigarettes. He has a 45.00 pack-year smoking history. He has been exposed to tobacco smoke. He has never used smokeless tobacco. He reports that he does not drink alcohol and does not use drugs.    Family History:  Patient's family history has been reviewed and found to be noncontributory.     Allergies:      Atorvastatin    Home Medications:    Prior to Admission Medications       Prescriptions Last Dose Informant Patient Reported? Taking?    amLODIPine (NORVASC) 5 MG tablet   Yes No    Take 1 tablet by mouth Daily.    apixaban (ELIQUIS) 2.5 MG tablet tablet   Yes No    Take 1 tablet by mouth Every 12 (Twelve) Hours.    aspirin 81 MG EC tablet   Yes No    Take 1 tablet by mouth Daily.    Cyanocobalamin (VITAMIN B-12 PO)   Yes No    Take  by mouth.    doxazosin (CARDURA) 4 MG tablet   No No    Take 1 tablet by mouth Every Night.    ferrous sulfate 325 (65 FE) MG tablet   Yes No    Take 1 tablet by mouth Daily With Breakfast.    finasteride  (PROSCAR) 5 MG tablet   No No    Take 1 tablet by mouth Daily.    irbesartan (AVAPRO) 150 MG tablet   Yes No    irbesartan 150 mg tablet   TAKE 1 TABLET EVERY DAY    irbesartan (AVAPRO) 300 MG tablet   Yes No    Take 1 tablet by mouth Every Night.    levothyroxine (SYNTHROID, LEVOTHROID) 100 MCG tablet   Yes No    levothyroxine 100 mcg tablet   TAKE 1 TABLET EVERY DAY    montelukast (SINGULAIR) 10 MG tablet   Yes No    montelukast 10 mg tablet    Multiple Vitamins-Minerals (PRESERVISION AREDS 2 PO)   Yes No    Take  by mouth.    omeprazole (priLOSEC) 20 MG capsule   Yes No    Take 1 capsule by mouth Daily.    ondansetron (ZOFRAN) 4 MG tablet   No No    Take 1 tablet by mouth Every 8 (Eight) Hours As Needed for Nausea or Vomiting.    Polyethylene Glycol powder   No No    Take 1 cap full (17 grams) in 8 oz of noncarbonated beverage and drink once daily    simvastatin (ZOCOR) 40 MG tablet   Yes No    simvastatin 40 mg tablet    tamsulosin (FLOMAX) 0.4 MG capsule 24 hr capsule   Yes No    tamsulosin 0.4 mg capsule    tiotropium (SPIRIVA) 18 MCG per inhalation capsule   Yes No    Spiriva with HandiHaler 18 mcg and inhalation capsules          ED Medications:    Medications   sodium chloride 0.9 % flush 10 mL (has no administration in time range)   Morphine sulfate (PF) injection 2 mg (2 mg Intravenous Given 12/26/23 1304)   ondansetron (ZOFRAN) injection 4 mg (4 mg Intravenous Given 12/26/23 1303)   methylPREDNISolone sodium succinate (SOLU-Medrol) injection 125 mg (125 mg Intravenous Given 12/26/23 1435)   ipratropium-albuterol (DUO-NEB) nebulizer solution 3 mL (3 mL Nebulization Given 12/26/23 1439)   magnesium sulfate 2g/50 mL (PREMIX) infusion (0 g Intravenous Stopped 12/26/23 1529)   morphine injection 4 mg (4 mg Intravenous Given 12/26/23 1438)     Vital Signs:  Temp:  [98.2 °F (36.8 °C)] 98.2 °F (36.8 °C)  Heart Rate:  [77-98] 89  Resp:  [18] 18  BP: ()/(55-83) 109/63        12/26/23  1213   Weight: 64.4 kg  "(142 lb)     Body mass index is 19.8 kg/m².    Physical Exam:     Most recent vital Signs: /63   Pulse 89   Temp 98.2 °F (36.8 °C) (Oral)   Resp 18   Ht 180.3 cm (71\")   Wt 64.4 kg (142 lb)   SpO2 92%   BMI 19.80 kg/m²     Physical Exam  Constitutional:       Appearance: He is ill-appearing.   HENT:      Head: Normocephalic.      Ears:      Comments: Hard of hearing  Eyes:      Extraocular Movements: Extraocular movements intact.      Pupils: Pupils are equal, round, and reactive to light.   Cardiovascular:      Rate and Rhythm: Normal rate and regular rhythm.      Pulses: Normal pulses.      Heart sounds: No murmur heard.  Pulmonary:      Breath sounds: No stridor. Wheezing present. No rhonchi.   Abdominal:      General: Abdomen is flat. Bowel sounds are normal. There is no distension.      Tenderness: There is no abdominal tenderness. There is no guarding.   Musculoskeletal:         General: Deformity (Right leg externally rotated tender) present.      Right lower leg: No edema.      Left lower leg: No edema.   Skin:     General: Skin is warm.      Capillary Refill: Capillary refill takes less than 2 seconds.      Findings: No bruising or lesion.   Neurological:      General: No focal deficit present.      Mental Status: He is alert. Mental status is at baseline.      Motor: Weakness present.   Psychiatric:         Mood and Affect: Mood normal.         Thought Content: Thought content normal.         Laboratory data:    I have reviewed the labs done in the emergency room.    Results from last 7 days   Lab Units 12/26/23  1216   SODIUM mmol/L 139   POTASSIUM mmol/L 5.0   CHLORIDE mmol/L 101   CO2 mmol/L 24.8   BUN mg/dL 44*   CREATININE mg/dL 2.68*   CALCIUM mg/dL 9.5   BILIRUBIN mg/dL 0.7   ALK PHOS U/L 103   ALT (SGPT) U/L 11   AST (SGOT) U/L 19   GLUCOSE mg/dL 127*     Results from last 7 days   Lab Units 12/26/23  1216   WBC 10*3/mm3 10.49   HEMOGLOBIN g/dL 8.7*   HEMATOCRIT % 27.9*   PLATELETS " 10*3/mm3 128*         Results from last 7 days   Lab Units 12/26/23  1404 12/26/23  1216   HSTROP T ng/L 77* 80*     Results from last 7 days   Lab Units 12/26/23  1216   PROBNP pg/mL 1,662.0             Results from last 7 days   Lab Units 12/26/23  1327   PH, ARTERIAL pH units 7.409   PO2 ART mm Hg 58.9*   PCO2, ARTERIAL mm Hg 38.7   HCO3 ART mmol/L 24.4     Results from last 7 days   Lab Units 12/26/23  1350   COLOR UA  Yellow   GLUCOSE UA  Negative   KETONES UA  Negative   BLOOD UA  Negative   LEUKOCYTES UA  Small (1+)*   PH, URINE  6.0   BILIRUBIN UA  Negative   UROBILINOGEN UA  0.2 E.U./dL   RBC UA /HPF None Seen   WBC UA /HPF 6-10*       Pain Management Panel  More data may exist         Latest Ref Rng & Units 10/19/2021 10/9/2015   Pain Management Panel   Creatinine, Urine mg/dL 74.5  86.9        EKG:      Atrial paced rhythm    Radiology:    CT Cervical Spine Without Contrast    Result Date: 12/26/2023  PROCEDURE: CT CERVICAL SPINE WO CONTRAST-  HISTORY: Neck trauma (Age >= 65y), neck pain  TECHNIQUE: Thin section axial CT with sagittal and coronal reconstructions  FINDINGS: No fracture is present. Alignment is normal.  No bony canal stenosis is seen. Mild diffuse degenerative disc disease is present. Mild facet arthropathy is noted.      Negative CT evaluation of the cervical spine for acute bony injury.    This study was performed with techniques to keep radiation doses as low as reasonably achievable (ALARA). Individualized dose reduction techniques using automated exposure control or adjustment of vA and/or kV according to the patient size were employed.  This report was signed and finalized on 12/26/2023 2:34 PM by Niels Sierra MD.      CT Head Without Contrast    Result Date: 12/26/2023  PROCEDURE: CT HEAD WO CONTRAST-  HISTORY: Head trauma, minor (Age >= 65y), pain  COMPARISON: 7/15/2022  TECHNIQUE: Noncontrast exam  FINDINGS: Advanced atrophy and chronic ischemic white matter changes are noted.  No  cortical edema is present. There is no mass or hemorrhage. Ventricles are normal.  Bone windows show no skull fracture or obvious destructive lesion.      1. No acute intracranial abnormality or obvious mass. 2. Atrophy and chronic ischemic white matter changes as above.   This study was performed with techniques to keep radiation doses as low as reasonably achievable (ALARA). Individualized dose reduction techniques using automated exposure control or adjustment of vA and/or kV according to the patient size were employed.    This report was signed and finalized on 12/26/2023 2:31 PM by Niels Sierra MD.      XR Hips Bilateral With or Without Pelvis 3-4 View    Result Date: 12/26/2023  PROCEDURE: XR HIPS BILATERAL W OR WO PELVIS 3-4 VIEW-  THREE VIEW  HISTORY: trauma  FINDINGS:  Three views show fracture of the subcapital right femoral neck.  There is moderate displacement. Left proximal femur is intact.  The joint spaces appear normal.       Subcapital right femoral neck fracture as above.    This report was signed and finalized on 12/26/2023 2:19 PM by Niels Sierra MD.      XR Knee 3 View Right    Result Date: 12/26/2023  PROCEDURE: XR KNEE 3 VW RIGHT-  THREE VIEW  HISTORY: trauma, pain  FINDINGS:  Three views show no evidence of an acute, displaced fracture or dislocation of the visualized bony architecture.  The joint spaces appear normal. Generalized osteopenia is present.      Unremarkable exam.     This report was signed and finalized on 12/26/2023 2:18 PM by Niels Sierra MD.      XR Chest 1 View    Result Date: 12/26/2023  Chest single view  COMPARISON: Chest from 4-8-2019  FINDINGS: Cardiac silhouette is of normal size. Left subclavian pacemaker is present.  Mild linear density is identified in the perihilar regions bilaterally, consistent with atelectasis. No localized airspace infiltrates are seen.      1. Chronic changes and mild perihilar atelectasis, as described. 2. No evidence of acute infiltrate.   This report was signed and finalized on 12/26/2023 1:33 PM by Erlin Duncan MD.       Assessment:    Right femoral neck fracture, POA  Acute on chronic hypoxic respiratory failure, POA   COPD with exacerbation  History of sick sinus syndrome status post pacemaker  Atrial fibrillation on Eliquis  Hearing loss  Acute on chronic kidney disease stage IV    Plan:    Admit as inpatient    Hip fracture:  Dr. Oliveros with orthopedics to see in consultation.  After initial discussion, patient not too sure if he wants to proceed with surgery, discussed significant risk without surgery.  He is concerned about his underlying medical conditions and anesthesia.  He will need preop evaluation by cardiology and improvement in pulmonary status is much as possible.  Tentatively plan for surgery may be Thursday.  As needed pain control.    COPD:  Likely worsened due to fall, however had symptoms of exacerbation prior to.  Currently oxygen saturations are stable on 4 L.  Will continue with bronchodilators and prednisone.    Sick sinus syndrome:  Follows with Dr. Haro.  Has pacemaker, also with atrial fibrillation chronically on Eliquis.    Renal failure:  Creatinine appears to be worsening from most recent in system.  Will place Lemons catheter for any possible retention, impaired mobility currently as well.  History of BPH.  Will consult with nephrology.  Avoid nephrotoxic agents.    Patient otherwise meets inpatient level care with anticipated stay greater than 2 midnights.  Further recommendation be depend upon improvement of clinical condition.  Plan care discussed with the patient no family currently at bedside.  He did note that he is a DNI/DNR.    Risk Assessment: High  DVT Prophylaxis: SCDs  Code Status: DNR  Diet: As tolerated, has dentures    Advance Care Planning  ACP discussion was held with the patient during this visit. Patient has an advance directive in EMR which is still valid.            Leslie Packer,  DO  12/26/23  17:09 EST    Dictated utilizing Dragon dictation.    Electronically signed by Leslie Packer DO at 12/26/23 1813          Emergency Department Notes        Lalit Patel MD at 12/26/23 1256        Procedure Orders    1. Critical Care [[id number]] ordered by Lalit Patel MD                 Subjective:  History of Present Illness:    Patient is a 89-year-old male history of COPD on home oxygen at night, hypertension, hyperlipidemia, nephrolithiasis, hypothyroidism presents today after a fall.  Reports that he has been increasingly short of breath over the last 4 days.  Dyspneic this morning.  Attempted to walk to his house on his own, became very lightheaded and fell onto his right hip.  Did hit his head, denies losing consciousness.  Denies chest pain.  Has pain to his right hip, right knee.  Denies any abdominal pain nausea vomiting or diarrhea.  States that he had cough and congestion prior to arrival.      Nurses Notes reviewed and agree, including vitals, allergies, social history and prior medical history.     REVIEW OF SYSTEMS: All systems reviewed and not pertinent unless noted.  Review of Systems   Constitutional:  Positive for activity change. Negative for appetite change, chills, fatigue and fever.   HENT:  Positive for congestion. Negative for sinus pressure, sneezing and trouble swallowing.    Eyes:  Negative for discharge and itching.   Respiratory:  Positive for cough and shortness of breath.    Cardiovascular:  Negative for chest pain and palpitations.   Gastrointestinal:  Negative for abdominal distention, abdominal pain, diarrhea, nausea and vomiting.   Endocrine: Negative for cold intolerance and heat intolerance.   Genitourinary:  Negative for decreased urine volume, dysuria and urgency.   Musculoskeletal:  Negative for gait problem, neck pain and neck stiffness.   Skin:  Negative for color change and rash.   Allergic/Immunologic: Negative for immunocompromised  "state.   Neurological:  Negative for facial asymmetry and headaches.   Hematological:  Negative for adenopathy.   Psychiatric/Behavioral:  Negative for self-injury and suicidal ideas.        Past Medical History:   Diagnosis Date    Anemia     Arthritis     Asthma     Blood clotting disorder     COPD (chronic obstructive pulmonary disease)     H/O emphysema     Hiatal hernia     History of blood transfusion     Hyperlipidemia     Hypertension     Kidney infection     Kidney stones     Positive TB test     Thyroid disease        Allergies:    Atorvastatin      Past Surgical History:   Procedure Laterality Date    ABDOMINAL AORTIC ANEURYSM REPAIR      CARDIAC ELECTROPHYSIOLOGY PROCEDURE N/A 2019    Procedure: Device Implant;  Surgeon: Dom Gallardo MD;  Location: [facility] IN[facility]AS[facility] LOC[facility];  Service: Cardiology    HERNIA REPAIR      KNEE SURGERY           Social History     Socioeconomic History    Marital status:     Number of children: 2   Tobacco Use    Smoking status: Former     Packs/day: 1.50     Years: 30.00     Additional pack years: 0.00     Total pack years: 45.00     Types: Cigarettes     Quit date: 3/5/1998     Years since quittin.8     Passive exposure: Past    Smokeless tobacco: Never   Vaping Use    Vaping Use: Never used   Substance and Sexual Activity    Alcohol use: No    Drug use: No    Sexual activity: Defer         Family History   Problem Relation Age of Onset    No Known Problems Mother     No Known Problems Father     Bone cancer Sister     Lung cancer Brother     Colon cancer Neg Hx        Objective  Physical Exam:  /63   Pulse 89   Temp 98.6 °F (37 °C) (Temporal)   Resp 18   Ht 180.3 cm (71\")   Wt 68.6 kg (151 lb 3.8 oz)   SpO2 92%   BMI 21.09 kg/m²      Physical Exam  Constitutional:       General: He is not in acute distress.     Appearance: Normal appearance. He is normal weight. He is not ill-appearing.   HENT:      Head: Normocephalic and " atraumatic.      Nose: Nose normal. Congestion and rhinorrhea present.      Mouth/Throat:      Mouth: Mucous membranes are moist.      Pharynx: Oropharynx is clear.   Eyes:      Extraocular Movements: Extraocular movements intact.      Conjunctiva/sclera: Conjunctivae normal.      Pupils: Pupils are equal, round, and reactive to light.   Cardiovascular:      Rate and Rhythm: Normal rate and regular rhythm.      Pulses: Normal pulses.   Pulmonary:      Effort: Pulmonary effort is normal. No respiratory distress.      Breath sounds: Normal breath sounds.   Abdominal:      General: Abdomen is flat. Bowel sounds are normal. There is no distension.      Palpations: Abdomen is soft.      Tenderness: There is no abdominal tenderness. There is no guarding or rebound.   Musculoskeletal:         General: Tenderness and signs of injury present. No swelling or deformity. Normal range of motion.      Cervical back: Normal range of motion and neck supple. No rigidity or tenderness.      Comments: Patient with pain and flex position to right knee, right hip, neurovascular intact   Skin:     General: Skin is warm and dry.      Capillary Refill: Capillary refill takes less than 2 seconds.   Neurological:      General: No focal deficit present.      Mental Status: He is alert and oriented to person, place, and time. Mental status is at baseline.      Cranial Nerves: No cranial nerve deficit.      Sensory: No sensory deficit.      Motor: No weakness.   Psychiatric:         Mood and Affect: Mood normal.         Behavior: Behavior normal.         Thought Content: Thought content normal.         Judgment: Judgment normal.         Critical Care    Performed by: Lalit Patel MD  Authorized by: Leslie Packer DO    Critical care provider statement:     Critical care time (minutes):  30    Critical care time was exclusive of:  Separately billable procedures and treating other patients    Critical care was necessary to treat  or prevent imminent or life-threatening deterioration of the following conditions:  Respiratory failure and trauma    Critical care was time spent personally by me on the following activities:  Blood draw for specimens, development of treatment plan with patient or surrogate, discussions with consultants, discussions with primary provider, evaluation of patient's response to treatment, examination of patient, obtaining history from patient or surrogate, ordering and performing treatments and interventions, ordering and review of laboratory studies, ordering and review of radiographic studies, pulse oximetry, re-evaluation of patient's condition and review of old charts    Care discussed with: admitting provider        ED Course:    ED Course as of 12/26/23 1831   Tue Dec 26, 2023   1303 EKG interpreted by me, normal sinus rhythm with no concerning ST changes noted, intermittent PVCs noted on the lead, rate of 82 [JE]      ED Course User Index  [JE] Lalit Patel MD       Lab Results (last 24 hours)       Procedure Component Value Units Date/Time    CBC & Differential [[id number]]  (Abnormal) Collected: 12/26/23 1216    Specimen: Blood Updated: 12/26/23 1223    Narrative:      The following orders were created for panel order CBC & Differential.  Procedure                               Abnormality         Status                     ---------                               -----------         ------                     CBC Auto Differential[[id number]]        Abnormal            Final result                 Please view results for these tests on the individual orders.    Comprehensive Metabolic Panel [[id number]]  (Abnormal) Collected: 12/26/23 1216    Specimen: Blood Updated: 12/26/23 1240     Glucose 127 mg/dL      BUN 44 mg/dL      Creatinine 2.68 mg/dL      Sodium 139 mmol/L      Potassium 5.0 mmol/L      Chloride 101 mmol/L      CO2 24.8 mmol/L      Calcium 9.5 mg/dL      Total Protein 8.1 g/dL      Albumin  4.4 g/dL      ALT (SGPT) 11 U/L      AST (SGOT) 19 U/L      Alkaline Phosphatase 103 U/L      Total Bilirubin 0.7 mg/dL      Globulin 3.7 gm/dL      A/G Ratio 1.2 g/dL      BUN/Creatinine Ratio 16.4     Anion Gap 13.2 mmol/L      eGFR 21.9 mL/min/1.73     Narrative:      GFR Normal >60  Chronic Kidney Disease <60  Kidney Failure <15    The GFR formula is only valid for adults with stable renal function between ages 18 and 70.    Single High Sensitivity Troponin T [[id number]]  (Abnormal) Collected: 12/26/23 1216    Specimen: Blood Updated: 12/26/23 1256     HS Troponin T 80 ng/L     Narrative:      High Sensitive Troponin T Reference Range:  <14.0 ng/L- Negative Female for AMI  <22.0 ng/L- Negative Male for AMI  >=14 - Abnormal Female indicating possible myocardial injury.  >=22 - Abnormal Male indicating possible myocardial injury.   Clinicians would have to utilize clinical acumen, EKG, Troponin, and serial changes to determine if it is an Acute Myocardial Infarction or myocardial injury due to an underlying chronic condition.         Magnesium [[id number]]  (Normal) Collected: 12/26/23 1216    Specimen: Blood Updated: 12/26/23 1240     Magnesium 2.4 mg/dL     CBC Auto Differential [[id number]]  (Abnormal) Collected: 12/26/23 1216    Specimen: Blood Updated: 12/26/23 1223     WBC 10.49 10*3/mm3      RBC 3.04 10*6/mm3      Hemoglobin 8.7 g/dL      Hematocrit 27.9 %      MCV 91.8 fL      MCH 28.6 pg      MCHC 31.2 g/dL      RDW 14.4 %      RDW-SD 48.6 fl      MPV 9.6 fL      Platelets 128 10*3/mm3      Neutrophil % 63.5 %      Lymphocyte % 9.8 %      Monocyte % 5.8 %      Eosinophil % 20.0 %      Basophil % 0.4 %      Immature Grans % 0.5 %      Neutrophils, Absolute 6.66 10*3/mm3      Lymphocytes, Absolute 1.03 10*3/mm3      Monocytes, Absolute 0.61 10*3/mm3      Eosinophils, Absolute 2.10 10*3/mm3      Basophils, Absolute 0.04 10*3/mm3      Immature Grans, Absolute 0.05 10*3/mm3      nRBC 0.0 /100 WBC     C-reactive  "Protein [[id number]]  (Normal) Collected: 12/26/23 1216    Specimen: Blood Updated: 12/26/23 1502     C-Reactive Protein <0.30 mg/dL     Procalcitonin [[id number]]  (Normal) Collected: 12/26/23 1216    Specimen: Blood Updated: 12/26/23 1521     Procalcitonin 0.07 ng/mL     Narrative:      As a Marker for Sepsis (Non-Neonates):    1. <0.5 ng/mL represents a low risk of severe sepsis and/or septic shock.  2. >2 ng/mL represents a high risk of severe sepsis and/or septic shock.    As a Marker for Lower Respiratory Tract Infections that require antibiotic therapy:    PCT on Admission    Antibiotic Therapy       6-12 Hrs later    >0.5                Strongly Recommended  >0.25 - <0.5        Recommended   0.1 - 0.25          Discouraged              Remeasure/reassess PCT  <0.1                Strongly Discouraged     Remeasure/reassess PCT    As 28 day mortality risk marker: \"Change in Procalcitonin Result\" (>80% or <=80%) if Day 0 (or Day 1) and Day 4 values are available. Refer to [email or web address].[email or web address] [email or web address]-pct-[email or web address]    Change in PCT <=80%  A decrease of PCT levels below or equal to 80% defines a positive change in PCT test result representing a higher risk for 28-day all-cause mortality of patients diagnosed with severe sepsis for septic shock.    Change in PCT >80%  A decrease of PCT levels of more than 80% defines a negative change in PCT result representing a lower risk for 28-day all-cause mortality of patients diagnosed with severe sepsis or septic shock.       BNP [[id number]]  (Normal) Collected: 12/26/23 1216    Specimen: Blood Updated: 12/26/23 1458     proBNP 1,662.0 pg/mL     Narrative:      This assay is used as an aid in the diagnosis of individuals suspected of having heart failure. It can be used as an aid in the diagnosis of acute decompensated heart failure (ADHF) in patients presenting with signs and symptoms of ADHF to the emergency department (ED). In addition, NT-proBNP of <300 pg/mL indicates ADHF is " not likely.    Age Range Result Interpretation  NT-proBNP Concentration (pg/mL:      <50             Positive            >450                   Gray                 300-450                    Negative             <300    50-75           Positive            >900                  Gray                300-900                  Negative            <300      >75             Positive            >1800                  Gray                300-1800                  Negative            <300    COVID-19 and FLU A/B PCR, 1 HR TAT - Swab, Nasopharynx [[id number]]  (Normal) Collected: 12/26/23 1217    Specimen: Swab from Nasopharynx Updated: 12/26/23 1254     COVID19 Not Detected     Influenza A PCR Not Detected     Influenza B PCR Not Detected    Narrative:      Fact sheet for providers: https://www.fda.gov/media/415802/download    Fact sheet for patients: https://www.fda.gov/media/694691/download    Test performed by PCR.    Blood Gas, Arterial With Co-Ox [[id number]]  (Abnormal) Collected: 12/26/23 1327    Specimen: Arterial Blood Updated: 12/26/23 1328     Site Right Radial     Domingo's Test N/A     pH, Arterial 7.409 pH units      pCO2, Arterial 38.7 mm Hg      pO2, Arterial 58.9 mm Hg      Comment: 84 Value below reference range        HCO3, Arterial 24.4 mmol/L      Base Excess, Arterial -0.2 mmol/L      Comment: 84 Value below reference range        O2 Saturation, Arterial 91.2 %      Comment: 84 Value below reference range        Hematocrit, Blood Gas 24.3 %      Comment: 84 Value below reference range        Oxyhemoglobin 89.2 %      Comment: 84 Value below reference range        Methemoglobin 0.50 %      Carboxyhemoglobin 1.7 %      A-a DO2 41.3 mmHg      Barometric Pressure for Blood Gas 732 mmHg      Modality Nasal Cannula     Flow Rate 4.0 lpm      Ventilator Mode NA     Collected by [id number]     Comment: Meter: [id number]     :  [id number]        pH, Temp Corrected --     pCO2, Temperature Corrected --      pO2, Temperature Corrected --    Urinalysis With Microscopic If Indicated (No Culture) - Urine, Clean Catch [[id number]]  (Abnormal) Collected: 12/26/23 1350    Specimen: Urine, Clean Catch Updated: 12/26/23 1404     Color, UA Yellow     Appearance, UA Cloudy     pH, UA 6.0     Specific Gravity, UA 1.012     Glucose, UA Negative     Ketones, UA Negative     Bilirubin, UA Negative     Blood, UA Negative     Protein, UA Negative     Leuk Esterase, UA Small (1+)     Nitrite, UA Negative     Urobilinogen, UA 0.2 E.U./dL    Urinalysis, Microscopic Only - Urine, Clean Catch [[id number]]  (Abnormal) Collected: 12/26/23 1350    Specimen: Urine, Clean Catch Updated: 12/26/23 1426     RBC, UA None Seen /HPF      WBC, UA 6-10 /HPF      Bacteria, UA 2+ /HPF      Squamous Epithelial Cells, UA 3-6 /HPF      Comment: Some small clumps        Renal Epithelial Cells, UA 0-2 /HPF      Hyaline Casts, UA None Seen /LPF      Methodology Manual Light Microscopy    High Sensitivity Troponin T 2Hr [[id number]]  (Abnormal) Collected: 12/26/23 1404    Specimen: Blood Updated: 12/26/23 1502     HS Troponin T 77 ng/L      Troponin T Delta -3 ng/L     Narrative:      High Sensitive Troponin T Reference Range:  <14.0 ng/L- Negative Female for AMI  <22.0 ng/L- Negative Male for AMI  >=14 - Abnormal Female indicating possible myocardial injury.  >=22 - Abnormal Male indicating possible myocardial injury.   Clinicians would have to utilize clinical acumen, EKG, Troponin, and serial changes to determine if it is an Acute Myocardial Infarction or myocardial injury due to an underlying chronic condition.                  CT Cervical Spine Without Contrast    Result Date: 12/26/2023  PROCEDURE: CT CERVICAL SPINE WO CONTRAST-  HISTORY: Neck trauma (Age >= 65y), neck pain  TECHNIQUE: Thin section axial CT with sagittal and coronal reconstructions  FINDINGS: No fracture is present. Alignment is normal.  No bony canal stenosis is seen. Mild diffuse  degenerative disc disease is present. Mild facet arthropathy is noted.      Impression: Negative CT evaluation of the cervical spine for acute bony injury.    This study was performed with techniques to keep radiation doses as low as reasonably achievable (ALARA). Individualized dose reduction techniques using automated exposure control or adjustment of vA and/or kV according to the patient size were employed.  This report was signed and finalized on 12/26/2023 2:34 PM by Niels Sierra MD.      CT Head Without Contrast    Result Date: 12/26/2023  PROCEDURE: CT HEAD WO CONTRAST-  HISTORY: Head trauma, minor (Age >= 65y), pain  COMPARISON: 7/15/2022  TECHNIQUE: Noncontrast exam  FINDINGS: Advanced atrophy and chronic ischemic white matter changes are noted.  No cortical edema is present. There is no mass or hemorrhage. Ventricles are normal.  Bone windows show no skull fracture or obvious destructive lesion.      Impression: 1. No acute intracranial abnormality or obvious mass. 2. Atrophy and chronic ischemic white matter changes as above.   This study was performed with techniques to keep radiation doses as low as reasonably achievable (ALARA). Individualized dose reduction techniques using automated exposure control or adjustment of vA and/or kV according to the patient size were employed.    This report was signed and finalized on 12/26/2023 2:31 PM by Niels Sierra MD.      XR Hips Bilateral With or Without Pelvis 3-4 View    Result Date: 12/26/2023  PROCEDURE: XR HIPS BILATERAL W OR WO PELVIS 3-4 VIEW-  THREE VIEW  HISTORY: trauma  FINDINGS:  Three views show fracture of the subcapital right femoral neck.  There is moderate displacement. Left proximal femur is intact.  The joint spaces appear normal.       Impression: Subcapital right femoral neck fracture as above.    This report was signed and finalized on 12/26/2023 2:19 PM by Niels Sierra MD.      XR Knee 3 View Right    Result Date: 12/26/2023  PROCEDURE:  XR KNEE 3 VW RIGHT-  THREE VIEW  HISTORY: trauma, pain  FINDINGS:  Three views show no evidence of an acute, displaced fracture or dislocation of the visualized bony architecture.  The joint spaces appear normal. Generalized osteopenia is present.      Impression: Unremarkable exam.     This report was signed and finalized on 12/26/2023 2:18 PM by Niels Sierra MD.      XR Chest 1 View    Result Date: 12/26/2023  Chest single view  COMPARISON: Chest from 4-8-2019  FINDINGS: Cardiac silhouette is of normal size. Left subclavian pacemaker is present.  Mild linear density is identified in the perihilar regions bilaterally, consistent with atelectasis. No localized airspace infiltrates are seen.      Impression: 1. Chronic changes and mild perihilar atelectasis, as described. 2. No evidence of acute infiltrate.  This report was signed and finalized on 12/26/2023 1:33 PM by Erlin Duncan MD.          MDM     Amount and/or Complexity of Data Reviewed  Decide to obtain previous medical records or to obtain history from someone other than the patient: yes        Initial impression of presenting illness: Shortness of breath, fall, hip pain    DDX: includes but is not limited to: Intracranial hemorrhage, C-spine fracture, hip fracture, tibial fracture, COPD exacerbation, bacterial pneumonia, PE    Patient arrives guarded with vitals interpreted by myself.     Pertinent features from physical exam: Patient requiring 6 L nasal cannula on exam.    Initial diagnostic plan: CBC, CMP, troponin, BNP, CRP, Pro-Chuck, ECG, chest x-ray, CT head, C-spine, plain film of right hip, plain film of right knee    Results from initial plan were reviewed and interpreted by me revealing no concerns for bacterial pneumonia, patient with increased work of breathing consistent with COPD exacerbation, patient with femoral neck fracture to the right, no other trauma noted    Diagnostic information from other sources: Discussed with family at bedside  and reviewed past medical records    Interventions / Re-evaluation: Given morphine for pain control, given concerns for COPD exacerbation given Solu-Medrol, DuoNeb, magnesium with no improvement hypoxia    Results/clinical rationale were discussed with patient and family at bedside    Consultations/Discussion of results with other physicians: Given my concern for hip fracture discussed with orthopedic surgery who will consult on the patient.  Discussed with hospitalist given concerns for hip fracture and COPD exacerbation and admitted to their service further management    Disposition plan: Admit  -----        Final diagnoses:   COPD exacerbation   Closed fracture of neck of right femur, initial encounter          Lalit Patel MD  12/26/23 1831      Electronically signed by Lalit Patel MD at 12/26/23 1831       Garret Easley PCT at 12/26/23 1247       Summary:LAB                 Transferred lab to RN to report a critical result.     Electronically signed by Garret Easley PCT at 12/26/23 1246       Vital Signs (last day)       Date/Time Temp Temp src Pulse Resp BP Patient Position SpO2    12/27/23 0900 -- -- 81 -- -- -- 99    12/27/23 0800 -- -- 94 12 121/75 Lying 100    12/27/23 0700 98.5 (36.9) Oral -- -- -- -- --    12/27/23 0644 -- -- -- 16 -- -- --    12/27/23 0400 97.2 (36.2) Axillary 93 16 128/71 Lying 95    12/27/23 0019 98.8 (37.1) Axillary -- -- -- -- --    12/27/23 0006 -- -- 98 20 105/63 Lying 96    12/27/23 0000 -- -- 72 -- 83/49 -- 96    12/26/23 2200 -- -- 84 -- 128/69 -- 99    12/26/23 2112 -- -- 92 18 -- -- --    12/26/23 2000 -- -- 92 20 134/77 Lying 94    12/26/23 1900 97.8 (36.6) Axillary -- -- -- -- --    12/26/23 1800 -- -- 96 17 135/74 Lying 89    12/26/23 1705 98.6 (37) Temporal -- -- -- -- --    12/26/23 1645 -- -- 89 -- 109/63 -- 92    12/26/23 1630 -- -- 94 -- 113/63 -- 92    12/26/23 1629 -- -- 91 -- -- -- 92    12/26/23 1615 -- -- 98 -- 111/60 -- --     12/26/23 1600 -- -- 92 -- 107/55 -- 95    12/26/23 1545 -- -- 91 -- 108/58 -- 95    12/26/23 1530 -- -- 92 -- 96/65 -- 94    12/26/23 1515 -- -- 89 -- 91/62 -- 93    12/26/23 1445 -- -- 89 -- 112/64 -- 100    12/26/23 1402 -- -- 84 -- 114/63 -- 94    12/26/23 1355 -- -- 89 -- -- -- 95    12/26/23 1345 -- -- 95 -- 136/83 -- 95    12/26/23 1335 -- -- 77 -- -- -- 93    12/26/23 1330 -- -- 77 -- 135/67 -- 97    12/26/23 1325 -- -- 80 -- -- -- 94    12/26/23 1315 -- -- 87 -- 132/61 -- 93    12/26/23 1300 -- -- 80 -- 135/79 -- 95    12/26/23 1213 98.2 (36.8) Oral 86 18 152/82 Sitting 98          Current Facility-Administered Medications   Medication Dose Route Frequency Provider Last Rate Last Admin    acetaminophen (TYLENOL) tablet 650 mg  650 mg Oral Q4H PRN Leslie Packer DO        Or    acetaminophen (TYLENOL) 160 MG/5ML oral solution 650 mg  650 mg Oral Q4H PRN Leslie Packer DO        Or    acetaminophen (TYLENOL) suppository 650 mg  650 mg Rectal Q4H PRN Leslie Packer DO        aluminum-magnesium hydroxide-simethicone (MAALOX MAX) 400-400-40 MG/5ML suspension 15 mL  15 mL Oral Q6H PRN Leslie Packer DO        sennosides-docusate (PERICOLACE) 8.6-50 MG per tablet 2 tablet  2 tablet Oral BID Leslie Packer DO   2 tablet at 12/27/23 0945    And    polyethylene glycol (MIRALAX) packet 17 g  17 g Oral Daily PRN Leslie Packer DO        And    bisacodyl (DULCOLAX) EC tablet 5 mg  5 mg Oral Daily PRN Leslie Packer DO        And    bisacodyl (DULCOLAX) suppository 10 mg  10 mg Rectal Daily PRN Leslie Packer DO        budesonide-formoterol (SYMBICORT) 160-4.5 MCG/ACT inhaler 2 puff  2 puff Inhalation BID - RT Leslie Packer DO   2 puff at 12/27/23 0644    ceFAZolin Sodium-Dextrose (ANCEF) IVPB (duplex) 2 g  2 g Intravenous Once Diego Oliveros MD        cefTRIAXone (ROCEPHIN) IVPB 1000 mg/50ml dextrose (premix)  1,000 mg Intravenous Q24H  Leslie Packer,  mL/hr at 12/27/23 0957 1,000 mg at 12/27/23 0957    clindamycin (CLEOCIN) 900 mg in dextrose 5% 50 mL IVPB (premix)  900 mg Intravenous Once Diego Oliveros MD        ethyl alcohol 62 % 2 each  2 swab  Nasal Once Diego Oliveros MD        HYDROcodone-acetaminophen (NORCO) 5-325 MG per tablet 1 tablet  1 tablet Oral Q4H PRN Leslie Packer, DO   1 tablet at 12/27/23 0945    ipratropium-albuterol (DUO-NEB) nebulizer solution 3 mL  3 mL Nebulization Q4H PRN Leslie Packer, DO        levothyroxine (SYNTHROID, LEVOTHROID) tablet 100 mcg  100 mcg Oral Q AM Leslie Packer, DO   100 mcg at 12/27/23 0637    melatonin tablet 5 mg  5 mg Oral Nightly PRN Leslie Packer, DO        montelukast (SINGULAIR) tablet 10 mg  10 mg Oral Nightly Leslie Packer, DO   10 mg at 12/26/23 2215    nitroglycerin (NITROSTAT) SL tablet 0.4 mg  0.4 mg Sublingual Q5 Min PRN Leslie Packer,         ondansetron ODT (ZOFRAN-ODT) disintegrating tablet 4 mg  4 mg Oral Q6H PRN Leslie Packer,         Or    ondansetron (ZOFRAN) injection 4 mg  4 mg Intravenous Q6H PRN Leslie Packer,         predniSONE (DELTASONE) tablet 40 mg  40 mg Oral Daily With Breakfast Leslie Packer, DO   40 mg at 12/27/23 0945    sodium chloride 0.9 % flush 10 mL  10 mL Intravenous PRN Leslie Packer,         sodium chloride 0.9 % flush 10 mL  10 mL Intravenous Q12H Leslie Packer, DO   10 mL at 12/27/23 0946    sodium chloride 0.9 % flush 10 mL  10 mL Intravenous PRN Leslie Packer, DO        sodium chloride 0.9 % infusion 40 mL  40 mL Intravenous PRN Leslie Packer,          Lab Results (last 24 hours)       Procedure Component Value Units Date/Time    Urine Culture - Urine, Urine, Clean Catch [[id number]] Collected: 12/26/23 1350    Specimen: Urine, Clean Catch Updated: 12/27/23 0811    Basic Metabolic Panel [[id number]]  (Abnormal)  "Collected: 12/27/23 0434    Specimen: Blood Updated: 12/27/23 0540     Glucose 162 mg/dL      BUN 48 mg/dL      Creatinine 2.77 mg/dL      Sodium 138 mmol/L      Potassium 4.9 mmol/L      Chloride 102 mmol/L      CO2 22.2 mmol/L      Calcium 9.2 mg/dL      BUN/Creatinine Ratio 17.3     Anion Gap 13.8 mmol/L      eGFR 21.1 mL/min/1.73     Narrative:      GFR Normal >60  Chronic Kidney Disease <60  Kidney Failure <15    The GFR formula is only valid for adults with stable renal function between ages 18 and 70.    CBC (No Diff) [[id number]]  (Abnormal) Collected: 12/27/23 0434    Specimen: Blood Updated: 12/27/23 0521     WBC 12.35 10*3/mm3      RBC 2.81 10*6/mm3      Hemoglobin 8.2 g/dL      Hematocrit 26.3 %      MCV 93.6 fL      MCH 29.2 pg      MCHC 31.2 g/dL      RDW 14.5 %      RDW-SD 50.0 fl      MPV 10.9 fL      Platelets 126 10*3/mm3     Procalcitonin [[id number]]  (Normal) Collected: 12/26/23 1216    Specimen: Blood Updated: 12/26/23 1521     Procalcitonin 0.07 ng/mL     Narrative:      As a Marker for Sepsis (Non-Neonates):    1. <0.5 ng/mL represents a low risk of severe sepsis and/or septic shock.  2. >2 ng/mL represents a high risk of severe sepsis and/or septic shock.    As a Marker for Lower Respiratory Tract Infections that require antibiotic therapy:    PCT on Admission    Antibiotic Therapy       6-12 Hrs later    >0.5                Strongly Recommended  >0.25 - <0.5        Recommended   0.1 - 0.25          Discouraged              Remeasure/reassess PCT  <0.1                Strongly Discouraged     Remeasure/reassess PCT    As 28 day mortality risk marker: \"Change in Procalcitonin Result\" (>80% or <=80%) if Day 0 (or Day 1) and Day 4 values are available. Refer to http://www.[employer]-pct-calculator.com    Change in PCT <=80%  A decrease of PCT levels below or equal to 80% defines a positive change in PCT test result representing a higher risk for 28-day all-cause mortality of patients diagnosed with " severe sepsis for septic shock.    Change in PCT >80%  A decrease of PCT levels of more than 80% defines a negative change in PCT result representing a lower risk for 28-day all-cause mortality of patients diagnosed with severe sepsis or septic shock.       High Sensitivity Troponin T 2Hr [[id number]]  (Abnormal) Collected: 12/26/23 1404    Specimen: Blood Updated: 12/26/23 1502     HS Troponin T 77 ng/L      Troponin T Delta -3 ng/L     Narrative:      High Sensitive Troponin T Reference Range:  <14.0 ng/L- Negative Female for AMI  <22.0 ng/L- Negative Male for AMI  >=14 - Abnormal Female indicating possible myocardial injury.  >=22 - Abnormal Male indicating possible myocardial injury.   Clinicians would have to utilize clinical acumen, EKG, Troponin, and serial changes to determine if it is an Acute Myocardial Infarction or myocardial injury due to an underlying chronic condition.         C-reactive Protein [[id number]]  (Normal) Collected: 12/26/23 1216    Specimen: Blood Updated: 12/26/23 1502     C-Reactive Protein <0.30 mg/dL     BNP [[id number]]  (Normal) Collected: 12/26/23 1216    Specimen: Blood Updated: 12/26/23 1458     proBNP 1,662.0 pg/mL     Narrative:      This assay is used as an aid in the diagnosis of individuals suspected of having heart failure. It can be used as an aid in the diagnosis of acute decompensated heart failure (ADHF) in patients presenting with signs and symptoms of ADHF to the emergency department (ED). In addition, NT-proBNP of <300 pg/mL indicates ADHF is not likely.    Age Range Result Interpretation  NT-proBNP Concentration (pg/mL:      <50             Positive            >450                   Gray                 300-450                    Negative             <300    50-75           Positive            >900                  Gray                300-900                  Negative            <300      >75             Positive            >1800                  Chris                 300-1800                  Negative            <300    Urinalysis, Microscopic Only - Urine, Clean Catch [[id number]]  (Abnormal) Collected: 12/26/23 1350    Specimen: Urine, Clean Catch Updated: 12/26/23 1426     RBC, UA None Seen /HPF      WBC, UA 6-10 /HPF      Bacteria, UA 2+ /HPF      Squamous Epithelial Cells, UA 3-6 /HPF      Comment: Some small clumps        Renal Epithelial Cells, UA 0-2 /HPF      Hyaline Casts, UA None Seen /LPF      Methodology Manual Light Microscopy    Urinalysis With Microscopic If Indicated (No Culture) - Urine, Clean Catch [[id number]]  (Abnormal) Collected: 12/26/23 1350    Specimen: Urine, Clean Catch Updated: 12/26/23 1404     Color, UA Yellow     Appearance, UA Cloudy     pH, UA 6.0     Specific Gravity, UA 1.012     Glucose, UA Negative     Ketones, UA Negative     Bilirubin, UA Negative     Blood, UA Negative     Protein, UA Negative     Leuk Esterase, UA Small (1+)     Nitrite, UA Negative     Urobilinogen, UA 0.2 E.U./dL    [place] [facility] [[id number]] Collected: 12/26/23 1216    Specimen: Blood Updated: 12/26/23 1330    Narrative:      The following orders were created for panel order [place] [facility].  Procedure                               Abnormality         Status                     ---------                               -----------         ------                     Green Top (Gel)[[id number]]                                  Final result               Lavender Top[[id number]]                                     Final result               Gold Top - SST[[id number]]                                   Final result               Light Blue Top[[id number]]                                   Final result                 Please view results for these tests on the individual orders.    Lavender Top [[id number]] Collected: 12/26/23 1216    Specimen: Blood Updated: 12/26/23 1330     Extra Tube hold for add-on     Comment: Auto resulted       Gold Top - SST [[id number]] Collected: 12/26/23 1216     Specimen: Blood Updated: 12/26/23 1330     Extra Tube Hold for add-ons.     Comment: Auto resulted.       Green Top (Gel) [[id number]] Collected: 12/26/23 1216    Specimen: Blood Updated: 12/26/23 1330     Extra Tube Hold for add-ons.     Comment: Auto resulted.       Light Blue Top [[id number]] Collected: 12/26/23 1216    Specimen: Blood Updated: 12/26/23 1330     Extra Tube Hold for add-ons.     Comment: Auto resulted       Blood Gas, Arterial With Co-Ox [[id number]]  (Abnormal) Collected: 12/26/23 1327    Specimen: Arterial Blood Updated: 12/26/23 1328     Site Right Radial     Domingo's Test N/A     pH, Arterial 7.409 pH units      pCO2, Arterial 38.7 mm Hg      pO2, Arterial 58.9 mm Hg      Comment: 84 Value below reference range        HCO3, Arterial 24.4 mmol/L      Base Excess, Arterial -0.2 mmol/L      Comment: 84 Value below reference range        O2 Saturation, Arterial 91.2 %      Comment: 84 Value below reference range        Hematocrit, Blood Gas 24.3 %      Comment: 84 Value below reference range        Oxyhemoglobin 89.2 %      Comment: 84 Value below reference range        Methemoglobin 0.50 %      Carboxyhemoglobin 1.7 %      A-a DO2 41.3 mmHg      Barometric Pressure for Blood Gas 732 mmHg      Modality Nasal Cannula     Flow Rate 4.0 lpm      Ventilator Mode NA     Collected by [id number]     Comment: Meter: [id number]     :  [id number]        pH, Temp Corrected --     pCO2, Temperature Corrected --     pO2, Temperature Corrected --    Single High Sensitivity Troponin T [[id number]]  (Abnormal) Collected: 12/26/23 1216    Specimen: Blood Updated: 12/26/23 1256     HS Troponin T 80 ng/L     Narrative:      High Sensitive Troponin T Reference Range:  <14.0 ng/L- Negative Female for AMI  <22.0 ng/L- Negative Male for AMI  >=14 - Abnormal Female indicating possible myocardial injury.  >=22 - Abnormal Male indicating possible myocardial injury.   Clinicians would have to utilize clinical acumen,  EKG, Troponin, and serial changes to determine if it is an Acute Myocardial Infarction or myocardial injury due to an underlying chronic condition.         COVID-19 and FLU A/B PCR, 1 HR TAT - Swab, Nasopharynx [[id number]]  (Normal) Collected: 12/26/23 1217    Specimen: Swab from Nasopharynx Updated: 12/26/23 1254     COVID19 Not Detected     Influenza A PCR Not Detected     Influenza B PCR Not Detected    Narrative:      Fact sheet for providers: https://www.fda.gov/media/123268/download    Fact sheet for patients: https://www.fda.gov/media/014075/download    Test performed by PCR.    Comprehensive Metabolic Panel [[id number]]  (Abnormal) Collected: 12/26/23 1216    Specimen: Blood Updated: 12/26/23 1240     Glucose 127 mg/dL      BUN 44 mg/dL      Creatinine 2.68 mg/dL      Sodium 139 mmol/L      Potassium 5.0 mmol/L      Chloride 101 mmol/L      CO2 24.8 mmol/L      Calcium 9.5 mg/dL      Total Protein 8.1 g/dL      Albumin 4.4 g/dL      ALT (SGPT) 11 U/L      AST (SGOT) 19 U/L      Alkaline Phosphatase 103 U/L      Total Bilirubin 0.7 mg/dL      Globulin 3.7 gm/dL      A/G Ratio 1.2 g/dL      BUN/Creatinine Ratio 16.4     Anion Gap 13.2 mmol/L      eGFR 21.9 mL/min/1.73     Narrative:      GFR Normal >60  Chronic Kidney Disease <60  Kidney Failure <15    The GFR formula is only valid for adults with stable renal function between ages 18 and 70.    Magnesium [[id number]]  (Normal) Collected: 12/26/23 1216    Specimen: Blood Updated: 12/26/23 1240     Magnesium 2.4 mg/dL     CBC & Differential [[id number]]  (Abnormal) Collected: 12/26/23 1216    Specimen: Blood Updated: 12/26/23 1223    Narrative:      The following orders were created for panel order CBC & Differential.  Procedure                               Abnormality         Status                     ---------                               -----------         ------                     CBC Auto Differential[[id number]]        Abnormal            Final result                  Please view results for these tests on the individual orders.    CBC Auto Differential [[id number]]  (Abnormal) Collected: 12/26/23 1216    Specimen: Blood Updated: 12/26/23 1223     WBC 10.49 10*3/mm3      RBC 3.04 10*6/mm3      Hemoglobin 8.7 g/dL      Hematocrit 27.9 %      MCV 91.8 fL      MCH 28.6 pg      MCHC 31.2 g/dL      RDW 14.4 %      RDW-SD 48.6 fl      MPV 9.6 fL      Platelets 128 10*3/mm3      Neutrophil % 63.5 %      Lymphocyte % 9.8 %      Monocyte % 5.8 %      Eosinophil % 20.0 %      Basophil % 0.4 %      Immature Grans % 0.5 %      Neutrophils, Absolute 6.66 10*3/mm3      Lymphocytes, Absolute 1.03 10*3/mm3      Monocytes, Absolute 0.61 10*3/mm3      Eosinophils, Absolute 2.10 10*3/mm3      Basophils, Absolute 0.04 10*3/mm3      Immature Grans, Absolute 0.05 10*3/mm3      nRBC 0.0 /100 WBC           Physician Progress Notes (last 24 hours)  Notes from 12/26/23 1042 through 12/27/23 1042   No notes of this type exist for this encounter.

## 2023-12-27 NOTE — CASE MANAGEMENT/SOCIAL WORK
Spoke with Christina Chew who will review referral and is aware of surgery pending- she will be out of office tomorrow.

## 2023-12-28 LAB
ANION GAP SERPL CALCULATED.3IONS-SCNC: 14.1 MMOL/L (ref 5–15)
B PARAPERT DNA SPEC QL NAA+PROBE: NOT DETECTED
B PERT DNA SPEC QL NAA+PROBE: NOT DETECTED
BACTERIA SPEC AEROBE CULT: NO GROWTH
BUN SERPL-MCNC: 68 MG/DL (ref 8–23)
BUN/CREAT SERPL: 21.9 (ref 7–25)
C PNEUM DNA NPH QL NAA+NON-PROBE: NOT DETECTED
CALCIUM SPEC-SCNC: 8.5 MG/DL (ref 8.2–9.6)
CHLORIDE SERPL-SCNC: 101 MMOL/L (ref 98–107)
CK SERPL-CCNC: 594 U/L (ref 20–200)
CO2 SERPL-SCNC: 20.9 MMOL/L (ref 22–29)
CREAT SERPL-MCNC: 3.11 MG/DL (ref 0.76–1.27)
DEPRECATED RDW RBC AUTO: 50.9 FL (ref 37–54)
EGFRCR SERPLBLD CKD-EPI 2021: 18.3 ML/MIN/1.73
ERYTHROCYTE [DISTWIDTH] IN BLOOD BY AUTOMATED COUNT: 15 % (ref 12.3–15.4)
FLUAV SUBTYP SPEC NAA+PROBE: NOT DETECTED
FLUBV RNA ISLT QL NAA+PROBE: NOT DETECTED
GEN 5 2HR TROPONIN T REFLEX: 113 NG/L
GLUCOSE BLDC GLUCOMTR-MCNC: 101 MG/DL (ref 70–130)
GLUCOSE BLDC GLUCOMTR-MCNC: 128 MG/DL (ref 70–130)
GLUCOSE SERPL-MCNC: 158 MG/DL (ref 65–99)
HADV DNA SPEC NAA+PROBE: NOT DETECTED
HCOV 229E RNA SPEC QL NAA+PROBE: NOT DETECTED
HCOV HKU1 RNA SPEC QL NAA+PROBE: NOT DETECTED
HCOV NL63 RNA SPEC QL NAA+PROBE: NOT DETECTED
HCOV OC43 RNA SPEC QL NAA+PROBE: NOT DETECTED
HCT VFR BLD AUTO: 25.1 % (ref 37.5–51)
HGB BLD-MCNC: 8 G/DL (ref 13–17.7)
HMPV RNA NPH QL NAA+NON-PROBE: NOT DETECTED
HPIV1 RNA ISLT QL NAA+PROBE: NOT DETECTED
HPIV2 RNA SPEC QL NAA+PROBE: NOT DETECTED
HPIV3 RNA NPH QL NAA+PROBE: NOT DETECTED
HPIV4 P GENE NPH QL NAA+PROBE: NOT DETECTED
LYMPHOCYTES # BLD MANUAL: 1.09 10*3/MM3 (ref 0.7–3.1)
M PNEUMO IGG SER IA-ACNC: NOT DETECTED
MCH RBC QN AUTO: 29.9 PG (ref 26.6–33)
MCHC RBC AUTO-ENTMCNC: 31.9 G/DL (ref 31.5–35.7)
MCV RBC AUTO: 93.7 FL (ref 79–97)
NEUTROPHILS # BLD AUTO: 8.01 10*3/MM3 (ref 1.7–7)
NEUTROPHILS NFR BLD MANUAL: 72 % (ref 42.7–76)
NEUTS BAND NFR BLD MANUAL: 16 % (ref 0–5)
PLATELET # BLD AUTO: 128 10*3/MM3 (ref 140–450)
PMV BLD AUTO: 10.8 FL (ref 6–12)
POTASSIUM SERPL-SCNC: 5.3 MMOL/L (ref 3.5–5.2)
RBC # BLD AUTO: 2.68 10*6/MM3 (ref 4.14–5.8)
RBC MORPH BLD: NORMAL
RHINOVIRUS RNA SPEC NAA+PROBE: NOT DETECTED
RSV RNA NPH QL NAA+NON-PROBE: NOT DETECTED
SARS-COV-2 RNA NPH QL NAA+NON-PROBE: NOT DETECTED
SCAN SLIDE: NORMAL
SMALL PLATELETS BLD QL SMEAR: ADEQUATE
SMALL PLATELETS BLD QL SMEAR: ADEQUATE
SODIUM SERPL-SCNC: 136 MMOL/L (ref 136–145)
TROPONIN T DELTA: 2 NG/L
TROPONIN T SERPL HS-MCNC: 132 NG/L
VARIANT LYMPHS NFR BLD MANUAL: 12 % (ref 19.6–45.3)
WBC MORPH BLD: NORMAL
WBC NRBC COR # BLD AUTO: 9.1 10*3/MM3 (ref 3.4–10.8)

## 2023-12-28 PROCEDURE — 25010000002 PIPERACILLIN SOD-TAZOBACTAM PER 1 G: Performed by: FAMILY MEDICINE

## 2023-12-28 PROCEDURE — 94799 UNLISTED PULMONARY SVC/PX: CPT

## 2023-12-28 PROCEDURE — 80048 BASIC METABOLIC PNL TOTAL CA: CPT | Performed by: FAMILY MEDICINE

## 2023-12-28 PROCEDURE — 82550 ASSAY OF CK (CPK): CPT | Performed by: INTERNAL MEDICINE

## 2023-12-28 PROCEDURE — 25010000002 MORPHINE PER 10 MG: Performed by: FAMILY MEDICINE

## 2023-12-28 PROCEDURE — 92610 EVALUATE SWALLOWING FUNCTION: CPT

## 2023-12-28 PROCEDURE — 84484 ASSAY OF TROPONIN QUANT: CPT | Performed by: STUDENT IN AN ORGANIZED HEALTH CARE EDUCATION/TRAINING PROGRAM

## 2023-12-28 PROCEDURE — 25010000002 METHYLPREDNISOLONE PER 125 MG: Performed by: FAMILY MEDICINE

## 2023-12-28 PROCEDURE — 0202U NFCT DS 22 TRGT SARS-COV-2: CPT | Performed by: STUDENT IN AN ORGANIZED HEALTH CARE EDUCATION/TRAINING PROGRAM

## 2023-12-28 PROCEDURE — 85007 BL SMEAR W/DIFF WBC COUNT: CPT | Performed by: FAMILY MEDICINE

## 2023-12-28 PROCEDURE — 87040 BLOOD CULTURE FOR BACTERIA: CPT | Performed by: STUDENT IN AN ORGANIZED HEALTH CARE EDUCATION/TRAINING PROGRAM

## 2023-12-28 PROCEDURE — 99232 SBSQ HOSP IP/OBS MODERATE 35: CPT | Performed by: FAMILY MEDICINE

## 2023-12-28 PROCEDURE — 82948 REAGENT STRIP/BLOOD GLUCOSE: CPT

## 2023-12-28 PROCEDURE — 94664 DEMO&/EVAL PT USE INHALER: CPT

## 2023-12-28 PROCEDURE — 85025 COMPLETE CBC W/AUTO DIFF WBC: CPT | Performed by: FAMILY MEDICINE

## 2023-12-28 PROCEDURE — 25810000003 SODIUM CHLORIDE 0.9 % SOLUTION: Performed by: INTERNAL MEDICINE

## 2023-12-28 PROCEDURE — 94761 N-INVAS EAR/PLS OXIMETRY MLT: CPT

## 2023-12-28 RX ORDER — MORPHINE SULFATE 2 MG/ML
2 INJECTION, SOLUTION INTRAMUSCULAR; INTRAVENOUS
Status: DISCONTINUED | OUTPATIENT
Start: 2023-12-28 | End: 2023-12-29 | Stop reason: HOSPADM

## 2023-12-28 RX ORDER — METHYLPREDNISOLONE SODIUM SUCCINATE 125 MG/2ML
60 INJECTION, POWDER, LYOPHILIZED, FOR SOLUTION INTRAMUSCULAR; INTRAVENOUS EVERY 12 HOURS
Status: DISCONTINUED | OUTPATIENT
Start: 2023-12-28 | End: 2023-12-29 | Stop reason: HOSPADM

## 2023-12-28 RX ORDER — MIDODRINE HYDROCHLORIDE 5 MG/1
5 TABLET ORAL
Status: DISCONTINUED | OUTPATIENT
Start: 2023-12-28 | End: 2023-12-29 | Stop reason: HOSPADM

## 2023-12-28 RX ORDER — SODIUM CHLORIDE 9 MG/ML
60 INJECTION, SOLUTION INTRAVENOUS CONTINUOUS
Status: ACTIVE | OUTPATIENT
Start: 2023-12-28 | End: 2023-12-29

## 2023-12-28 RX ADMIN — PIPERACILLIN SODIUM AND TAZOBACTAM SODIUM 3.38 G: 3; .375 INJECTION, SOLUTION INTRAVENOUS at 08:11

## 2023-12-28 RX ADMIN — ACETAMINOPHEN 650 MG: 650 SUPPOSITORY RECTAL at 04:26

## 2023-12-28 RX ADMIN — SODIUM CHLORIDE 60 ML/HR: 9 INJECTION, SOLUTION INTRAVENOUS at 09:58

## 2023-12-28 RX ADMIN — PIPERACILLIN SODIUM AND TAZOBACTAM SODIUM 3.38 G: 3; .375 INJECTION, SOLUTION INTRAVENOUS at 15:44

## 2023-12-28 RX ADMIN — SODIUM CHLORIDE 60 ML/HR: 9 INJECTION, SOLUTION INTRAVENOUS at 23:19

## 2023-12-28 RX ADMIN — BUDESONIDE AND FORMOTEROL FUMARATE DIHYDRATE 2 PUFF: 160; 4.5 AEROSOL RESPIRATORY (INHALATION) at 07:15

## 2023-12-28 RX ADMIN — BUDESONIDE AND FORMOTEROL FUMARATE DIHYDRATE 2 PUFF: 160; 4.5 AEROSOL RESPIRATORY (INHALATION) at 22:09

## 2023-12-28 RX ADMIN — IPRATROPIUM BROMIDE AND ALBUTEROL SULFATE 3 ML: .5; 3 SOLUTION RESPIRATORY (INHALATION) at 17:15

## 2023-12-28 RX ADMIN — MORPHINE SULFATE 2 MG: 2 INJECTION, SOLUTION INTRAMUSCULAR; INTRAVENOUS at 16:36

## 2023-12-28 RX ADMIN — Medication 10 ML: at 08:11

## 2023-12-28 RX ADMIN — METHYLPREDNISOLONE SODIUM SUCCINATE 60 MG: 125 INJECTION, POWDER, FOR SOLUTION INTRAMUSCULAR; INTRAVENOUS at 17:51

## 2023-12-28 NOTE — PROGRESS NOTES
"    [facility]    Name:  Jonathan Brown   Age:  89 y.o.  Sex:  male  :  1933  MRN:  [id number]   Visit Number:  [id number]  Admission Date:  2023  Date Of Service:  23  Primary Care Physician:  Sri Bangura MD     LOS: 2 days :    Chief Complaint:      Follow-up hip fracture, hypoxia    Subjective:    Very difficult situation currently.  At time of visit the patient was alert and able to follow commands, his speech is difficult to understand as he did not have teeth in.  He did appear labored at rest.  He reiterated to me that he did not want hip surgery, did not want a feeding tube, did not want to go to a nursing home.  No family was currently at bedside.  I did talk with the patient's daughter over the phone who is one of his power of 's.  I noted his current condition, his refusal of many things, and recommendations to talk with palliative care about their goals moving forward.  She was in agreement.    Hospital Course:    The patient is a chronically ill 89-year-old with medical history of chronic kidney disease stage IV, hypertension, hyperlipidemia, COPD with chronic respiratory failure, hypothyroidism, sick sinus syndrome status post pacemaker, paroxysmal atrial fibrillation, impaired mobility and ADLs, hearing loss, who had presented from home after sustaining a fall.  History obtained from ER record, patient at bedside.  No family available.  He did note he had been \"sick\" for a few days, somewhat short of breath with a cough.  Notes that earlier this morning while ambulating he had felt lightheaded, subsequently fell and landed on his right hip and lower leg.  Unable to ambulate thereafter.  He notes he does have COPD, has not smoked in many years.  Does use inhalers at home.  Notes sister helps him some, is his POA, also has a daughter.     In the ER, he was normotensive, heart rate in the 80s, afebrile.  Saturating 95% on 4 L " of oxygen.  White count 10 hemoglobin 8.7 platelets 128.  Procalcitonin 0.07 proBNP 1600 high-sensitivity troponin of 80.  Creatinine 2.68 potassium 5 negative flu COVID test.  ABG pH 7.409 pCO2 38 pO2 58.  Urinalysis with small leukocyte esterase and 2+ bacteria.  CT cervical spine without contrast unremarkable.  CT scan of the head without contrast unremarkable.  3 view right knee x-ray unremarkable.  Bilateral hip x-rays with evidence of a subcapital right femoral neck fracture with displacement noted.  Chest x-ray with chronic findings.  The patient was given pain control, bronchodilators, steroids.  Dr. Oliveros with orthopedics to see in consult.  We were asked admit thereafter.    The patient had had increasing oxygen requirements, after speech therapy evaluation he had evidence of severe aspiration and was made NPO.  Did spike fever overnight of 12/27/2023, was increasingly hypoxic and given diuretics and additional breathing treatments.  Was started on empiric antibiotics with Zosyn.    The patient's kidney function has also decreased while in the hospital.  Nephrology has been following.  In addition, his hip fracture surgery has been placed on hold as patient has yet to decide on this.  The patient does not wish to go to a nursing home facility, he does not wish to have a feeding tube placed, and overall appears fairly restless and miserable.  Goals of care discussion have been continued and palliative care has been consulted.    Review of Systems:     All systems were reviewed and negative except as mentioned in subjective, assessment and plan.    Vital Signs:    Temp:  [97.6 °F (36.4 °C)-100.8 °F (38.2 °C)] 97.6 °F (36.4 °C)  Heart Rate:  [] 73  Resp:  [12-24] 12  BP: ()/(52-89) 120/65    Intake and output:    I/O last 3 completed shifts:  In: 1027 [P.O.:460; I.V.:567]  Out: 2070 [Urine:2070]  I/O this shift:  In: 182 [I.V.:182]  Out: 200 [Urine:200]    Physical Examination:    General  Appearance:  Alert and cooperative.  Chronically ill-appearing   Head:  Atraumatic and normocephalic.   Eyes: Conjunctivae and sclerae normal, no icterus. No pallor.   Throat: No oral lesions, no thrush, oral mucosa moist.   Neck: Supple, trachea midline, no thyromegaly.   Lungs:   Diffuse rhonchi present.  High flow oxygen in place.   Heart:  Normal S1 and S2, no murmur, no gallop, no rub. No JVD.   Abdomen:   Normal bowel sounds, no masses, no organomegaly. Soft, nontender, nondistended, no rebound tenderness.   Extremities: Right hip externally rotated.  Edematous   Skin: No bleeding or rash.   Neurologic: Alert and oriented x 3. No facial asymmetry. Moves all four limbs. No tremors.  Weakness, hard of hearing     Laboratory results:    Results from last 7 days   Lab Units 12/28/23  0434 12/27/23 0434 12/26/23  1216   SODIUM mmol/L 136 138 139   POTASSIUM mmol/L 5.3* 4.9 5.0   CHLORIDE mmol/L 101 102 101   CO2 mmol/L 20.9* 22.2 24.8   BUN mg/dL 68* 48* 44*   CREATININE mg/dL 3.11* 2.77* 2.68*   CALCIUM mg/dL 8.5 9.2 9.5   BILIRUBIN mg/dL  --   --  0.7   ALK PHOS U/L  --   --  103   ALT (SGPT) U/L  --   --  11   AST (SGOT) U/L  --   --  19   GLUCOSE mg/dL 158* 162* 127*     Results from last 7 days   Lab Units 12/28/23  0434 12/27/23  0434 12/26/23  1216   WBC 10*3/mm3 9.10 12.35* 10.49   HEMOGLOBIN g/dL 8.0* 8.2* 8.7*   HEMATOCRIT % 25.1* 26.3* 27.9*   PLATELETS 10*3/mm3 128* 126* 128*         Results from last 7 days   Lab Units 12/28/23  0632 12/28/23  0434 12/27/23  2312 12/27/23  2052   CK TOTAL U/L  --  594*  --   --    HSTROP T ng/L 132*  --  113* 111*     Results from last 7 days   Lab Units 12/26/23  1350   URINECX  No growth     Recent Labs     12/26/23  1327   PHART 7.409   [id number] 38.7   PO2ART 58.9*   [id number] 24.4   BASEEXCESS -0.2*      I have reviewed the patient's laboratory results.    Radiology results:    XR Chest 1 View    Result Date: 12/27/2023  FINAL REPORT TECHNIQUE: A single view of the  chest was obtained. CLINICAL HISTORY: low sats FINDINGS: There is a left chest wall implanted cardiac device.  There is pulmonary venous congestion with diffuse interstitial opacities. There is no pneumothorax or significant pleural effusion. There is borderline cardiomegaly.     Impression: Lung findings could represent pulmonary edema or viral pneumonia. Authenticated and Electronically Signed by Garo Black MD on 12/27/2023 09:53:39 PM    CT Cervical Spine Without Contrast    Result Date: 12/26/2023  PROCEDURE: CT CERVICAL SPINE WO CONTRAST-  HISTORY: Neck trauma (Age >= 65y), neck pain  TECHNIQUE: Thin section axial CT with sagittal and coronal reconstructions  FINDINGS: No fracture is present. Alignment is normal.  No bony canal stenosis is seen. Mild diffuse degenerative disc disease is present. Mild facet arthropathy is noted.      Impression: Negative CT evaluation of the cervical spine for acute bony injury.    This study was performed with techniques to keep radiation doses as low as reasonably achievable (ALARA). Individualized dose reduction techniques using automated exposure control or adjustment of vA and/or kV according to the patient size were employed.  This report was signed and finalized on 12/26/2023 2:34 PM by Niels Sierra MD.      CT Head Without Contrast    Result Date: 12/26/2023  PROCEDURE: CT HEAD WO CONTRAST-  HISTORY: Head trauma, minor (Age >= 65y), pain  COMPARISON: 7/15/2022  TECHNIQUE: Noncontrast exam  FINDINGS: Advanced atrophy and chronic ischemic white matter changes are noted.  No cortical edema is present. There is no mass or hemorrhage. Ventricles are normal.  Bone windows show no skull fracture or obvious destructive lesion.      Impression: 1. No acute intracranial abnormality or obvious mass. 2. Atrophy and chronic ischemic white matter changes as above.   This study was performed with techniques to keep radiation doses as low as reasonably achievable (ALARA).  Individualized dose reduction techniques using automated exposure control or adjustment of vA and/or kV according to the patient size were employed.    This report was signed and finalized on 12/26/2023 2:31 PM by Niels Sierra MD.     I have reviewed the patient's radiology reports.    Medication Review:     I have reviewed the patient's active and prn medications.     Problem List:      Acute hypoxic respiratory failure    Severe malnutrition      Assessment:    Right femoral neck fracture, POA  Acute on chronic hypoxic respiratory failure, POA   COPD with exacerbation  History of sick sinus syndrome status post pacemaker  Atrial fibrillation on Eliquis  Hearing loss  Acute on chronic kidney disease stage IV    Plan:    Hip fracture:  The patient has been seen by orthopedics in consultation with recommendations for hip fracture surgery.  The patient has reiterated that he does not want surgery a couple times now, again today.  The patient also thinks he can bear weight on this and will be fine letting it heal on its own.  I discussed he cannot take care of himself at home in his current condition especially with a hip fracture, however he does not wish to go to a nursing facility and family would not be able to provide level of care.     COPD/aspiration:  The patient did have evidence of symptoms of COPD exacerbation prior to his fall, however after speech therapy evaluation, likely patient is aspirating.  She was concerned and did recommend an MBS, noted this would likely be abnormal and alternative ways to feed him including feeding tube/PEG tube would probably be recommended.  I discussed this with the patient who notes he would refuse that if necessary.     Sick sinus syndrome:  Follows with Dr. Haro.  Has pacemaker, also with atrial fibrillation chronically on Eliquis.  Preop cardiology evaluation.     Renal failure:  Unfortunately kidney function has worsened.  Likely due to hypotension, possible shock,  diuretic effect.  Nephrology following to give back some fluids today Lemons catheter remains in place.    Overall, situation remains fairly complicated.  Patient has refused surgical intervention of the hip, alternative feeding methods.  He reiterated he can take care of all the stuff at home, however obviously he cannot.  Daughter was updated today, going to talk with the other POA is Sister and palliative care.    DVT Prophylaxis: SCDs  Code Status: DNR  Diet: N.p.o.  Discharge Plan: LUCRECIA Packer DO  12/28/23  13:59 EST    Dictated utilizing Dragon dictation.

## 2023-12-28 NOTE — CASE MANAGEMENT/SOCIAL WORK
Case Management/Social Work    Patient Name:  Jonathan Brown  YOB: 1933  MRN: [id number]  Admit Date:  12/26/2023        08:59 EST  Patient resting in bed. Liaison at [facility] is out of office today. Pending placement with [facility].    14:45 EST  LTC with hospice referrals made to [facility].      Electronically signed by:  Ilan Najera RN  12/28/23 08:59 EST

## 2023-12-28 NOTE — PROGRESS NOTES
Acute Care - Speech Language Pathology   Swallow Initial Evaluation  Marc[facility]     Patient Name: Jonathan Brown  : 1933  MRN: [id number]  Today's Date: 2023               Admit Date: 2023    Visit Dx:     ICD-10-CM ICD-9-CM   1. COPD exacerbation  J44.1 491.21   2. Closed fracture of neck of right femur, initial encounter  S72.001A 820.8     Patient Active Problem List   Diagnosis    AAA (abdominal aortic aneurysm)    SSS (sick sinus syndrome)    Stage 3 chronic kidney disease    Hyperlipidemia    Primary hypertension    AF (paroxysmal atrial fibrillation)    Epigastric pain    Nausea    Weight loss    Anemia    Constipation    Acute hypoxic respiratory failure    Severe malnutrition     Past Medical History:   Diagnosis Date    Anemia     Arthritis     Asthma     Blood clotting disorder     COPD (chronic obstructive pulmonary disease)     H/O emphysema     Hiatal hernia     History of blood transfusion     Hyperlipidemia     Hypertension     Kidney infection     Kidney stones     Positive TB test     Thyroid disease      Past Surgical History:   Procedure Laterality Date    ABDOMINAL AORTIC ANEURYSM REPAIR      CARDIAC ELECTROPHYSIOLOGY PROCEDURE N/A 2019    Procedure: Device Implant;  Surgeon: Dom Gallardo MD;  Location: [facility] CATH INVASIVE LOCATION;  Service: Cardiology    HERNIA REPAIR      KNEE SURGERY         SLP Recommendation and Plan           Recommend NPO until MBS; discussed w/MD, who reports pt will likely receive palliative care consult.   POC pending          Plan of Care Reviewed With: patient      SWALLOW EVALUATION (last 72 hours)       SLP Adult Swallow Evaluation       Row Name 23 1210                   Rehab Evaluation    Document Type evaluation  -CJ        Patient Observations alert;cooperative  -        Patient/Family/Caregiver Comments/Observations --  family present at bedside  -        Comment Pt reports he has been having difficulty  swallowing, will agree to an instrumental eval, but will not agree to rehab and plans to go home at discharge.  -        Oral Care --  pt eating lunch  -           General Information    Patient Profile Reviewed yes  -        Current Method of Nutrition regular textures;thin liquids  RN currently thickening liquids  -        Prior Level of Function-Swallowing --  regular diet, pt c/o some difficulty swallow prior to admission  -        Barriers to Rehab resistant to information  -        Patient's Goals for Discharge return home  -           SLP Evaluation Clinical Impression    SLP Swallowing Diagnosis suspected pharyngeal dysphagia  -        Functional Impact risk of aspiration/pneumonia  -           Recommendations    SLP Diet Recommendation NPO;ice chips between meals after oral care, with supervision  -        Recommended Diagnostics [facility] ([facility]  -                  User Key  (r) = Recorded By, (t) = Taken By, (c) = Cosigned By      Initials Name Effective Dates    Donna Lozano, SLP 07/26/23 -                     EDUCATION  The patient has been educated in the following areas:   Dysphagia (Swallowing Impairment) NPO rationale.              Time Calculation:                ANTONIO Evans  12/27/2023

## 2023-12-28 NOTE — DISCHARGE PLACEMENT REQUEST
"LTC with hospice request:    Catia Brown (89 y.o. Male)       Date of Birth   02/13/1933    Social Security Number       Address   [address] [id number]    Home Phone   [phone]    MRN   [id number]       [facility]    Marital Status                               Admission Date   12/26/23    Admission Type   Emergency    Admitting Provider   Leslie Packer DO    Attending Provider   Leslie Packer DO    Department, Room/Bed   [facility] INTENSIVE CARE, I04/1       Discharge Date       Discharge Disposition       Discharge Destination                                 Attending Provider: Leslie Packer DO    Allergies: Atorvastatin    Isolation: None   Infection: None   Code Status: No CPR    Ht: 180.3 cm (71\")   Wt: 68.1 kg (150 lb 2.1 oz)    Admission Cmt: None   Principal Problem: Acute hypoxic respiratory failure [J96.01]                   Active Insurance as of 12/26/2023       Primary Coverage       Payor Plan Insurance Group Employer/Plan Group    HUMANA MEDICARE REPLACEMENT HUMANA MEDICARE REPLACEMENT [id number]       Payor Plan Address Payor Plan Phone Number Payor Plan Fax Number Effective Dates    [address] [address] [phone]  1/1/2019 - None Entered    [facility] [address]         Subscriber Name Subscriber Birth Date Member ID       CATIA BROWN 2/13/1933 [id number]               Secondary Coverage       Payor Plan Insurance Group Employer/Plan Group    [place]        Payor Plan Address Payor Plan Phone Number Payor Plan Fax Number Effective Dates    [address] [phone]  7/7/2016 - None Entered    [facility] [id number]         Subscriber Name Subscriber Birth Date Member ID       CATIA BROWN 2/13/1933 [id number]                     Emergency Contacts        (Rel.) Home Phone Work Phone Mobile Phone    cristhianjoan (Sister) -- -- [phone]    " "Kaci Montanez (Daughter) [phone] -- [phone]                 History & Physical        Leslie Packer, DO at 23 1709            [facility]   [facility]ORY [facility]ICAL      Name:  Jonathan Brown   Age:  89 y.o.  Sex:  male  :  1933  MRN:  [id number]   Visit Number:  [id number]  Admission Date:  2023  Date Of Service:  23  Primary Care Physician:  Sri Bangura MD    Chief Complaint:     Fall, hip pain    History Of Presenting Illness:      The patient is a chronically ill 89-year-old with medical history of chronic kidney disease stage IV, hypertension, hyperlipidemia, COPD with chronic respiratory failure, hypothyroidism, sick sinus syndrome status post pacemaker, paroxysmal atrial fibrillation, impaired mobility and ADLs, hearing loss, who had presented from home after sustaining a fall.  History obtained from ER record, patient at bedside.  No family available.  He did note he had been \"sick\" for a few days, somewhat short of breath with a cough.  Notes that earlier this morning while ambulating he had felt lightheaded, subsequently fell and landed on his right hip and lower leg.  Unable to ambulate thereafter.  He notes he does have COPD, has not smoked in many years.  Does use inhalers at home.  Notes sister helps him some, is his POA, also has a daughter.    In the ER, he was normotensive, heart rate in the 80s, afebrile.  Saturating 95% on 4 L of oxygen.  White count 10 hemoglobin 8.7 platelets 128.  Procalcitonin 0.07 proBNP 1600 high-sensitivity troponin of 80.  Creatinine 2.68 potassium 5 negative flu COVID test.  ABG pH 7.409 pCO2 38 pO2 58.  Urinalysis with small leukocyte esterase and 2+ bacteria.  CT cervical spine without contrast unremarkable.  CT scan of the head without contrast unremarkable.  3 view right knee x-ray unremarkable.  Bilateral hip x-rays with evidence of a subcapital right femoral neck fracture with displacement " noted.  Chest x-ray with chronic findings.  The patient was given pain control, bronchodilators, steroids.  Dr. Oliveros with orthopedics to see in consult.  We were asked admit thereafter.    Review Of Systems:    All systems were reviewed and negative except as mentioned in history of presenting illness, assessment and plan.    Past Medical History: Patient  has a past medical history of Anemia, Arthritis, Asthma, Blood clotting disorder, COPD (chronic obstructive pulmonary disease), H/O emphysema, Hiatal hernia, History of blood transfusion, Hyperlipidemia, Hypertension, Kidney infection, Kidney stones, Positive TB test, and Thyroid disease.    Past Surgical History: Patient  has a past surgical history that includes Hernia repair; Knee surgery; AAA repair, open; and Cardiac electrophysiology procedure (N/A, 4/8/2019).    Social History: Patient  reports that he quit smoking about 25 years ago. His smoking use included cigarettes. He has a 45.00 pack-year smoking history. He has been exposed to tobacco smoke. He has never used smokeless tobacco. He reports that he does not drink alcohol and does not use drugs.    Family History:  Patient's family history has been reviewed and found to be noncontributory.     Allergies:      Atorvastatin    Home Medications:    Prior to Admission Medications       Prescriptions Last Dose Informant Patient Reported? Taking?    amLODIPine (NORVASC) 5 MG tablet   Yes No    Take 1 tablet by mouth Daily.    apixaban (ELIQUIS) 2.5 MG tablet tablet   Yes No    Take 1 tablet by mouth Every 12 (Twelve) Hours.    aspirin 81 MG EC tablet   Yes No    Take 1 tablet by mouth Daily.    Cyanocobalamin (VITAMIN B-12 PO)   Yes No    Take  by mouth.    doxazosin (CARDURA) 4 MG tablet   No No    Take 1 tablet by mouth Every Night.    ferrous sulfate 325 (65 FE) MG tablet   Yes No    Take 1 tablet by mouth Daily With Breakfast.    finasteride (PROSCAR) 5 MG tablet   No No    Take 1 tablet by mouth Daily.     irbesartan (AVAPRO) 150 MG tablet   Yes No    irbesartan 150 mg tablet   TAKE 1 TABLET EVERY DAY    irbesartan (AVAPRO) 300 MG tablet   Yes No    Take 1 tablet by mouth Every Night.    levothyroxine (SYNTHROID, LEVOTHROID) 100 MCG tablet   Yes No    levothyroxine 100 mcg tablet   TAKE 1 TABLET EVERY DAY    montelukast (SINGULAIR) 10 MG tablet   Yes No    montelukast 10 mg tablet    Multiple Vitamins-Minerals (PRESERVISION AREDS 2 PO)   Yes No    Take  by mouth.    omeprazole (priLOSEC) 20 MG capsule   Yes No    Take 1 capsule by mouth Daily.    ondansetron (ZOFRAN) 4 MG tablet   No No    Take 1 tablet by mouth Every 8 (Eight) Hours As Needed for Nausea or Vomiting.    Polyethylene Glycol powder   No No    Take 1 cap full (17 grams) in 8 oz of noncarbonated beverage and drink once daily    simvastatin (ZOCOR) 40 MG tablet   Yes No    simvastatin 40 mg tablet    tamsulosin (FLOMAX) 0.4 MG capsule 24 hr capsule   Yes No    tamsulosin 0.4 mg capsule    tiotropium (SPIRIVA) 18 MCG per inhalation capsule   Yes No    Spiriva with HandiHaler 18 mcg and inhalation capsules          ED Medications:    Medications   sodium chloride 0.9 % flush 10 mL (has no administration in time range)   Morphine sulfate (PF) injection 2 mg (2 mg Intravenous Given 12/26/23 1304)   ondansetron (ZOFRAN) injection 4 mg (4 mg Intravenous Given 12/26/23 1303)   methylPREDNISolone sodium succinate (SOLU-Medrol) injection 125 mg (125 mg Intravenous Given 12/26/23 1435)   ipratropium-albuterol (DUO-NEB) nebulizer solution 3 mL (3 mL Nebulization Given 12/26/23 1439)   magnesium sulfate 2g/50 mL (PREMIX) infusion (0 g Intravenous Stopped 12/26/23 1529)   morphine injection 4 mg (4 mg Intravenous Given 12/26/23 1438)     Vital Signs:  Temp:  [98.2 °F (36.8 °C)] 98.2 °F (36.8 °C)  Heart Rate:  [77-98] 89  Resp:  [18] 18  BP: ()/(55-83) 109/63        12/26/23  1213   Weight: 64.4 kg (142 lb)     Body mass index is 19.8 kg/m².    Physical Exam:  "    Most recent vital Signs: /63   Pulse 89   Temp 98.2 °F (36.8 °C) (Oral)   Resp 18   Ht 180.3 cm (71\")   Wt 64.4 kg (142 lb)   SpO2 92%   BMI 19.80 kg/m²     Physical Exam  Constitutional:       Appearance: He is ill-appearing.   HENT:      Head: Normocephalic.      Ears:      Comments: Hard of hearing  Eyes:      Extraocular Movements: Extraocular movements intact.      Pupils: Pupils are equal, round, and reactive to light.   Cardiovascular:      Rate and Rhythm: Normal rate and regular rhythm.      Pulses: Normal pulses.      Heart sounds: No murmur heard.  Pulmonary:      Breath sounds: No stridor. Wheezing present. No rhonchi.   Abdominal:      General: Abdomen is flat. Bowel sounds are normal. There is no distension.      Tenderness: There is no abdominal tenderness. There is no guarding.   Musculoskeletal:         General: Deformity (Right leg externally rotated tender) present.      Right lower leg: No edema.      Left lower leg: No edema.   Skin:     General: Skin is warm.      Capillary Refill: Capillary refill takes less than 2 seconds.      Findings: No bruising or lesion.   Neurological:      General: No focal deficit present.      Mental Status: He is alert. Mental status is at baseline.      Motor: Weakness present.   Psychiatric:         Mood and Affect: Mood normal.         Thought Content: Thought content normal.         Laboratory data:    I have reviewed the labs done in the emergency room.    Results from last 7 days   Lab Units 12/26/23  1216   SODIUM mmol/L 139   POTASSIUM mmol/L 5.0   CHLORIDE mmol/L 101   CO2 mmol/L 24.8   BUN mg/dL 44*   CREATININE mg/dL 2.68*   CALCIUM mg/dL 9.5   BILIRUBIN mg/dL 0.7   ALK PHOS U/L 103   ALT (SGPT) U/L 11   AST (SGOT) U/L 19   GLUCOSE mg/dL 127*     Results from last 7 days   Lab Units 12/26/23  1216   WBC 10*3/mm3 10.49   HEMOGLOBIN g/dL 8.7*   HEMATOCRIT % 27.9*   PLATELETS 10*3/mm3 128*         Results from last 7 days   Lab Units " 12/26/23  1404 12/26/23  1216   HSTROP T ng/L 77* 80*     Results from last 7 days   Lab Units 12/26/23  1216   PROBNP pg/mL 1,662.0             Results from last 7 days   Lab Units 12/26/23  1327   PH, ARTERIAL pH units 7.409   PO2 ART mm Hg 58.9*   PCO2, ARTERIAL mm Hg 38.7   HCO3 ART mmol/L 24.4     Results from last 7 days   Lab Units 12/26/23  1350   COLOR UA  Yellow   GLUCOSE UA  Negative   KETONES UA  Negative   BLOOD UA  Negative   LEUKOCYTES UA  Small (1+)*   PH, URINE  6.0   BILIRUBIN UA  Negative   UROBILINOGEN UA  0.2 E.U./dL   RBC UA /HPF None Seen   WBC UA /HPF 6-10*       Pain Management Panel  More data may exist         Latest Ref Rng & Units 10/19/2021 10/9/2015   Pain Management Panel   Creatinine, Urine mg/dL 74.5  86.9        EKG:      Atrial paced rhythm    Radiology:    CT Cervical Spine Without Contrast    Result Date: 12/26/2023  PROCEDURE: CT CERVICAL SPINE WO CONTRAST-  HISTORY: Neck trauma (Age >= 65y), neck pain  TECHNIQUE: Thin section axial CT with sagittal and coronal reconstructions  FINDINGS: No fracture is present. Alignment is normal.  No bony canal stenosis is seen. Mild diffuse degenerative disc disease is present. Mild facet arthropathy is noted.      Negative CT evaluation of the cervical spine for acute bony injury.    This study was performed with techniques to keep radiation doses as low as reasonably achievable (ALARA). Individualized dose reduction techniques using automated exposure control or adjustment of vA and/or kV according to the patient size were employed.  This report was signed and finalized on 12/26/2023 2:34 PM by Niels Sierra MD.      CT Head Without Contrast    Result Date: 12/26/2023  PROCEDURE: CT HEAD WO CONTRAST-  HISTORY: Head trauma, minor (Age >= 65y), pain  COMPARISON: 7/15/2022  TECHNIQUE: Noncontrast exam  FINDINGS: Advanced atrophy and chronic ischemic white matter changes are noted.  No cortical edema is present. There is no mass or hemorrhage.  Ventricles are normal.  Bone windows show no skull fracture or obvious destructive lesion.      1. No acute intracranial abnormality or obvious mass. 2. Atrophy and chronic ischemic white matter changes as above.   This study was performed with techniques to keep radiation doses as low as reasonably achievable (ALARA). Individualized dose reduction techniques using automated exposure control or adjustment of vA and/or kV according to the patient size were employed.    This report was signed and finalized on 12/26/2023 2:31 PM by Niels Sierra MD.      XR Hips Bilateral With or Without Pelvis 3-4 View    Result Date: 12/26/2023  PROCEDURE: XR HIPS BILATERAL W OR WO PELVIS 3-4 VIEW-  THREE VIEW  HISTORY: trauma  FINDINGS:  Three views show fracture of the subcapital right femoral neck.  There is moderate displacement. Left proximal femur is intact.  The joint spaces appear normal.       Subcapital right femoral neck fracture as above.    This report was signed and finalized on 12/26/2023 2:19 PM by Niels Sierra MD.      XR Knee 3 View Right    Result Date: 12/26/2023  PROCEDURE: XR KNEE 3 VW RIGHT-  THREE VIEW  HISTORY: trauma, pain  FINDINGS:  Three views show no evidence of an acute, displaced fracture or dislocation of the visualized bony architecture.  The joint spaces appear normal. Generalized osteopenia is present.      Unremarkable exam.     This report was signed and finalized on 12/26/2023 2:18 PM by Niels Sierra MD.      XR Chest 1 View    Result Date: 12/26/2023  Chest single view  COMPARISON: Chest from 4-8-2019  FINDINGS: Cardiac silhouette is of normal size. Left subclavian pacemaker is present.  Mild linear density is identified in the perihilar regions bilaterally, consistent with atelectasis. No localized airspace infiltrates are seen.      1. Chronic changes and mild perihilar atelectasis, as described. 2. No evidence of acute infiltrate.  This report was signed and finalized on 12/26/2023 1:33 PM  by Erlin Duncan MD.       Assessment:    Right femoral neck fracture, POA  Acute on chronic hypoxic respiratory failure, POA   COPD with exacerbation  History of sick sinus syndrome status post pacemaker  Atrial fibrillation on Eliquis  Hearing loss  Acute on chronic kidney disease stage IV    Plan:    Admit as inpatient    Hip fracture:  Dr. Oliveros with orthopedics to see in consultation.  After initial discussion, patient not too sure if he wants to proceed with surgery, discussed significant risk without surgery.  He is concerned about his underlying medical conditions and anesthesia.  He will need preop evaluation by cardiology and improvement in pulmonary status is much as possible.  Tentatively plan for surgery may be Thursday.  As needed pain control.    COPD:  Likely worsened due to fall, however had symptoms of exacerbation prior to.  Currently oxygen saturations are stable on 4 L.  Will continue with bronchodilators and prednisone.    Sick sinus syndrome:  Follows with Dr. Haro.  Has pacemaker, also with atrial fibrillation chronically on Eliquis.    Renal failure:  Creatinine appears to be worsening from most recent in system.  Will place Lemons catheter for any possible retention, impaired mobility currently as well.  History of BPH.  Will consult with nephrology.  Avoid nephrotoxic agents.    Patient otherwise meets inpatient level care with anticipated stay greater than 2 midnights.  Further recommendation be depend upon improvement of clinical condition.  Plan care discussed with the patient no family currently at bedside.  He did note that he is a DNI/DNR.    Risk Assessment: High  DVT Prophylaxis: SCDs  Code Status: DNR  Diet: As tolerated, has dentures    Advance Care Planning  ACP discussion was held with the patient during this visit. Patient has an advance directive in EMR which is still valid.            Leslie Packer,   12/26/23  17:09 EST    Dictated utilizing Dragon  "dictation.    Electronically signed by Leslie Packer DO at 23 4513          Physician Progress Notes (last 48 hours)        Leslie Packer DO at 23 [phone]              [facility]    Name:  Jonathan Brown   Age:  89 y.o.  Sex:  male  :  1933  MRN:  [id number]   Visit Number:  [id number]  Admission Date:  2023  Date Of Service:  23  Primary Care Physician:  Sri Bangura MD     LOS: 2 days :    Chief Complaint:      Follow-up hip fracture, hypoxia    Subjective:    Very difficult situation currently.  At time of visit the patient was alert and able to follow commands, his speech is difficult to understand as he did not have teeth in.  He did appear labored at rest.  He reiterated to me that he did not want hip surgery, did not want a feeding tube, did not want to go to a nursing home.  No family was currently at bedside.  I did talk with the patient's daughter over the phone who is one of his power of 's.  I noted his current condition, his refusal of many things, and recommendations to talk with palliative care about their goals moving forward.  She was in agreement.    Hospital Course:    The patient is a chronically ill 89-year-old with medical history of chronic kidney disease stage IV, hypertension, hyperlipidemia, COPD with chronic respiratory failure, hypothyroidism, sick sinus syndrome status post pacemaker, paroxysmal atrial fibrillation, impaired mobility and ADLs, hearing loss, who had presented from home after sustaining a fall.  History obtained from ER record, patient at bedside.  No family available.  He did note he had been \"sick\" for a few days, somewhat short of breath with a cough.  Notes that earlier this morning while ambulating he had felt lightheaded, subsequently fell and landed on his right hip and lower leg.  Unable to ambulate thereafter.  He notes he does have COPD, has not smoked in " many years.  Does use inhalers at home.  Notes sister helps him some, is his POA, also has a daughter.     In the ER, he was normotensive, heart rate in the 80s, afebrile.  Saturating 95% on 4 L of oxygen.  White count 10 hemoglobin 8.7 platelets 128.  Procalcitonin 0.07 proBNP 1600 high-sensitivity troponin of 80.  Creatinine 2.68 potassium 5 negative flu COVID test.  ABG pH 7.409 pCO2 38 pO2 58.  Urinalysis with small leukocyte esterase and 2+ bacteria.  CT cervical spine without contrast unremarkable.  CT scan of the head without contrast unremarkable.  3 view right knee x-ray unremarkable.  Bilateral hip x-rays with evidence of a subcapital right femoral neck fracture with displacement noted.  Chest x-ray with chronic findings.  The patient was given pain control, bronchodilators, steroids.  Dr. Oliveros with orthopedics to see in consult.  We were asked admit thereafter.    The patient had had increasing oxygen requirements, after speech therapy evaluation he had evidence of severe aspiration and was made NPO.  Did spike fever overnight of 12/27/2023, was increasingly hypoxic and given diuretics and additional breathing treatments.  Was started on empiric antibiotics with Zosyn.    The patient's kidney function has also decreased while in the hospital.  Nephrology has been following.  In addition, his hip fracture surgery has been placed on hold as patient has yet to decide on this.  The patient does not wish to go to a nursing home facility, he does not wish to have a feeding tube placed, and overall appears fairly restless and miserable.  Goals of care discussion have been continued and palliative care has been consulted.    Review of Systems:     All systems were reviewed and negative except as mentioned in subjective, assessment and plan.    Vital Signs:    Temp:  [97.6 °F (36.4 °C)-100.8 °F (38.2 °C)] 97.6 °F (36.4 °C)  Heart Rate:  [] 73  Resp:  [12-24] 12  BP: ()/(52-89) 120/65    Intake and  output:    I/O last 3 completed shifts:  In: 1027 [P.O.:460; I.V.:567]  Out: 2070 [Urine:2070]  I/O this shift:  In: 182 [I.V.:182]  Out: 200 [Urine:200]    Physical Examination:    General Appearance:  Alert and cooperative.  Chronically ill-appearing   Head:  Atraumatic and normocephalic.   Eyes: Conjunctivae and sclerae normal, no icterus. No pallor.   Throat: No oral lesions, no thrush, oral mucosa moist.   Neck: Supple, trachea midline, no thyromegaly.   Lungs:   Diffuse rhonchi present.  High flow oxygen in place.   Heart:  Normal S1 and S2, no murmur, no gallop, no rub. No JVD.   Abdomen:   Normal bowel sounds, no masses, no organomegaly. Soft, nontender, nondistended, no rebound tenderness.   Extremities: Right hip externally rotated.  Edematous   Skin: No bleeding or rash.   Neurologic: Alert and oriented x 3. No facial asymmetry. Moves all four limbs. No tremors.  Weakness, hard of hearing     Laboratory results:    Results from last 7 days   Lab Units 12/28/23 0434 12/27/23 0434 12/26/23  1216   SODIUM mmol/L 136 138 139   POTASSIUM mmol/L 5.3* 4.9 5.0   CHLORIDE mmol/L 101 102 101   CO2 mmol/L 20.9* 22.2 24.8   BUN mg/dL 68* 48* 44*   CREATININE mg/dL 3.11* 2.77* 2.68*   CALCIUM mg/dL 8.5 9.2 9.5   BILIRUBIN mg/dL  --   --  0.7   ALK PHOS U/L  --   --  103   ALT (SGPT) U/L  --   --  11   AST (SGOT) U/L  --   --  19   GLUCOSE mg/dL 158* 162* 127*     Results from last 7 days   Lab Units 12/28/23  0434 12/27/23  0434 12/26/23  1216   WBC 10*3/mm3 9.10 12.35* 10.49   HEMOGLOBIN g/dL 8.0* 8.2* 8.7*   HEMATOCRIT % 25.1* 26.3* 27.9*   PLATELETS 10*3/mm3 128* 126* 128*         Results from last 7 days   Lab Units 12/28/23  0632 12/28/23  0434 12/27/23  2312 12/27/23 2052   CK TOTAL U/L  --  594*  --   --    HSTROP T ng/L 132*  --  113* 111*     Results from last 7 days   Lab Units 12/26/23  1350   URINECX  No growth     Recent Labs     12/26/23  1327   PHART 7.409   FPT2SNL 38.7   PO2ART 58.9*   WNV1APY 24.4    BASEEXCESS -0.2*      I have reviewed the patient's laboratory results.    Radiology results:    XR Chest 1 View    Result Date: 12/27/2023  FINAL REPORT TECHNIQUE: A single view of the chest was obtained. CLINICAL HISTORY: low sats FINDINGS: There is a left chest wall implanted cardiac device.  There is pulmonary venous congestion with diffuse interstitial opacities. There is no pneumothorax or significant pleural effusion. There is borderline cardiomegaly.     Impression: Lung findings could represent pulmonary edema or viral pneumonia. Authenticated and Electronically Signed by Garo Black MD on 12/27/2023 09:53:39 PM    CT Cervical Spine Without Contrast    Result Date: 12/26/2023  PROCEDURE: CT CERVICAL SPINE WO CONTRAST-  HISTORY: Neck trauma (Age >= 65y), neck pain  TECHNIQUE: Thin section axial CT with sagittal and coronal reconstructions  FINDINGS: No fracture is present. Alignment is normal.  No bony canal stenosis is seen. Mild diffuse degenerative disc disease is present. Mild facet arthropathy is noted.      Impression: Negative CT evaluation of the cervical spine for acute bony injury.    This study was performed with techniques to keep radiation doses as low as reasonably achievable (ALARA). Individualized dose reduction techniques using automated exposure control or adjustment of vA and/or kV according to the patient size were employed.  This report was signed and finalized on 12/26/2023 2:34 PM by Niels Sierra MD.      CT Head Without Contrast    Result Date: 12/26/2023  PROCEDURE: CT HEAD WO CONTRAST-  HISTORY: Head trauma, minor (Age >= 65y), pain  COMPARISON: 7/15/2022  TECHNIQUE: Noncontrast exam  FINDINGS: Advanced atrophy and chronic ischemic white matter changes are noted.  No cortical edema is present. There is no mass or hemorrhage. Ventricles are normal.  Bone windows show no skull fracture or obvious destructive lesion.      Impression: 1. No acute intracranial abnormality or obvious  mass. 2. Atrophy and chronic ischemic white matter changes as above.   This study was performed with techniques to keep radiation doses as low as reasonably achievable (ALARA). Individualized dose reduction techniques using automated exposure control or adjustment of vA and/or kV according to the patient size were employed.    This report was signed and finalized on 12/26/2023 2:31 PM by Niels Sierra MD.     I have reviewed the patient's radiology reports.    Medication Review:     I have reviewed the patient's active and prn medications.     Problem List:      Acute hypoxic respiratory failure    Severe malnutrition      Assessment:    Right femoral neck fracture, POA  Acute on chronic hypoxic respiratory failure, POA   COPD with exacerbation  History of sick sinus syndrome status post pacemaker  Atrial fibrillation on Eliquis  Hearing loss  Acute on chronic kidney disease stage IV    Plan:    Hip fracture:  The patient has been seen by orthopedics in consultation with recommendations for hip fracture surgery.  The patient has reiterated that he does not want surgery a couple times now, again today.  The patient also thinks he can bear weight on this and will be fine letting it heal on its own.  I discussed he cannot take care of himself at home in his current condition especially with a hip fracture, however he does not wish to go to a nursing facility and family would not be able to provide level of care.     COPD/aspiration:  The patient did have evidence of symptoms of COPD exacerbation prior to his fall, however after speech therapy evaluation, likely patient is aspirating.  She was concerned and did recommend an MBS, noted this would likely be abnormal and alternative ways to feed him including feeding tube/PEG tube would probably be recommended.  I discussed this with the patient who notes he would refuse that if necessary.     Sick sinus syndrome:  Follows with Dr. Haro.  Has pacemaker, also with atrial  fibrillation chronically on Eliquis.  Preop cardiology evaluation.     Renal failure:  Unfortunately kidney function has worsened.  Likely due to hypotension, possible shock, diuretic effect.  Nephrology following to give back some fluids today Lemons catheter remains in place.    Overall, situation remains fairly complicated.  Patient has refused surgical intervention of the hip, alternative feeding methods.  He reiterated he can take care of all the stuff at home, however obviously he cannot.  Daughter was updated today, going to talk with the other POA is Sister and palliative care.    DVT Prophylaxis: SCDs  Code Status: DNR  Diet: N.p.o.  Discharge Plan: TBD    Leslie Packer DO  23  13:59 EST    Dictated utilizing Dragon dictation.      Electronically signed by Leslie Packer DO at 23 1401       Samy Laurent MD at 23 0983              [facility] Progress Note      Patient Name: Jonathan Brown  : 1933  MRN: [id number]  Primary Care Physician:  Sri Bangura MD  Date of admission: 2023    Subjective     Interval History:     Overnight  Patient with cough and phlegm  Having dysphagia  Associated with the fever spikes 100.8    furosemide was given , due to decreased urine output        Review of Systems:   As noted above    Objective     Vitals:   Temp:  [97.6 °F (36.4 °C)-100.8 °F (38.2 °C)] 100.6 °F (38.1 °C)  Heart Rate:  [] 85  Resp:  [9-24] 20  BP: ()/(52-89) 92/53  Flow (L/min):  [3-15] 15    Intake/Output Summary (Last 24 hours) at 2023 0938  Last data filed at 2023 0410  Gross per 24 hour   Intake 787 ml   Output 555 ml   Net 232 ml       Physical Exam:      Constitutional: Chronically ill and deconditioned most from the oxygen bilateral temporal wasting  HEENT: Sclera anicteric, no conjunctival injection.  Dentures in place  Neck: Supple, no thyromegaly, no lymphadenopathy, trachea at midline, no  JVD  Respiratory: Fine crackles bibasilar  Cardiovascular: RRR, systolic murmur.  Left upper chest with pacemaker in place  Gastrointestinal: Positive bowel sounds, abdomen is soft, nontender and nondistended  : Lemons cath in place with gross hematuria  Musculoskeletal: Right femoral deformity  Psychiatric: Appropriate affect, cooperative  Neurologic: Oriented x3, moving all extremities, normal speech and mental status  Skin: Warm and dry           Scheduled Meds:     budesonide-formoterol, 2 puff, Inhalation, BID - RT  levothyroxine, 100 mcg, Oral, Q AM  midodrine, 5 mg, Oral, TID AC  montelukast, 10 mg, Oral, Nightly  piperacillin-tazobactam, 3.375 g, Intravenous, Q12H  predniSONE, 40 mg, Oral, Daily With Breakfast  senna-docusate sodium, 2 tablet, Oral, BID  sodium chloride, 10 mL, Intravenous, Q12H  sodium zirconium cyclosilicate, 10 g, Oral, TID      IV Meds:   Pharmacy to Dose Zosyn,         Results Reviewed:   I have personally reviewed the results from the time of this admission to 12/28/2023 09:38 EST     Results from last 7 days   Lab Units 12/28/23  0434 12/27/23 0434 12/26/23  1216   SODIUM mmol/L 136 138 139   POTASSIUM mmol/L 5.3* 4.9 5.0   CHLORIDE mmol/L 101 102 101   CO2 mmol/L 20.9* 22.2 24.8   BUN mg/dL 68* 48* 44*   CREATININE mg/dL 3.11* 2.77* 2.68*   CALCIUM mg/dL 8.5 9.2 9.5   BILIRUBIN mg/dL  --   --  0.7   ALK PHOS U/L  --   --  103   ALT (SGPT) U/L  --   --  11   AST (SGOT) U/L  --   --  19   GLUCOSE mg/dL 158* 162* 127*       Estimated Creatinine Clearance: 15.2 mL/min (A) (by C-G formula based on SCr of 3.11 mg/dL (H)).    Results from last 7 days   Lab Units 12/26/23  1216   MAGNESIUM mg/dL 2.4             Results from last 7 days   Lab Units 12/28/23  0434 12/27/23  0434 12/26/23  1216   WBC 10*3/mm3 9.10 12.35* 10.49   HEMOGLOBIN g/dL 8.0* 8.2* 8.7*   PLATELETS 10*3/mm3 128* 126* 128*             Assessment / Plan     ASSESSMENT:    - Acute kidney injury likely prerenal patient has  been feeling lightheaded prior to his falls while taking ACE inhibitor's/currently placed on hold. Upon admission Cr of 2.6 .> 3.1  Lemons catheter in place . Avoid nephrotoxins. Strict I/O      -Chronic kidney disease stage IV ( baseline 1.8-2.1 )  .  Likely secondary to hypertensive nephrosclerosis ,heavy tobacco abuse.chronic NSAID use, and reduced renal mass. .  Urinalysis pyuria previous microalbuminuria 40 mg.  CT scan abdomen pelvis showing bilateral atrophic kidneys without hydronephrosis and unremarkable bladder    -Hyperkalemia ordered Lokelma and IV fluids, will follow K trend     -History of heavy tobacco abuse with chronic obstructive pulm disease images studies showing emphysema.  On chronic supplemental oxygen, inhalers followed by respiratory therapy.  Possible aspiration pneumonia ?  As per primary team     -History of hypertension .  Home dose included amlodipine, irbesartan doxazosin  and tamsulosin     -Right displaced femoral neck fracture, followed by orthopedics on oral analgesia     -History of A-fib with sick sinus syndrome status post pacemaker currently on telemetry     -Acute on chronic normocytic anemia.  Order iron studies     -Benign prostatic hyperplasia 04/13/2023 , patient was previously on tamsulosin and finasteride     -Abdominal aortic aneurysm (HCC) 07/07/2016      -History of  atrial fibrillation with sick sinus syndrome status post pacemaker placement home dose including anticoagulation with apixaban     -Dysphagia (as per patient)  can consider speech therapy if clinically indicated    PLAN:  Renal function continues to worsen  Patient was given furosemide overnight due to worsening dyspnea and decreased urine output, possible his dyspnea might be related to aspiration pneumonia on the setting of fever spikes.   I would avoid diuretics and I will place him in a gentle IV fluids as patient has been unable to drink and eat properly,  while hopefully getting speech therapy  evaluation.   Noticed hyperkalemia order Lokelma and gentle hydration    Discussed with nursing staff    Thank you for involving us in the care of Jonathan Brown.  Please feel free to call with any questions.    Samy Laurent MD  23  09:38 [facility]    [facility]  [phone]    Please note that portions of this note were completed with a voice recognition program.      Electronically signed by Samy Laurent MD at 23 0943       Niels Light PA-C at 23 0801          Orthopedic progress report    Subjective: Patient is a history of right hip femoral neck fracture.  Surgery was discussed as an alternative with the family and the patient.  They have yet to determine if they wish to pursue surgery.  He has developed multiple medical concerns at this time.    Objective: Right hip: Pain to palpation and with passive hip motion.  Mild edema right lower Extremity.  Negative Homans.    Assessment: Right femoral neck fracture.    Plan: At this time patient is going to continue medical management through the ICU.  If the family wishes to pursue surgical intervention, we will consider tomorrow with Dr. Oliveros.  We will follow his hospital course.    All questions were answered.  This patient was evaluated and treated under the supervision of Dr Diego Oliveros. Dictated by Niels Light PA-C.    Electronically signed by Niels Light PA-C at 23 1246       Leslie Packer DO at 23 1546              [facility]    PROGRESS NOTE    Name:  Jonathan Brown   Age:  89 y.o.  Sex:  male  :  1933  MRN:  [id number]   Visit Number:  [id number]  Admission Date:  2023  Date Of Service:  23  Primary Care Physician:  Sri Bangura MD     LOS: 1 day :    Chief Complaint:      Follow-up hip fracture, hypoxia    Subjective:    Patient was seen this morning, he was attempting to eat breakfast, appeared to have some  "issues with dysphagia but also did not have his dentures.  Apparently he had already talked with orthopedics and they were recommending surgery, was notified by RN later that patient is considering not doing surgery.    Hospital Course:    The patient is a chronically ill 89-year-old with medical history of chronic kidney disease stage IV, hypertension, hyperlipidemia, COPD with chronic respiratory failure, hypothyroidism, sick sinus syndrome status post pacemaker, paroxysmal atrial fibrillation, impaired mobility and ADLs, hearing loss, who had presented from home after sustaining a fall.  History obtained from ER record, patient at bedside.  No family available.  He did note he had been \"sick\" for a few days, somewhat short of breath with a cough.  Notes that earlier this morning while ambulating he had felt lightheaded, subsequently fell and landed on his right hip and lower leg.  Unable to ambulate thereafter.  He notes he does have COPD, has not smoked in many years.  Does use inhalers at home.  Notes sister helps him some, is his POA, also has a daughter.     In the ER, he was normotensive, heart rate in the 80s, afebrile.  Saturating 95% on 4 L of oxygen.  White count 10 hemoglobin 8.7 platelets 128.  Procalcitonin 0.07 proBNP 1600 high-sensitivity troponin of 80.  Creatinine 2.68 potassium 5 negative flu COVID test.  ABG pH 7.409 pCO2 38 pO2 58.  Urinalysis with small leukocyte esterase and 2+ bacteria.  CT cervical spine without contrast unremarkable.  CT scan of the head without contrast unremarkable.  3 view right knee x-ray unremarkable.  Bilateral hip x-rays with evidence of a subcapital right femoral neck fracture with displacement noted.  Chest x-ray with chronic findings.  The patient was given pain control, bronchodilators, steroids.  Dr. Oliveros with orthopedics to see in consult.  We were asked admit thereafter.    Review of Systems:     All systems were reviewed and negative except as mentioned in " subjective, assessment and plan.    Vital Signs:    Temp:  [97.2 °F (36.2 °C)-98.8 °F (37.1 °C)] 97.6 °F (36.4 °C)  Heart Rate:  [72-98] 76  Resp:  [9-20] 9  BP: ()/(49-77) 123/69    Intake and output:    I/O last 3 completed shifts:  In: -   Out: 1475 [Urine:1475]  I/O this shift:  In: 240 [P.O.:240]  Out: 70 [Urine:70]    Physical Examination:    General Appearance:  Alert and cooperative.  Chronically ill-appearing   Head:  Atraumatic and normocephalic.   Eyes: Conjunctivae and sclerae normal, no icterus. No pallor.   Throat: No oral lesions, no thrush, oral mucosa moist.   Neck: Supple, trachea midline, no thyromegaly.   Lungs:   Scattered wheezes   Heart:  Normal S1 and S2, no murmur, no gallop, no rub. No JVD.   Abdomen:   Normal bowel sounds, no masses, no organomegaly. Soft, nontender, nondistended, no rebound tenderness.   Extremities: Right hip externally rotated.   Skin: No bleeding or rash.   Neurologic: Alert and oriented x 3. No facial asymmetry. Moves all four limbs. No tremors.  Weakness, hard of hearing     Laboratory results:    Results from last 7 days   Lab Units 12/27/23  0434 12/26/23  1216   SODIUM mmol/L 138 139   POTASSIUM mmol/L 4.9 5.0   CHLORIDE mmol/L 102 101   CO2 mmol/L 22.2 24.8   BUN mg/dL 48* 44*   CREATININE mg/dL 2.77* 2.68*   CALCIUM mg/dL 9.2 9.5   BILIRUBIN mg/dL  --  0.7   ALK PHOS U/L  --  103   ALT (SGPT) U/L  --  11   AST (SGOT) U/L  --  19   GLUCOSE mg/dL 162* 127*     Results from last 7 days   Lab Units 12/27/23  0434 12/26/23  1216   WBC 10*3/mm3 12.35* 10.49   HEMOGLOBIN g/dL 8.2* 8.7*   HEMATOCRIT % 26.3* 27.9*   PLATELETS 10*3/mm3 126* 128*         Results from last 7 days   Lab Units 12/26/23  1404 12/26/23  1216   HSTROP T ng/L 77* 80*         Recent Labs     12/26/23  1327   PHART 7.409   EDV4AYR 38.7   PO2ART 58.9*   RAU4XFE 24.4   BASEEXCESS -0.2*      I have reviewed the patient's laboratory results.    Radiology results:    CT Cervical Spine Without  Contrast    Result Date: 12/26/2023  PROCEDURE: CT CERVICAL SPINE WO CONTRAST-  HISTORY: Neck trauma (Age >= 65y), neck pain  TECHNIQUE: Thin section axial CT with sagittal and coronal reconstructions  FINDINGS: No fracture is present. Alignment is normal.  No bony canal stenosis is seen. Mild diffuse degenerative disc disease is present. Mild facet arthropathy is noted.      Impression: Negative CT evaluation of the cervical spine for acute bony injury.    This study was performed with techniques to keep radiation doses as low as reasonably achievable (ALARA). Individualized dose reduction techniques using automated exposure control or adjustment of vA and/or kV according to the patient size were employed.  This report was signed and finalized on 12/26/2023 2:34 PM by Niels Sierra MD.      CT Head Without Contrast    Result Date: 12/26/2023  PROCEDURE: CT HEAD WO CONTRAST-  HISTORY: Head trauma, minor (Age >= 65y), pain  COMPARISON: 7/15/2022  TECHNIQUE: Noncontrast exam  FINDINGS: Advanced atrophy and chronic ischemic white matter changes are noted.  No cortical edema is present. There is no mass or hemorrhage. Ventricles are normal.  Bone windows show no skull fracture or obvious destructive lesion.      Impression: 1. No acute intracranial abnormality or obvious mass. 2. Atrophy and chronic ischemic white matter changes as above.   This study was performed with techniques to keep radiation doses as low as reasonably achievable (ALARA). Individualized dose reduction techniques using automated exposure control or adjustment of vA and/or kV according to the patient size were employed.    This report was signed and finalized on 12/26/2023 2:31 PM by Niels Sierra MD.      XR Hips Bilateral With or Without Pelvis 3-4 View    Result Date: 12/26/2023  PROCEDURE: XR HIPS BILATERAL W OR WO PELVIS 3-4 VIEW-  THREE VIEW  HISTORY: trauma  FINDINGS:  Three views show fracture of the subcapital right femoral neck.  There is  moderate displacement. Left proximal femur is intact.  The joint spaces appear normal.       Impression: Subcapital right femoral neck fracture as above.    This report was signed and finalized on 12/26/2023 2:19 PM by Niels Sierra MD.      XR Knee 3 View Right    Result Date: 12/26/2023  PROCEDURE: XR KNEE 3 VW RIGHT-  THREE VIEW  HISTORY: trauma, pain  FINDINGS:  Three views show no evidence of an acute, displaced fracture or dislocation of the visualized bony architecture.  The joint spaces appear normal. Generalized osteopenia is present.      Impression: Unremarkable exam.     This report was signed and finalized on 12/26/2023 2:18 PM by Niels Sierra MD.      XR Chest 1 View    Result Date: 12/26/2023  Chest single view  COMPARISON: Chest from 4-8-2019  FINDINGS: Cardiac silhouette is of normal size. Left subclavian pacemaker is present.  Mild linear density is identified in the perihilar regions bilaterally, consistent with atelectasis. No localized airspace infiltrates are seen.      Impression: 1. Chronic changes and mild perihilar atelectasis, as described. 2. No evidence of acute infiltrate.  This report was signed and finalized on 12/26/2023 1:33 PM by Erlin Duncan MD.     I have reviewed the patient's radiology reports.    Medication Review:     I have reviewed the patient's active and prn medications.     Problem List:      Acute hypoxic respiratory failure      Assessment:    Right femoral neck fracture, POA  Acute on chronic hypoxic respiratory failure, POA   COPD with exacerbation  History of sick sinus syndrome status post pacemaker  Atrial fibrillation on Eliquis  Hearing loss  Acute on chronic kidney disease stage IV    Plan:    Hip fracture:  Was seen by orthopedics in consultation.  Patient initially agreeable, power of  was as well.  Was notified later today that patient does not want to go to nursing home, is not sure if he wants surgery now despite multiple conversations about risk  and benefit of both doing the surgery and not doing the surgery.  Will need further discussions with family.    If patient chooses not to proceed with operative intervention, would recommend palliative and hospice evaluations and likely placement.     COPD:  Likely worsened due to fall, however had symptoms of exacerbation prior to.  Currently oxygen saturations are stable on 4 L.  Will continue with bronchodilators and prednisone.     Sick sinus syndrome:  Follows with Dr. Haro.  Has pacemaker, also with atrial fibrillation chronically on Eliquis.  Preop cardiology evaluation.     Renal failure:  Creatinine appears to be worsening from most recent in system.  Lemons catheter was placed due to concern for possible retention and impaired mobility.  Nephrology consulted.    DVT Prophylaxis: SCDs  Code Status: DNR  Diet: Pending speech eval  Discharge Plan: TBD    Leslie Packer DO  23  15:46 EST    Dictated utilizing Dragon dictation.      Electronically signed by Leslie Packer DO at 23 1550          Consult Notes (last 48 hours)        Samy Laurent MD at 23 1042        Consult Orders    1. Inpatient Nephrology Consult [[id number]] ordered by Leslie Packer DO at 23 1802                   [facility] Consult Note      Patient Name: Jonathan Brown  : 1933  MRN: [id number]  Primary Care Physician:  Sri Bangura MD  Referring Physician: No ref. provider found  Date of admission: 2023    Subjective     Reason for Consult: Chronic kidney disease stage IV    HPI:   Jonathan Brown is a 89 y.o. male with past medical history of heavy tobacco abuse with chronic obstructive pulm disease on supplemental oxygen , chronic normocytic anemia, osteoarthritis, history of hiatal hernia, hypertension, dyslipidemia, history of nephrolithiasis, thyroid disease, history of sick sinus syndrome and atrial fibrillation status post  pacemaker and chronic kidney disease stage IV.     Patient resides at home and ambulates with assistance of a walker he felt lightheaded and has been having falls at home.  Yesterday patient  fell and sustained trauma to his hip with local deformity and severe discomfort . He presented to the emergency department where he was found to have a right displaced femoral neck fracture, that  require hospital admission    Nephrology consultation been requested for the management    Patient complaining of recent weight loss with some dysphagia    Review of Systems:   14 point review of systems is otherwise negative except for mentioned above on HPI    Personal History     Past Medical History:   Diagnosis Date    Anemia     Arthritis     Asthma     Blood clotting disorder     COPD (chronic obstructive pulmonary disease)     H/O emphysema     Hiatal hernia     History of blood transfusion     Hyperlipidemia     Hypertension     Kidney infection     Kidney stones     Positive TB test     Thyroid disease        Past Surgical History:   Procedure Laterality Date    ABDOMINAL AORTIC ANEURYSM REPAIR      CARDIAC ELECTROPHYSIOLOGY PROCEDURE N/A 4/8/2019    Procedure: Device Implant;  Surgeon: Dom Gallardo MD;  Location: [facility] INVASIVE LOCATION;  Service: Cardiology    HERNIA REPAIR      KNEE SURGERY         Family History: family history includes Bone cancer in his sister; Lung cancer in his brother; No Known Problems in his father and mother.    Social History:  reports that he quit smoking about 25 years ago. His smoking use included cigarettes. He has a 45.00 pack-year smoking history. He has been exposed to tobacco smoke. He has never used smokeless tobacco. He reports that he does not drink alcohol and does not use drugs.    Home Medications:  Prior to Admission medications    Medication Sig Start Date End Date Taking? Authorizing Provider   amLODIPine (NORVASC) 5 MG tablet Take 1 tablet by mouth Daily.     Shanna Loaiza MD   apixaban (ELIQUIS) 2.5 MG tablet tablet Take 1 tablet by mouth Every 12 (Twelve) Hours.    Shanna Loaiza MD   aspirin 81 MG EC tablet Take 1 tablet by mouth Daily.    Shanna Loaiza MD   Cyanocobalamin (VITAMIN B-12 PO) Take  by mouth.    Shanna Loaiza MD   doxazosin (CARDURA) 4 MG tablet Take 1 tablet by mouth Every Night. 9/23/17   Christian Lane MD   ferrous sulfate 325 (65 FE) MG tablet Take 1 tablet by mouth Daily With Breakfast.    Shanna Loaiza MD   finasteride (PROSCAR) 5 MG tablet Take 1 tablet by mouth Daily. 9/27/18   Christian Lane MD   irbesartan (AVAPRO) 150 MG tablet irbesartan 150 mg tablet   TAKE 1 TABLET EVERY DAY    Shanna Loaiza MD   irbesartan (AVAPRO) 300 MG tablet Take 1 tablet by mouth Every Night.    Shanna Loaiza MD   levothyroxine (SYNTHROID, LEVOTHROID) 100 MCG tablet levothyroxine 100 mcg tablet   TAKE 1 TABLET EVERY DAY    Shanna Loaiza MD   montelukast (SINGULAIR) 10 MG tablet montelukast 10 mg tablet    Shanna Loaiza MD   Multiple Vitamins-Minerals (PRESERVISION AREDS 2 PO) Take  by mouth.    Shanna Loaiza MD   omeprazole (priLOSEC) 20 MG capsule Take 1 capsule by mouth Daily.    Shanna Loaiza MD   ondansetron (ZOFRAN) 4 MG tablet Take 1 tablet by mouth Every 8 (Eight) Hours As Needed for Nausea or Vomiting. 10/18/22   Estela Montes APRN   Polyethylene Glycol powder Take 1 cap full (17 grams) in 8 oz of noncarbonated beverage and drink once daily 10/18/22   Estela Montes APRN   simvastatin (ZOCOR) 40 MG tablet simvastatin 40 mg tablet    Shanna Loaiza MD   tamsulosin (FLOMAX) 0.4 MG capsule 24 hr capsule tamsulosin 0.4 mg capsule    Shanna Loaiza MD   tiotropium (SPIRIVA) 18 MCG per inhalation capsule Spiriva with HandiHaler 18 mcg and inhalation capsules    Shanna Loaiza MD       Allergies:  Allergies   Allergen Reactions    Atorvastatin  Unknown (See Comments)       Objective     Vitals:   Temp:  [97.2 °F (36.2 °C)-98.8 °F (37.1 °C)] 98.5 °F (36.9 °C)  Heart Rate:  [72-98] 81  Resp:  [12-20] 12  BP: ()/(49-83) 121/75  Flow (L/min):  [3-6] 3    Intake/Output Summary (Last 24 hours) at 12/27/2023 1042  Last data filed at 12/27/2023 0900  Gross per 24 hour   Intake 240 ml   Output 1515 ml   Net -1275 ml       Physical Exam:   Constitutional: Chronically ill and deconditioned most from the oxygen bilateral temporal wasting  HEENT: Sclera anicteric, no conjunctival injection.  Dentures in place  Neck: Supple, no thyromegaly, no lymphadenopathy, trachea at midline, no JVD  Respiratory: Fine crackles bibasilar  Cardiovascular: RRR, systolic murmur.  Left upper chest with pacemaker in place  Gastrointestinal: Positive bowel sounds, abdomen is soft, nontender and nondistended  : Lemons cath in place with gross hematuria  Musculoskeletal: Right femoral deformity  Psychiatric: Appropriate affect, cooperative  Neurologic: Oriented x3, moving all extremities, normal speech and mental status  Skin: Warm and dry       Scheduled Meds:     budesonide-formoterol, 2 puff, Inhalation, BID - RT  ceFAZolin, 2 g, Intravenous, Once  cefTRIAXone, 1,000 mg, Intravenous, Q24H  clindamycin, 900 mg, Intravenous, Once  ethyl alcohol, 2 swab , Nasal, Once  levothyroxine, 100 mcg, Oral, Q AM  montelukast, 10 mg, Oral, Nightly  predniSONE, 40 mg, Oral, Daily With Breakfast  senna-docusate sodium, 2 tablet, Oral, BID  sodium chloride, 10 mL, Intravenous, Q12H      IV Meds:        Results Reviewed:   I have personally reviewed the results from the time of this admission to 12/27/2023 10:42 EST     Results from last 7 days   Lab Units 12/27/23  0434 12/26/23  1216   WBC 10*3/mm3 12.35* 10.49   HEMOGLOBIN g/dL 8.2* 8.7*   HEMATOCRIT % 26.3* 27.9*   PLATELETS 10*3/mm3 126* 128*         Lab Results   Component Value Date    GLUCOSE 162 (H) 12/27/2023    CALCIUM 9.2 12/27/2023      12/27/2023    K 4.9 12/27/2023    CO2 22.2 12/27/2023     12/27/2023    BUN 48 (H) 12/27/2023    CREATININE 2.77 (H) 12/27/2023    EGFRIFNONA 42 (L) 10/19/2021    BCR 17.3 12/27/2023    ANIONGAP 13.8 12/27/2023      Lab Results   Component Value Date    MG 2.4 12/26/2023    PHOS 3.2 05/04/2021    ALBUMIN 4.4 12/26/2023           Assessment / Plan       Acute hypoxic respiratory failure      ASSESSMENT:    - Acute kidney injury likely prerenal patient has been feeling lightheaded prior to his falls while taking ACE inhibitor's/currently placed on hold. Upon admission Cr of 2.6 . Lemons catheter in place . Avoid nephrotoxins. Strict I/O     -Chronic kidney disease stage IV ( baseline 1.8-2.1 )  .  Likely secondary to hypertensive nephrosclerosis ,heavy tobacco abuse.chronic NSAID use, and reduced renal mass. .  Urinalysis pyuria previous microalbuminuria 40 mg.  CT scan abdomen pelvis showing bilateral atrophic kidneys without hydronephrosis and unremarkable bladder    -History of heavy tobacco abuse with chronic obstructive pulm disease images studies showing emphysema.  On chronic supplemental oxygen, inhalers followed by respiratory therapy    -History of hypertension .  Home dose included amlodipine, irbesartan doxazosin  and tamsulosin    -Right displaced femoral neck fracture, followed by orthopedics on oral analgesia    -History of A-fib with sick sinus syndrome status post pacemaker currently on telemetry    -Acute on chronic normocytic anemia.  Order iron studies    -Benign prostatic hyperplasia 04/13/2023 , patient was previously on tamsulosin and finasteride    -Abdominal aortic aneurysm (HCC) 07/07/2016     -History of  atrial fibrillation with sick sinus syndrome status post pacemaker placement home dose including anticoagulation with apixaban    -Dysphagia (as per patient)  can consider speech therapy if clinically indicated    PLAN:  -Agreeable to holding antihypertensives  -Lemons catheter  in place   -Will obtain CPKs  -His Blood pressure has improved and is currently at baseline ( home reading at home around 110 to 120's )   - Will obtain iron panel to determine the need for Epogen or IV iron  -Agree with holding home dose  antihypertensives which include amlodipine, doxazosin and irbesartan and likely cause of his lightheadedness before his fall    Discussed with ICU nursing staff    Thank you for involving us in the care of Jonathan Brown.  Please feel free to call with any questions.    Samy Laurent MD  12/27/23  10:42 [facility]    [facility]  [phone]      Please note that portions of this note were completed with a voice recognition program.      Electronically signed by Samy Laurent MD at 12/27/23 2032       Diego Oliveros MD at 12/27/23 0882        Consult Orders    1. Inpatient Orthopedic Surgery Consult [[id number]] ordered by Leslie Packer DO at 12/26/23 1802                     Patient Care Team:  Sri Bangura MD as PCP - General    Chief complaint right hip pain    Subjective     Patient is a 89 y.o. male presents with right hip pain.  From a ground-level fall.  Complains of pain only in the right hip.        Review of Systems   Pertinent items are noted in HPI    History  Past Medical History:   Diagnosis Date    Anemia     Arthritis     Asthma     Blood clotting disorder     COPD (chronic obstructive pulmonary disease)     H/O emphysema     Hiatal hernia     History of blood transfusion     Hyperlipidemia     Hypertension     Kidney infection     Kidney stones     Positive TB test     Thyroid disease      Past Surgical History:   Procedure Laterality Date    ABDOMINAL AORTIC ANEURYSM REPAIR      CARDIAC ELECTROPHYSIOLOGY PROCEDURE N/A 4/8/2019    Procedure: Device Implant;  Surgeon: Dom Gallardo MD;  Location: [facility] CATH INVASIVE LOCATION;  Service: Cardiology    HERNIA REPAIR      KNEE SURGERY       Family History    Problem Relation Age of Onset    No Known Problems Mother     No Known Problems Father     Bone cancer Sister     Lung cancer Brother     Colon cancer Neg Hx      Social History     Tobacco Use    Smoking status: Former     Packs/day: 1.50     Years: 30.00     Additional pack years: 0.00     Total pack years: 45.00     Types: Cigarettes     Quit date: 3/5/1998     Years since quittin.8     Passive exposure: Past    Smokeless tobacco: Never   Vaping Use    Vaping Use: Never used   Substance Use Topics    Alcohol use: No    Drug use: No     Medications Prior to Admission   Medication Sig Dispense Refill Last Dose    amLODIPine (NORVASC) 5 MG tablet Take 1 tablet by mouth Daily.       apixaban (ELIQUIS) 2.5 MG tablet tablet Take 1 tablet by mouth Every 12 (Twelve) Hours.       aspirin 81 MG EC tablet Take 1 tablet by mouth Daily.       Cyanocobalamin (VITAMIN B-12 PO) Take  by mouth.       doxazosin (CARDURA) 4 MG tablet Take 1 tablet by mouth Every Night. 90 tablet 3     ferrous sulfate 325 (65 FE) MG tablet Take 1 tablet by mouth Daily With Breakfast.       finasteride (PROSCAR) 5 MG tablet Take 1 tablet by mouth Daily. 90 tablet 3     irbesartan (AVAPRO) 150 MG tablet irbesartan 150 mg tablet   TAKE 1 TABLET EVERY DAY       irbesartan (AVAPRO) 300 MG tablet Take 1 tablet by mouth Every Night.       levothyroxine (SYNTHROID, LEVOTHROID) 100 MCG tablet levothyroxine 100 mcg tablet   TAKE 1 TABLET EVERY DAY       montelukast (SINGULAIR) 10 MG tablet montelukast 10 mg tablet       Multiple Vitamins-Minerals (PRESERVISION AREDS 2 PO) Take  by mouth.       omeprazole (priLOSEC) 20 MG capsule Take 1 capsule by mouth Daily.       ondansetron (ZOFRAN) 4 MG tablet Take 1 tablet by mouth Every 8 (Eight) Hours As Needed for Nausea or Vomiting. 30 tablet 1     Polyethylene Glycol powder Take 1 cap full (17 grams) in 8 oz of noncarbonated beverage and drink once daily 500 g 3     simvastatin (ZOCOR) 40 MG tablet  simvastatin 40 mg tablet       tamsulosin (FLOMAX) 0.4 MG capsule 24 hr capsule tamsulosin 0.4 mg capsule       tiotropium (SPIRIVA) 18 MCG per inhalation capsule Spiriva with HandiHaler 18 mcg and inhalation capsules        Allergies:  Atorvastatin    Objective     Vital Signs  Temp:  [97.2 °F (36.2 °C)-98.8 °F (37.1 °C)] 98.5 °F (36.9 °C)  Heart Rate:  [72-98] 93  Resp:  [16-20] 16  BP: ()/(49-83) 128/71    Physical Exam:      General Appearance:  Alert, cooperative, in no acute distress   Head:  Normocephalic, without obvious abnormality, atraumatic   Eyes:  Lids and lashes normal, conjunctivae and sclerae normal, no icterus, no pallor, corneas clear, PERRLA   Ears:  Ears appear intact with no abnormalities noted   Throat:  No oral lesions, no thrush, oral mucosa moist   Neck:  No adenopathy, supple, trachea midline, no thyromegaly, no carotid bruit, no JVD   Back:  No kyphosis present, no scoliosis present, no skin lesions, erythema or scars, no tenderness to percussion or palpation, range of motion normal   Lungs:  Clear to auscultation, respirations regular, even and unlabored    Heart:  Regular rhythm and normal rate, normal S1 and S2, no murmur, no gallop, no rub, no click   Chest Wall:  No abnormalities observed   Abdomen:  Normal bowel sounds, no masses, no organomegaly, soft non-tender, non-distended, no guarding, no rebound tenderness   Rectal:  Deferred   Extremities:  Moves all extremities well, no edema, no cyanosis, no redness   Pulses:  Pulses palpable and equal bilaterally   Skin:  No bleeding, bruising or rash   Lymph nodes:  No palpable adenopathy   Neurologic:  Cranial nerves 2 - 12 grossly intact, sensation intact, DTR present and equal bilaterally                                                                               Results Review:    I reviewed the patient's new clinical results.    Assessment & Plan       Acute hypoxic respiratory failure      Right hip displaced femoral neck  fracture.    Plan would be for a right hip hemiarthroplasty pending medical optimization.  If patient decides for surgical intervention.  Will discuss with the patient and the power of  sister further for for definitive plans.  Will keep n.p.o. at midnight in case we decide for surgical intervention.    I had discussion with the patient's sister-in-law power of  who provides some care for him he is a household ambulator with a walker.  He lives by himself we did discuss the risk and benefits of surgical versus nonoperative treatment including pneumonia risk with anesthesia stroke heart attack death infection and we will plan for surgical intervention pending medical optimization    I discussed the patients findings and my recommendations with patient.     Diego Oliveros MD  12/27/23  08:13 EST            Electronically signed by Diego Oliveros MD at 12/27/23 0822       Physical Therapy Notes (last 48 hours)  Notes from 12/26/23 1444 through 12/28/23 1444   No notes exist for this encounter.       Occupational Therapy Notes (last 48 hours)  Notes from 12/26/23 1444 through 12/28/23 1444   No notes exist for this encounter.

## 2023-12-28 NOTE — PROGRESS NOTES
[facility] Progress Note      Patient Name: Jonathan Brown  : 1933  MRN: [id number]  Primary Care Physician:  Sri Bangura MD  Date of admission: 2023    Subjective     Interval History:     Overnight  Patient with cough and phlegm  Having dysphagia  Associated with the fever spikes 100.8    furosemide was given , due to decreased urine output        Review of Systems:   As noted above    Objective     Vitals:   Temp:  [97.6 °F (36.4 °C)-100.8 °F (38.2 °C)] 100.6 °F (38.1 °C)  Heart Rate:  [] 85  Resp:  [9-24] 20  BP: ()/(52-89) 92/53  Flow (L/min):  [3-15] 15    Intake/Output Summary (Last 24 hours) at 2023 0938  Last data filed at 2023 0410  Gross per 24 hour   Intake 787 ml   Output 555 ml   Net 232 ml       Physical Exam:      Constitutional: Chronically ill and deconditioned most from the oxygen bilateral temporal wasting  HEENT: Sclera anicteric, no conjunctival injection.  Dentures in place  Neck: Supple, no thyromegaly, no lymphadenopathy, trachea at midline, no JVD  Respiratory: Fine crackles bibasilar  Cardiovascular: RRR, systolic murmur.  Left upper chest with pacemaker in place  Gastrointestinal: Positive bowel sounds, abdomen is soft, nontender and nondistended  : Lemons cath in place with gross hematuria  Musculoskeletal: Right femoral deformity  Psychiatric: Appropriate affect, cooperative  Neurologic: Oriented x3, moving all extremities, normal speech and mental status  Skin: Warm and dry           Scheduled Meds:     budesonide-formoterol, 2 puff, Inhalation, BID - RT  levothyroxine, 100 mcg, Oral, Q AM  midodrine, 5 mg, Oral, TID AC  montelukast, 10 mg, Oral, Nightly  piperacillin-tazobactam, 3.375 g, Intravenous, Q12H  predniSONE, 40 mg, Oral, Daily With Breakfast  senna-docusate sodium, 2 tablet, Oral, BID  sodium chloride, 10 mL, Intravenous, Q12H  sodium zirconium cyclosilicate, 10 g, Oral, TID      IV Meds:    Pharmacy to Dose Zosyn,         Results Reviewed:   I have personally reviewed the results from the time of this admission to 12/28/2023 09:38 EST     Results from last 7 days   Lab Units 12/28/23  0434 12/27/23  0434 12/26/23  1216   SODIUM mmol/L 136 138 139   POTASSIUM mmol/L 5.3* 4.9 5.0   CHLORIDE mmol/L 101 102 101   CO2 mmol/L 20.9* 22.2 24.8   BUN mg/dL 68* 48* 44*   CREATININE mg/dL 3.11* 2.77* 2.68*   CALCIUM mg/dL 8.5 9.2 9.5   BILIRUBIN mg/dL  --   --  0.7   ALK PHOS U/L  --   --  103   ALT (SGPT) U/L  --   --  11   AST (SGOT) U/L  --   --  19   GLUCOSE mg/dL 158* 162* 127*       Estimated Creatinine Clearance: 15.2 mL/min (A) (by C-G formula based on SCr of 3.11 mg/dL (H)).    Results from last 7 days   Lab Units 12/26/23  1216   MAGNESIUM mg/dL 2.4             Results from last 7 days   Lab Units 12/28/23  0434 12/27/23 0434 12/26/23  1216   WBC 10*3/mm3 9.10 12.35* 10.49   HEMOGLOBIN g/dL 8.0* 8.2* 8.7*   PLATELETS 10*3/mm3 128* 126* 128*             Assessment / Plan     ASSESSMENT:    - Acute kidney injury likely prerenal patient has been feeling lightheaded prior to his falls while taking ACE inhibitor's/currently placed on hold. Upon admission Cr of 2.6 .> 3.1  Lemons catheter in place . Avoid nephrotoxins. Strict I/O      -Chronic kidney disease stage IV ( baseline 1.8-2.1 )  .  Likely secondary to hypertensive nephrosclerosis ,heavy tobacco abuse.chronic NSAID use, and reduced renal mass. .  Urinalysis pyuria previous microalbuminuria 40 mg.  CT scan abdomen pelvis showing bilateral atrophic kidneys without hydronephrosis and unremarkable bladder    -Hyperkalemia ordered Lokelma and IV fluids, will follow K trend     -History of heavy tobacco abuse with chronic obstructive pulm disease images studies showing emphysema.  On chronic supplemental oxygen, inhalers followed by respiratory therapy.  Possible aspiration pneumonia ?  As per primary team     -History of hypertension .  Home dose  included amlodipine, irbesartan doxazosin  and tamsulosin     -Right displaced femoral neck fracture, followed by orthopedics on oral analgesia     -History of A-fib with sick sinus syndrome status post pacemaker currently on telemetry     -Acute on chronic normocytic anemia.  Order iron studies     -Benign prostatic hyperplasia 04/13/2023 , patient was previously on tamsulosin and finasteride     -Abdominal aortic aneurysm (HCC) 07/07/2016      -History of  atrial fibrillation with sick sinus syndrome status post pacemaker placement home dose including anticoagulation with apixaban     -Dysphagia (as per patient)  can consider speech therapy if clinically indicated    PLAN:  Renal function continues to worsen  Patient was given furosemide overnight due to worsening dyspnea and decreased urine output, possible his dyspnea might be related to aspiration pneumonia on the setting of fever spikes.   I would avoid diuretics and I will place him in a gentle IV fluids as patient has been unable to drink and eat properly,  while hopefully getting speech therapy evaluation.   Noticed hyperkalemia order Lokelma and gentle hydration    Discussed with nursing staff    Thank you for involving us in the care of Jonathan Brown.  Please feel free to call with any questions.    Samy Laurent MD  12/28/23  09:38 [facility]    [facility]  [phone]    Please note that portions of this note were completed with a voice recognition program.

## 2023-12-28 NOTE — PLAN OF CARE
Goal Outcome Evaluation:   Bp stable.  Requiring more 02 than beginning of shift.  Decreased urine output.

## 2023-12-28 NOTE — PLAN OF CARE
Goal Outcome Evaluation:     Palliative care was consulted for goals of care discussion/hospice referral. Patient presented to the ED 12/26/2023 after a fall with complaints of hip pain, imaging confirmed a fracture in the femoral neck. Patient has a past medical history of chronic kidney disease stage IV, hypertension, hyperlipidemia, COPD with chronic respiratory failure, hypothyroidism, sick sinus syndrome status post pacemaker, paroxysmal atrial fibrillation, impaired mobility and ADLs, and hearing loss.    During hospital admission orthopedics was consulted for interventions of femoral neck fracture. Patient has also had continuing decline overall with suspected aspiration pneumonia and worsening kidney failure. The patient was explained the risk of surgery for fracture by the primary team and the patient does not wish to go to have surgery.     At this time, the Palliative care team was asked to come speak with the patient about his goals for care. Sarah GOULD and Wolfgang PIERCE introduced themselves and inpatient palliative services. We spoke with the patient regarding his procedure for his fracture, patient continued to reiterate that he does not want to have the surgery. Sarah was very honest with the patient about his functional status if he does not have the surgery and patient states he is comfortable with being bed bound, will have family come and take care of him and he is agreeable to hospice services at this time.     After this conversation Sarah called the patient's sister; Jenny (PONANCY) to discuss previous conversation with the patient. Jenny was agreeable and accepting of the patient's wishes to refuse the surgical intervention and proceed with hospice. However, she states that prior to this admission patient was living at home alone and it would not be possible for someone to continuously care for the patient at home. Sarah continued to explain the patient's options regarding placement such as  LTC with hospice services or [facility] is approved. Jenny agreed and plans to speak with the patient's step daughter;Kaci (POA) to discuss options.     Wolfgang PIERCE placed a referral with HCP for placement at the [facility] and Ilan CHAN sent referrals to surrounding LTC facilities.    At this time, there will be no changes to the current treatment plan but we have confirmed patient does not want to have surgery to fix femoral neck fracture. Palliative care will continue to follow this patient and aid in all aspects of care at this time.

## 2023-12-28 NOTE — CONSULTS
[facility]    INPATIENT PALLIATIVE CARE CONSULT      Name:  Jonathan Brown   Age:  89 y.o.  Sex:  male  :  1933  MRN:  [id number]   Visit Number:  [id number]  Date of Service:  23  Date of Admission:  2023  Primary Care Physician:  Sri Bangura MD    Consulting Physician:  Dr. Packer    Reason For Consult:  Introduction to Palliative services, Goals of care discussions , Support during serious illness, Hospice information , and Symptom management     History of Present Illness:  Jonathan Brown is a 89 y.o. male with past medical history of CKD stg IV, hypertension, hyperlipidemia, COPD with chronic respiratory failure, hypothyroidism, sick sinus syndrome s/p pacemaker placement, paroxysmal atrial fibrillation, impaired mobility and ADLs, macular degeneration, hearing loss.  Patient presented to [facility] on 20[facility] secondary to fall with right hip pain.  Per chart review, patient with generalized malaise for the past few days with cough/congestion.  ED workup noted HDS, 95% on 4L.  WBC 10, hgb 8.7, procalcitonin 0.07, proBNP 1600.  Creatinine 2.68, K 5.  COVID/flu negative.  UA with small leukocyte estrace and 2+ bacteria.  CT cervical, head, and knee XR reassuring.  Bilateral hip XR with evidence of right femoral neck fracture with displacement noted.  CXR with chronic findings.  He was given pain control, bronchodilators, steroids.  Orthopedics to see in consult.  He was admitted to the primary medicine service for continued evaluation and management.  Hospital course complicated by increasing oxygen needs, noted to have sever aspiration by speech therapy evaluation and was made NPO.  23 patient febrile, Tmax 100.8, initiated on empiric antibiotics with Zosyn.  Additionally given diuretics and breathing treatments.  Renal function declining, nephrology consulted and following.  Patient expressing no desire to pursue surgical intervention.   Palliative care consulted to further review C.  After speaking directly with the primary medicine team, met with patient at bedside.  He was difficult to understand, dentures poor fitting as well.   I asked if I could speak with his sister-in-law to supplement history, which he was agreeable to.      Prior to admission, patient living independently, however requiring significant assistance.  Jenny reports that approximately 4 years ago, his spouse passed away in the nursing home and she began assisting patient with things in the home.  He was able to ambulate very small distances, utilizing assistance device most of the time.  This included both a wheelchair and walker, dependent upon the distance.  He had been having falls at home.  He was able to provide personal care, continent at baseline.  Appetite has been declining with associated weight loss noted. Patient and family agreeable to continued palliative care follow up and discussions regarding goals of care and advance care planning.     Review of Systems   Constitutional:  Positive for activity change, appetite change and fatigue.   Musculoskeletal:         Hip pain   Neurological:  Positive for weakness.        Medical History:  Past Medical History:   Diagnosis Date    Anemia     Arthritis     Asthma     Blood clotting disorder     COPD (chronic obstructive pulmonary disease)     H/O emphysema     Hiatal hernia     History of blood transfusion     Hyperlipidemia     Hypertension     Kidney infection     Kidney stones     Positive TB test     Thyroid disease       Past Surgical History:   Procedure Laterality Date    ABDOMINAL AORTIC ANEURYSM REPAIR      CARDIAC ELECTROPHYSIOLOGY PROCEDURE N/A 4/8/2019    Procedure: Device Implant;  Surgeon: Dom Gallardo MD;  Location: [facility] CATH INVASIVE LOCATION;  Service: Cardiology    HERNIA REPAIR      KNEE SURGERY       Family History   Problem Relation Age of Onset    No Known Problems Mother     No  Known Problems Father     Bone cancer Sister     Lung cancer Brother     Colon cancer Neg Hx      Allergies: [facility] Scheduled Medications:    Current Facility-Administered Medications:     acetaminophen (TYLENOL) tablet 650 mg, 650 mg, Oral, Q4H PRN **OR** acetaminophen (TYLENOL) 160 MG/5ML oral solution 650 mg, 650 mg, Oral, Q4H PRN **OR** acetaminophen (TYLENOL) suppository 650 mg, 650 mg, Rectal, Q4H PRN, Leslie Packer DO, 650 mg at 12/28/23 0426    aluminum-magnesium hydroxide-simethicone (MAALOX MAX) 400-400-40 MG/5ML suspension 15 mL, 15 mL, Oral, Q6H PRN, Leslie Packer DO    sennosides-docusate (PERICOLACE) 8.6-50 MG per tablet 2 tablet, 2 tablet, Oral, BID, 2 tablet at 12/27/23 0945 **AND** polyethylene glycol (MIRALAX) packet 17 g, 17 g, Oral, Daily PRN **AND** bisacodyl (DULCOLAX) EC tablet 5 mg, 5 mg, Oral, Daily PRN **AND** bisacodyl (DULCOLAX) suppository 10 mg, 10 mg, Rectal, Daily PRN, Leslie Packer DO    budesonide-formoterol (SYMBICORT) 160-4.5 MCG/ACT inhaler 2 puff, 2 puff, Inhalation, BID - RT, Leslie Packer DO, 2 puff at 12/28/23 0715    HYDROcodone-acetaminophen (NORCO) 5-325 MG per tablet 1 tablet, 1 tablet, Oral, Q4H PRN, Leslie Packer DO, 1 tablet at 12/27/23 1813    ipratropium-albuterol (DUO-NEB) nebulizer solution 3 mL, 3 mL, Nebulization, Q4H PRN, Leslie Packer DO    levothyroxine (SYNTHROID, LEVOTHROID) tablet 100 mcg, 100 mcg, Oral, Q AM, Leslie Packer DO, 100 mcg at 12/27/23 0637    melatonin tablet 5 mg, 5 mg, Oral, Nightly PRN, Leslie Packer DO    midodrine (PROAMATINE) tablet 5 mg, 5 mg, Oral, TID PACO, Samy Laurent MD    montelukast (SINGULAIR) tablet 10 mg, 10 mg, Oral, Nightly, Leslie Packer DO, 10 mg at 12/26/23 2706    nitroglycerin (NITROSTAT) SL tablet 0.4 mg, 0.4 mg, Sublingual, Q5 Min PRN, Leslie Packer DO    ondansetron ODT (ZOFRAN-ODT)  "disintegrating tablet 4 mg, 4 mg, Oral, Q6H PRN **OR** ondansetron (ZOFRAN) injection 4 mg, 4 mg, Intravenous, Q6H PRN, Leslie Packer DO    Pharmacy to Dose Zosyn, , Does not apply, Continuous PRN, Leslie Packer DO    piperacillin-tazobactam (ZOSYN) 3.375 g in iso-osmotic dextrose 50 ml (premix), 3.375 g, Intravenous, Q12H, Leslie Packer DO    predniSONE (DELTASONE) tablet 40 mg, 40 mg, Oral, Daily With Breakfast, Leslie Packer DO, 40 mg at 12/27/23 0945    sodium chloride 0.9 % flush 10 mL, 10 mL, Intravenous, PRN, Leslie Packer,     sodium chloride 0.9 % flush 10 mL, 10 mL, Intravenous, Q12H, Leslie Packer DO, 10 mL at 12/28/23 0811    sodium chloride 0.9 % flush 10 mL, 10 mL, Intravenous, PRN, Leslie Packer,     sodium chloride 0.9 % infusion 40 mL, 40 mL, Intravenous, PRN, Leslie Packer,     sodium chloride 0.9 % infusion, 60 mL/hr, Intravenous, Continuous, Samy Laurent MD, Last Rate: 60 mL/hr at 12/28/23 0958, 60 mL/hr at 12/28/23 0958    sodium zirconium cyclosilicate (LOKELMA) pack 10 g, 10 g, Oral, TID, Samy Laurent MD  I have utilized all immediate resources to obtain, update, or review the patient's current medications (including all prescription, over-the-counter products, herbals, and/or nutritional supplements).      Vitals: /65   Pulse 73   Temp 97.6 °F (36.4 °C) (Axillary)   Resp 12   Ht 180.3 cm (71\")   Wt 68.1 kg (150 lb 2.1 oz)   SpO2 98%   BMI 20.94 kg/m²     Intake/Output Summary (Last 24 hours) at 12/28/2023 1417  Last data filed at 12/28/2023 1300  Gross per 24 hour   Intake 969 ml   Output 725 ml   Net 244 ml       Physical Exam  Vitals and nursing note reviewed.   Constitutional:       Appearance: He is ill-appearing.      Comments: Frail, thin, elderly male lying in bed   HENT:      Head: Normocephalic and atraumatic.   Eyes:      Conjunctiva/sclera: Conjunctivae normal.      " Pupils: Pupils are equal, round, and reactive to light.   Cardiovascular:      Rate and Rhythm: Normal rate and regular rhythm.   Pulmonary:      Effort: Pulmonary effort is normal.      Breath sounds: Normal breath sounds.   Musculoskeletal:      Cervical back: Normal range of motion and neck supple.   Skin:     General: Skin is warm and dry.      Capillary Refill: Capillary refill takes less than 2 seconds.   Neurological:      Mental Status: He is alert and oriented to person, place, and time.   Psychiatric:         Behavior: Behavior normal.          Diagnostics Review:  Laboratory Data:  Results from last 7 days   Lab Units 12/28/23  0434 12/27/23  0434 12/26/23  1216   SODIUM mmol/L 136 138 139   POTASSIUM mmol/L 5.3* 4.9 5.0   CHLORIDE mmol/L 101 102 101   CO2 mmol/L 20.9* 22.2 24.8   BUN mg/dL 68* 48* 44*   CREATININE mg/dL 3.11* 2.77* 2.68*   CALCIUM mg/dL 8.5 9.2 9.5   ALBUMIN g/dL  --   --  4.4   BILIRUBIN mg/dL  --   --  0.7   ALK PHOS U/L  --   --  103   ALT (SGPT) U/L  --   --  11   AST (SGOT) U/L  --   --  19   GLUCOSE mg/dL 158* 162* 127*     Results from last 7 days   Lab Units 12/28/23  0434 12/27/23  0434 12/26/23  1216   WBC 10*3/mm3 9.10 12.35* 10.49   HEMOGLOBIN g/dL 8.0* 8.2* 8.7*   HEMATOCRIT % 25.1* 26.3* 27.9*   PLATELETS 10*3/mm3 128* 126* 128*         Results from last 7 days   Lab Units 12/26/23  1327   PH, ARTERIAL pH units 7.409   PO2 ART mm Hg 58.9*   PCO2, ARTERIAL mm Hg 38.7   HCO3 ART mmol/L 24.4     Results from last 7 days   Lab Units 12/26/23  1350   COLOR UA  Yellow   GLUCOSE UA  Negative   KETONES UA  Negative   BLOOD UA  Negative   LEUKOCYTES UA  Small (1+)*   PH, URINE  6.0   BILIRUBIN UA  Negative   UROBILINOGEN UA  0.2 E.U./dL     Pain Management Panel  More data may exist         Latest Ref Rng & Units 10/19/2021 10/9/2015   Pain Management Panel   Creatinine, Urine mg/dL 74.5  86.9      Results from last 7 days   Lab Units 12/26/23  1350   URINECX  No growth      Nutritional Status:   Results from last 7 days   Lab Units 12/26/23  1216   ALBUMIN g/dL 4.4     Body mass index is 20.94 kg/m².  Documented weights    12/26/23 1213 12/26/23 1705 12/27/23 0400 12/28/23 0410   Weight: 64.4 kg (142 lb) 68.6 kg (151 lb 3.8 oz) 68.4 kg (150 lb 12.7 oz) 68.1 kg (150 lb 2.1 oz)        Radiology:  XR Chest 1 View    Result Date: 12/27/2023  FINAL REPORT TECHNIQUE: A single view of the chest was obtained. CLINICAL HISTORY: low sats FINDINGS: There is a left chest wall implanted cardiac device.  There is pulmonary venous congestion with diffuse interstitial opacities. There is no pneumothorax or significant pleural effusion. There is borderline cardiomegaly.     Lung findings could represent pulmonary edema or viral pneumonia. Authenticated and Electronically Signed by Garo Black MD on 12/27/2023 09:53:39 PM    CT Cervical Spine Without Contrast    Result Date: 12/26/2023  PROCEDURE: CT CERVICAL SPINE WO CONTRAST-  HISTORY: Neck trauma (Age >= 65y), neck pain  TECHNIQUE: Thin section axial CT with sagittal and coronal reconstructions  FINDINGS: No fracture is present. Alignment is normal.  No bony canal stenosis is seen. Mild diffuse degenerative disc disease is present. Mild facet arthropathy is noted.      Negative CT evaluation of the cervical spine for acute bony injury.    This study was performed with techniques to keep radiation doses as low as reasonably achievable (ALARA). Individualized dose reduction techniques using automated exposure control or adjustment of vA and/or kV according to the patient size were employed.  This report was signed and finalized on 12/26/2023 2:34 PM by Niels Sierra MD.      CT Head Without Contrast    Result Date: 12/26/2023  PROCEDURE: CT HEAD WO CONTRAST-  HISTORY: Head trauma, minor (Age >= 65y), pain  COMPARISON: 7/15/2022  TECHNIQUE: Noncontrast exam  FINDINGS: Advanced atrophy and chronic ischemic white matter changes are noted.  No  cortical edema is present. There is no mass or hemorrhage. Ventricles are normal.  Bone windows show no skull fracture or obvious destructive lesion.      1. No acute intracranial abnormality or obvious mass. 2. Atrophy and chronic ischemic white matter changes as above.   This study was performed with techniques to keep radiation doses as low as reasonably achievable (ALARA). Individualized dose reduction techniques using automated exposure control or adjustment of vA and/or kV according to the patient size were employed.    This report was signed and finalized on 12/26/2023 2:31 PM by Niels Sierra MD.      XR Hips Bilateral With or Without Pelvis 3-4 View    Result Date: 12/26/2023  PROCEDURE: XR HIPS BILATERAL W OR WO PELVIS 3-4 VIEW-  THREE VIEW  HISTORY: trauma  FINDINGS:  Three views show fracture of the subcapital right femoral neck.  There is moderate displacement. Left proximal femur is intact.  The joint spaces appear normal.       Subcapital right femoral neck fracture as above.    This report was signed and finalized on 12/26/2023 2:19 PM by Niels Sierra MD.      XR Knee 3 View Right    Result Date: 12/26/2023  PROCEDURE: XR KNEE 3 VW RIGHT-  THREE VIEW  HISTORY: trauma, pain  FINDINGS:  Three views show no evidence of an acute, displaced fracture or dislocation of the visualized bony architecture.  The joint spaces appear normal. Generalized osteopenia is present.      Unremarkable exam.     This report was signed and finalized on 12/26/2023 2:18 PM by Niels Sierra MD.      XR Chest 1 View    Result Date: 12/26/2023  Chest single view  COMPARISON: Chest from 4-8-2019  FINDINGS: Cardiac silhouette is of normal size. Left subclavian pacemaker is present.  Mild linear density is identified in the perihilar regions bilaterally, consistent with atelectasis. No localized airspace infiltrates are seen.      1. Chronic changes and mild perihilar atelectasis, as described. 2. No evidence of acute infiltrate.   This report was signed and finalized on 12/26/2023 1:33 PM by Erlin Duncan MD.         Goals of Care/Advance Care Planning:  Advance Care Planning     1. Determination of Decisional Capacity:  Decisional capacity: YES, would benefit from help of POA with complex medical decision making     2. Advanced Directives:  I have personally reviewed Advance Directives on file    Healthcare surrogate/legal decision maker: Kaci (step daughter) and Jenny (sister in law)  Surrogate/decision maker understands role and will honor individual's decisions: YES    3. Patient & Family Meeting:  Members present at meeting Patient, Staci Omar, Sarah Cm.  I spoke with both Jenny and Kaci separately by phone.   Meeting took place at [facility] ICU     4. Summary of the discussion:  After generalized introductions, introduced the role of palliative care in assisting with complex medical decision making, goals of care discussions, and symptom management/supportive care.  Patient is  4 years ago, two biological sons that live in [facility] [place] [facility],  not active in his health care.  He has been close with his step-daughter Kaci who has been very active in his care, especially since the passing of his spouse.  Additionally, his sister in law Jenny, has also been a supporter in his care.  Patient reports he worked many jobs throughout his life, recounting many professions over the years.    I reviewed acute and chronic conditions, medical plan in place as per the primary team, including recommendations per ortho.  Patient expresses understanding, repeatedly voicing that he does not want to proceed with surgery.  He voices desire to go home.  I reviewed generalized trajectory, including that given his fracture which will yield bedridden status, symptoms of dysphagia with concerns for aspiration, and worsening renal function; overall prognosis is very poor with limited life expectancy.  Patient expressing that he feels that he has  had a good life, is almost 89 years old.  I introduced Hospice services, which patient expresses familiarity with.  He is agreeable to Hospice referral, however would prefer to be at home.   I contacted Jenny by phone and reviewed HPI.  She expresses insight regaridng patient's current condition and associated prognosis.  Unfortunately, 24/7 care will not be available in the home setting.  I reviewed options with Hospice, including referral to the compassionate care center.  She wishes to speak with Kaci as well.    I returned to bedside to inform patient that unfortunately 24/7 care will not be available in the home, further reviewing options such as the compassionate care center.  He is agreeable, familiar with the center.  I contacted Kaci and reviewed my conversation with the patient.  She expresses understanding, is agreeable to transfer to the center pending bed availability. I introduced comfort feeds as patient is requesting mashed potatoes.  She is unsure, however notes that patient has expressed multiple times he does not want a feeding tube.    CODE STATUS reviewed/confirmed: DNR / DNI , no artificial nutrition   Baseline Functional Status: Palliative Performance Scale Score: Performance 50% based on the following measures: Ambulation: Mainly sit or lie down, Activity and Evidence of Disease: Unable to do any work, extensive evidence of disease, Self-Care: Considerable assistance required,  Intake: Normal or reduced, LOC: Full or confusion           Palliative Care Assessment:  Right femoral neck fracture  Acute on chronic hypoxic respiratory failure  Dysphagia with concerns for aspiration   CKD stg IV  Impaired mobility and ADLs  Hearing loss  Macular degeneration     Recommendations/Plan:  - CODE STATUS reviewed/confirmed: DNR / DNI , no artificial nutrition   - GOC discussions today yield desire to pursue comfort focused care, agreeable to Hospice services and has been referred to [facility]  - Received confirmation that patient has been accepted to the [facility] pending bed availability in the morning, I discussed this directly with family who are in agreement  - Prognosticate likely days to a few weeks given worsening renal function with poor urine output, concerns for continued aspiration with associated worsening hypoxic respiratory failure, hip fracture patient is now bedridden, poor oral intake  - Patient likely for hospice under general criteria given life limiting condition, treatment goals are for comfort rather than cure, patient with documented terminal disease, decline in nutritional status, clinical progression of disease, functional status decline, KPS/PPS <70  - Current Functional Status: Palliative Performance Scale Score: Performance 30% based on the following measures: Ambulation: Totally bed bound, Activity and Evidence of Disease: Unable to do any work, extensive evidence of disease, Self-Care: Total care required,  Intake: Reduced, LOC: Full, drowsy or confusion  - Palliative care will continue to follow/support patient and family    Recommendations:  - Patient does not want artificial nutrition, would recommend comfort feeding if patient/family are agreeable  - Continue with pain management with hydrocodone, consider IV hydromorphone if unable to tolerate PO medications  - Consider topical lidocaine patch to hip for added pain control  - Continue bowel regimen given concurrent opiate use  - Continue FC upon transfer to [facility] for palliative measure      I appreciate the opportunity to participate in the care of Mr./Ms. Jonathan Brown.  Please do not hesitate to contact me with any questions or concerns.      I spent 110 total minutes conducting chart review for relevant information, face to face assessment, counseling patient and/or family, and coordination of care.  10 minutes spent discussing advanced care planning.  Part of this note may be an electronic  transcription/translation of spoken language to printed text using the Dragon Dictation System.       Saarh Cm PA-C  12/28/23  14:17 EST

## 2023-12-28 NOTE — PROGRESS NOTES
Orthopedic progress report    Subjective: Patient is a history of right hip femoral neck fracture.  Surgery was discussed as an alternative with the family and the patient.  They have yet to determine if they wish to pursue surgery.  He has developed multiple medical concerns at this time.    Objective: Right hip: Pain to palpation and with passive hip motion.  Mild edema right lower Extremity.  Negative Homans.    Assessment: Right femoral neck fracture.    Plan: At this time patient is going to continue medical management through the ICU.  If the family wishes to pursue surgical intervention, we will consider tomorrow with Dr. Oliveros.  We will follow his hospital course.    All questions were answered.  This patient was evaluated and treated under the supervision of Dr Diego Oliveros. Dictated by Niels Light PA-C.

## 2023-12-28 NOTE — PROGRESS NOTES
Pharmacokinetic Consult - Piperacillin/Tazobactam Dosing  Jonathan Brown is a 89 y.o. male who has been consulted to dose Piperacillin/Tazobactam for  pneumonia .    Current Antimicrobial Therapy    Anti-Infectives (From admission, onward)      Ordered     Dose/Rate Route Frequency Start Stop    12/28/23 0759  piperacillin-tazobactam (ZOSYN) 3.375 g in iso-osmotic dextrose 50 ml (premix)        Ordering Provider: Leslie Packer DO    3.375 g  over 4 Hours Intravenous Every 12 Hours 12/28/23 1600 01/02/24 1559    12/28/23 0759  piperacillin-tazobactam (ZOSYN) 3.375 g in iso-osmotic dextrose 50 ml (premix)        Ordering Provider: Leslie Packer DO    3.375 g  over 30 Minutes Intravenous Once 12/28/23 0845      12/28/23 0751  Pharmacy to Dose Zosyn        Ordering Provider: Leslie Packer DO     Does not apply Continuous PRN 12/28/23 0751 01/02/24 0750            Microbiology Results (last 10 days)       Procedure Component Value - Date/Time    Respiratory Panel PCR w/COVID-19(SARS-CoV-2) IMMANUEL/JACINTO/DANK/PAD/COR/KATHY In-House, NP Swab in UTM/VTM, 2 HR TAT - Swab, Nasopharynx [[id number]]  (Normal) Collected: 12/28/23 0637    Lab Status: Final result Specimen: Swab from Nasopharynx Updated: 12/28/23 0745     ADENOVIRUS, PCR Not Detected     Coronavirus 229E Not Detected     Coronavirus HKU1 Not Detected     Coronavirus NL63 Not Detected     Coronavirus OC43 Not Detected     COVID19 Not Detected     Human Metapneumovirus Not Detected     Human Rhinovirus/Enterovirus Not Detected     Influenza A PCR Not Detected     Influenza B PCR Not Detected     Parainfluenza Virus 1 Not Detected     Parainfluenza Virus 2 Not Detected     Parainfluenza Virus 3 Not Detected     Parainfluenza Virus 4 Not Detected     RSV, PCR Not Detected     Bordetella pertussis pcr Not Detected     Bordetella parapertussis PCR Not Detected     Chlamydophila pneumoniae PCR Not Detected     Mycoplasma pneumo by PCR Not  Detected    Narrative:      In the setting of a positive respiratory panel with a viral infection PLUS a negative procalcitonin without other underlying concern for bacterial infection, consider observing off antibiotics or discontinuation of antibiotics and continue supportive care. If the respiratory panel is positive for atypical bacterial infection (Bordetella pertussis, Chlamydophila pneumoniae, or Mycoplasma pneumoniae), consider antibiotic de-escalation to target atypical bacterial infection.    COVID-19 and FLU A/B PCR, 1 HR TAT - Swab, Nasopharynx [[id number]]  (Normal) Collected: 12/26/23 1217    Lab Status: Final result Specimen: Swab from Nasopharynx Updated: 12/26/23 1254     COVID19 Not Detected     Influenza A PCR Not Detected     Influenza B PCR Not Detected    Narrative:      Fact sheet for providers: https://www.fda.gov/media/823203/download    Fact sheet for patients: https://www.fda.gov/media/074512/download    Test performed by PCR.             Allergies  Atorvastatin    Relevant clinical data and objective history reviewed:  Creatinine   Date Value Ref Range Status   12/28/2023 3.11 (H) 0.76 - 1.27 mg/dL Final     Estimated Creatinine Clearance: 15.2 mL/min (A) (by C-G formula based on SCr of 3.11 mg/dL (H)).  I/O last 3 completed shifts:  In: 1027 [P.O.:460; I.V.:567]  Out: 2070 [Urine:2070]  Patient weight: 68.1 kg (150 lb 2.1 oz)    Asessment/Plan  Initiate Piperacillin/Tazobactam 3.375 gm IV every 12 hours  Pharmacy will monitor Mr. Brown's renal function and clinical status and adjust the Piperacillin/Tazobactam dose and/or frequency as needed.    Thank you for the consult,     Karsten Wright PharmD, BCPS   12/28/2023  08:02 EST

## 2023-12-29 VITALS
TEMPERATURE: 97.9 F | RESPIRATION RATE: 18 BRPM | OXYGEN SATURATION: 100 % | BODY MASS INDEX: 20.99 KG/M2 | DIASTOLIC BLOOD PRESSURE: 58 MMHG | HEART RATE: 80 BPM | SYSTOLIC BLOOD PRESSURE: 114 MMHG | HEIGHT: 71 IN | WEIGHT: 149.91 LBS

## 2023-12-29 LAB
GLUCOSE BLDC GLUCOMTR-MCNC: 167 MG/DL (ref 70–130)
GLUCOSE BLDC GLUCOMTR-MCNC: 174 MG/DL (ref 70–130)

## 2023-12-29 PROCEDURE — 94761 N-INVAS EAR/PLS OXIMETRY MLT: CPT

## 2023-12-29 PROCEDURE — 99238 HOSP IP/OBS DSCHRG MGMT 30/<: CPT | Performed by: FAMILY MEDICINE

## 2023-12-29 PROCEDURE — 94664 DEMO&/EVAL PT USE INHALER: CPT

## 2023-12-29 PROCEDURE — 94799 UNLISTED PULMONARY SVC/PX: CPT

## 2023-12-29 PROCEDURE — 25010000002 PIPERACILLIN SOD-TAZOBACTAM PER 1 G: Performed by: FAMILY MEDICINE

## 2023-12-29 PROCEDURE — 25010000002 METHYLPREDNISOLONE PER 125 MG: Performed by: FAMILY MEDICINE

## 2023-12-29 PROCEDURE — 25010000002 MORPHINE PER 10 MG: Performed by: FAMILY MEDICINE

## 2023-12-29 PROCEDURE — 82948 REAGENT STRIP/BLOOD GLUCOSE: CPT

## 2023-12-29 RX ORDER — MORPHINE SULFATE 20 MG/5ML
5 SOLUTION ORAL EVERY 4 HOURS PRN
Qty: 22.5 ML | Refills: 0 | Status: SHIPPED | OUTPATIENT
Start: 2023-12-29 | End: 2024-01-01

## 2023-12-29 RX ORDER — IPRATROPIUM BROMIDE AND ALBUTEROL SULFATE 2.5; .5 MG/3ML; MG/3ML
3 SOLUTION RESPIRATORY (INHALATION) EVERY 4 HOURS PRN
Start: 2023-12-29

## 2023-12-29 RX ORDER — BISACODYL 10 MG
10 SUPPOSITORY, RECTAL RECTAL DAILY PRN
Start: 2023-12-29

## 2023-12-29 RX ORDER — POLYETHYLENE GLYCOL 3350 17 G/17G
17 POWDER, FOR SOLUTION ORAL DAILY PRN
Start: 2023-12-29

## 2023-12-29 RX ORDER — AMOXICILLIN 250 MG
2 CAPSULE ORAL 2 TIMES DAILY
Start: 2023-12-29

## 2023-12-29 RX ORDER — BISACODYL 5 MG/1
5 TABLET, DELAYED RELEASE ORAL DAILY PRN
Start: 2023-12-29

## 2023-12-29 RX ADMIN — PIPERACILLIN SODIUM AND TAZOBACTAM SODIUM 3.38 G: 3; .375 INJECTION, SOLUTION INTRAVENOUS at 05:09

## 2023-12-29 RX ADMIN — MORPHINE SULFATE 2 MG: 2 INJECTION, SOLUTION INTRAMUSCULAR; INTRAVENOUS at 05:27

## 2023-12-29 RX ADMIN — MORPHINE SULFATE 2 MG: 2 INJECTION, SOLUTION INTRAMUSCULAR; INTRAVENOUS at 11:09

## 2023-12-29 RX ADMIN — BUDESONIDE AND FORMOTEROL FUMARATE DIHYDRATE 2 PUFF: 160; 4.5 AEROSOL RESPIRATORY (INHALATION) at 07:30

## 2023-12-29 RX ADMIN — METHYLPREDNISOLONE SODIUM SUCCINATE 60 MG: 125 INJECTION, POWDER, FOR SOLUTION INTRAMUSCULAR; INTRAVENOUS at 05:09

## 2023-12-29 NOTE — CASE MANAGEMENT/SOCIAL WORK
Case Management/Social Work    Patient Name:  Jonathan Brown  YOB: 1933  MRN: [id number]  Admit Date:  12/26/2023        08:38 EST  Patient to transfer to [facility] today.      Electronically signed by:  Ilan Najera RN  12/29/23 08:38 EST

## 2023-12-29 NOTE — CASE MANAGEMENT/SOCIAL WORK
Case Management Discharge Note           Provided Post Acute Provider List?: Yes    Selected Continued Care - Admitted Since 12/26/2023       Destination Coordination complete.      Service Provider Selected Services Address Phone Fax Patient Preferred    [facility] [address] [facility] [place] [address] [phone] --              Durable Medical Equipment    No services have been selected for the patient.                Dialysis/Infusion    No services have been selected for the patient.                Home Medical Care    No services have been selected for the patient.                Therapy    No services have been selected for the patient.                Community Resources    No services have been selected for the patient.                Community & DME    No services have been selected for the patient.                    Transportation Services  Ambulance: [facility]    Final Discharge Disposition Code: 51 - hospice medical facility

## 2023-12-29 NOTE — PROGRESS NOTES
[facility] Progress Note      Patient Name: Jonathan Brown  : 1933  MRN: [id number]  Primary Care Physician:  Sri Bangura MD  Date of admission: 2023    Subjective     Interval History:       Informed by nursing staff that family wanted to proceed to comfort measures          Review of Systems:   As noted above    Objective     Vitals:   Temp:  [97.4 °F (36.3 °C)-98.7 °F (37.1 °C)] 97.9 °F (36.6 °C)  Heart Rate:  [] 80  Resp:  [12-18] 18  BP: ()/(46-85) 114/58  Flow (L/min):  [4-6] 4    Intake/Output Summary (Last 24 hours) at 2023 0954  Last data filed at 2023 0650  Gross per 24 hour   Intake 1409 ml   Output 1650 ml   Net -241 ml       Physical Exam:      Constitutional: Chronically ill and deconditioned most from the oxygen bilateral temporal wasting  HEENT: Sclera anicteric, no conjunctival injection.  Dentures in place  Neck: Supple, no thyromegaly, no lymphadenopathy, trachea at midline, no JVD  Respiratory: Fine crackles bibasilar  Cardiovascular: RRR, systolic murmur.  Left upper chest with pacemaker in place  Gastrointestinal: Positive bowel sounds, abdomen is soft, nontender and nondistended  : Lemons cath in place with gross hematuria  Musculoskeletal: Right femoral deformity  Psychiatric: Appropriate affect, cooperative  Neurologic: Oriented x3, moving all extremities, normal speech and mental status  Skin: Warm and dry           Scheduled Meds:     budesonide-formoterol, 2 puff, Inhalation, BID - RT  levothyroxine, 100 mcg, Oral, Q AM  methylPREDNISolone sodium succinate, 60 mg, Intravenous, Q12H  midodrine, 5 mg, Oral, TID AC  piperacillin-tazobactam, 3.375 g, Intravenous, Q12H  senna-docusate sodium, 2 tablet, Oral, BID  sodium chloride, 10 mL, Intravenous, Q12H      IV Meds:   Pharmacy to Dose Zosyn,   sodium chloride, 60 mL/hr, Last Rate: 60 mL/hr (23 8715)        Results Reviewed:   I have personally reviewed the  results from the time of this admission to 12/29/2023 09:54 EST     Results from last 7 days   Lab Units 12/28/23  0434 12/27/23 0434 12/26/23  1216   SODIUM mmol/L 136 138 139   POTASSIUM mmol/L 5.3* 4.9 5.0   CHLORIDE mmol/L 101 102 101   CO2 mmol/L 20.9* 22.2 24.8   BUN mg/dL 68* 48* 44*   CREATININE mg/dL 3.11* 2.77* 2.68*   CALCIUM mg/dL 8.5 9.2 9.5   BILIRUBIN mg/dL  --   --  0.7   ALK PHOS U/L  --   --  103   ALT (SGPT) U/L  --   --  11   AST (SGOT) U/L  --   --  19   GLUCOSE mg/dL 158* 162* 127*       Estimated Creatinine Clearance: 15.2 mL/min (A) (by C-G formula based on SCr of 3.11 mg/dL (H)).    Results from last 7 days   Lab Units 12/26/23  1216   MAGNESIUM mg/dL 2.4             Results from last 7 days   Lab Units 12/28/23  0434 12/27/23 0434 12/26/23  1216   WBC 10*3/mm3 9.10 12.35* 10.49   HEMOGLOBIN g/dL 8.0* 8.2* 8.7*   PLATELETS 10*3/mm3 128* 126* 128*             Assessment / Plan     ASSESSMENT:    - Acute kidney injury likely prerenal patient has been feeling lightheaded prior to his falls while taking ACE inhibitor's/currently placed on hold. Upon admission Cr of 2.6 .> 3.1  Lemons catheter in place . Avoid nephrotoxins. Strict I/O      -Chronic kidney disease stage IV ( baseline 1.8-2.1 )  .  Likely secondary to hypertensive nephrosclerosis ,heavy tobacco abuse.chronic NSAID use, and reduced renal mass. .  Urinalysis pyuria previous microalbuminuria 40 mg.  CT scan abdomen pelvis showing bilateral atrophic kidneys without hydronephrosis and unremarkable bladder    -Hyperkalemia ordered Lokelma and IV fluids, will follow K trend     -History of heavy tobacco abuse with chronic obstructive pulm disease images studies showing emphysema.  On chronic supplemental oxygen, inhalers followed by respiratory therapy.  Possible aspiration pneumonia ?  As per primary team     -History of hypertension .  Home dose included amlodipine, irbesartan doxazosin  and tamsulosin     -Right displaced femoral neck  fracture, followed by orthopedics on oral analgesia     -History of A-fib with sick sinus syndrome status post pacemaker currently on telemetry     -Acute on chronic normocytic anemia.  Order iron studies     -Benign prostatic hyperplasia 04/13/2023 , patient was previously on tamsulosin and finasteride     -Abdominal aortic aneurysm (HCC) 07/07/2016      -History of  atrial fibrillation with sick sinus syndrome status post pacemaker placement home dose including anticoagulation with apixaban     -Dysphagia (as per patient)  can consider speech therapy if clinically indicated    PLAN:  Plan to proceed with comfort measures as noted.  Nephrology team will sign off please call if you have questions regarding her  Thank you for involving us in the care of Jonathan Brown.  Please feel free to call with any questions.    Audi Morrow MD  12/29/23  09:54 [facility]    [facility]  [phone]    Please note that portions of this note were completed with a voice recognition program.

## 2023-12-29 NOTE — DISCHARGE SUMMARY
"    [facility]   DISCHARGE SUMMARY      Name:  Jonathan Brown   Age:  89 y.o.  Sex:  male  :  1933  MRN:  [id number]   Visit Number:  [id number]    Admission Date:  2023  Date of Discharge:  2023  Primary Care Physician:  Sri Bangura MD    Important issues to note:    After discussion with patient, family, POA and palliative care have elected to proceed with hospice care and transfer to [facility]    Discharge Diagnoses:     Right femoral neck fracture, POA  Acute on chronic hypoxic respiratory failure, POA   COPD with exacerbation  History of sick sinus syndrome status post pacemaker  Atrial fibrillation on Eliquis  Hearing loss  Acute on chronic kidney disease stage IV    Problem List:     Active Hospital Problems    Diagnosis  POA    **Acute hypoxic respiratory failure [J96.01]  Yes    Severe malnutrition [E43]  Yes      Resolved Hospital Problems   No resolved problems to display.     Presenting Problem:    Chief Complaint   Patient presents with    Fall    Dizziness      Consults:     Consulting Physician(s)         Provider   Role Specialty     Scott Vincent MD, SAMEERA  Consulting Physician Nephrology     Diego Oliveros MD  Consulting Physician Orthopedic Surgery          Procedures Performed:    Procedure(s):  HIP HEMIARTHROPLASTY    History of presenting illness/Hospital Course:    The patient is a chronically ill 89-year-old with medical history of chronic kidney disease stage IV, hypertension, hyperlipidemia, COPD with chronic respiratory failure, hypothyroidism, sick sinus syndrome status post pacemaker, paroxysmal atrial fibrillation, impaired mobility and ADLs, hearing loss, who had presented from home after sustaining a fall.  History obtained from ER record, patient at bedside.  No family available.  He did note he had been \"sick\" for a few days, somewhat short of breath with a cough.  Notes that earlier this morning while " ambulating he had felt lightheaded, subsequently fell and landed on his right hip and lower leg.  Unable to ambulate thereafter.  He notes he does have COPD, has not smoked in many years.  Does use inhalers at home.  Notes sister helps him some, is his POA, also has a daughter.     In the ER, he was normotensive, heart rate in the 80s, afebrile.  Saturating 95% on 4 L of oxygen.  White count 10 hemoglobin 8.7 platelets 128.  Procalcitonin 0.07 proBNP 1600 high-sensitivity troponin of 80.  Creatinine 2.68 potassium 5 negative flu COVID test.  ABG pH 7.409 pCO2 38 pO2 58.  Urinalysis with small leukocyte esterase and 2+ bacteria.  CT cervical spine without contrast unremarkable.  CT scan of the head without contrast unremarkable.  3 view right knee x-ray unremarkable.  Bilateral hip x-rays with evidence of a subcapital right femoral neck fracture with displacement noted.  Chest x-ray with chronic findings.  The patient was given pain control, bronchodilators, steroids.  Dr. Oliveros with orthopedics to see in consult.  We were asked admit thereafter.     The patient had had increasing oxygen requirements, after speech therapy evaluation he had evidence of severe aspiration and was made NPO.  Did spike fever overnight of 12/27/2023, was increasingly hypoxic and given diuretics and additional breathing treatments.  Was started on empiric antibiotics with Zosyn.     The patient's kidney function has also decreased while in the hospital.  Nephrology has been following.  After multiple discussions with the patient, his power of , other family, and hospice and palliative care team, he has elected not to proceed with any operative intervention for the hip fracture.  He does not wish to be evaluated for feeding tube placement.  The patient's primary concern at this point is to be comfortable and not in a hospital.  He is not safe to tolerate oral intake other than comfort feeds at this time.    The patient otherwise is  currently in stable condition will be transferred to compassionate care center today for further hospice care.  Family is agreeable.    Vital Signs:    Temp:  [97.4 °F (36.3 °C)-98.7 °F (37.1 °C)] 97.9 °F (36.6 °C)  Heart Rate:  [] 80  Resp:  [12-18] 18  BP: ()/(46-85) 114/58    Physical Exam:    General Appearance:  Alert and cooperative.  Ill-appearing   Head:  Atraumatic and normocephalic.   Eyes: Conjunctivae and sclerae normal, no icterus. No pallor.   Ears:  Ears with no abnormalities noted.   Throat: No oral lesions, no thrush, oral mucosa extremely dry   Neck: Supple, trachea midline, no thyromegaly.   Back:   No kyphoscoliosis present. No tenderness to palpation.   Lungs:   Diffuse crackles   Heart:  Normal S1 and S2, no murmur, no gallop, no rub. No JVD.   Abdomen:   Normal bowel sounds, no masses, no organomegaly. Soft, nontender, nondistended, no rebound tenderness.   Extremities: Externally rotated lower extremity, edema present   Pulses: Pulses palpable bilaterally.   Skin: No bleeding or rash.   Neurologic: Alert and oriented x 3. No facial asymmetry. Moves all four limbs. No tremors.     Pertinent Lab Results:     Results from last 7 days   Lab Units 12/28/23  0434 12/27/23 0434 12/26/23  1216   SODIUM mmol/L 136 138 139   POTASSIUM mmol/L 5.3* 4.9 5.0   CHLORIDE mmol/L 101 102 101   CO2 mmol/L 20.9* 22.2 24.8   BUN mg/dL 68* 48* 44*   CREATININE mg/dL 3.11* 2.77* 2.68*   CALCIUM mg/dL 8.5 9.2 9.5   BILIRUBIN mg/dL  --   --  0.7   ALK PHOS U/L  --   --  103   ALT (SGPT) U/L  --   --  11   AST (SGOT) U/L  --   --  19   GLUCOSE mg/dL 158* 162* 127*     Results from last 7 days   Lab Units 12/28/23  0434 12/27/23  0434 12/26/23  1216   WBC 10*3/mm3 9.10 12.35* 10.49   HEMOGLOBIN g/dL 8.0* 8.2* 8.7*   HEMATOCRIT % 25.1* 26.3* 27.9*   PLATELETS 10*3/mm3 128* 126* 128*         Results from last 7 days   Lab Units 12/28/23  0632 12/28/23  0434 12/27/23  2312 12/27/23 2052   CK TOTAL U/L  --   594*  --   --    HSTROP T ng/L 132*  --  113* 111*     Results from last 7 days   Lab Units 12/26/23  1216   PROBNP pg/mL 1,662.0             Results from last 7 days   Lab Units 12/26/23  1327   PH, ARTERIAL pH units 7.409   PO2 ART mm Hg 58.9*   PCO2, ARTERIAL mm Hg 38.7   HCO3 ART mmol/L 24.4     Results from last 7 days   Lab Units 12/28/23  0630 12/26/23  1350   BLOODCX  No growth at 24 hours  --    URINECX   --  No growth       Pertinent Radiology Results:    Imaging Results (All)       Procedure Component Value Units Date/Time    XR Chest 1 View [[id number]] Collected: 12/27/23 2153     Updated: 12/27/23 2154    Narrative:      FINAL REPORT    TECHNIQUE:  A single view of the chest was obtained.    CLINICAL HISTORY:  low sats    FINDINGS:  There is a left chest wall implanted cardiac device.  There is  pulmonary venous congestion with diffuse interstitial opacities.  There is no pneumothorax or significant pleural effusion.  There is borderline cardiomegaly.      Impression:      Lung findings could represent pulmonary edema or viral pneumonia.    Authenticated and Electronically Signed by Garo Black MD on  12/27/2023 09:53:39 PM    CT Cervical Spine Without Contrast [[id number]] Collected: 12/26/23 1433     Updated: 12/26/23 1436    Narrative:      PROCEDURE: CT CERVICAL SPINE WO CONTRAST-     HISTORY: Neck trauma (Age >= 65y), neck pain     TECHNIQUE: Thin section axial CT with sagittal and coronal  reconstructions     FINDINGS: No fracture is present. Alignment is normal.  No bony canal  stenosis is seen. Mild diffuse degenerative disc disease is present.  Mild facet arthropathy is noted.       Impression:      Negative CT evaluation of the cervical spine for acute bony  injury.           This study was performed with techniques to keep radiation doses as low  as reasonably achievable (ALARA). Individualized dose reduction  techniques using automated exposure control or adjustment of vA and/or  kV  according to the patient size were employed.      This report was signed and finalized on 12/26/2023 2:34 PM by Niels Sierra MD.       CT Head Without Contrast [[id number]] Collected: 12/26/23 1430     Updated: 12/26/23 1433    Narrative:      PROCEDURE: CT HEAD WO CONTRAST-     HISTORY: Head trauma, minor (Age >= 65y), pain     COMPARISON: 7/15/2022     TECHNIQUE: Noncontrast exam     FINDINGS: Advanced atrophy and chronic ischemic white matter changes are  noted.     No cortical edema is present. There is no mass or hemorrhage. Ventricles  are normal.     Bone windows show no skull fracture or obvious destructive lesion.       Impression:      1. No acute intracranial abnormality or obvious mass.   2. Atrophy and chronic ischemic white matter changes as above.        This study was performed with techniques to keep radiation doses as low  as reasonably achievable (ALARA). Individualized dose reduction  techniques using automated exposure control or adjustment of vA and/or  kV according to the patient size were employed.            This report was signed and finalized on 12/26/2023 2:31 PM by Niels Sierra MD.       XR Hips Bilateral With or Without Pelvis 3-4 View [[id number]] Collected: 12/26/23 1418     Updated: 12/26/23 1421    Narrative:      PROCEDURE: XR HIPS BILATERAL W OR WO PELVIS 3-4 VIEW-     THREE VIEW     HISTORY: trauma     FINDINGS:  Three views show fracture of the subcapital right femoral  neck.  There is moderate displacement. Left proximal femur is intact.     The joint spaces appear normal.          Impression:      Subcapital right femoral neck fracture as above.           This report was signed and finalized on 12/26/2023 2:19 PM by Niels Sierra MD.       XR Knee 3 View Right [[id number]] Collected: 12/26/23 1418     Updated: 12/26/23 1420    Narrative:      PROCEDURE: XR KNEE 3 VW RIGHT-     THREE VIEW     HISTORY: trauma, pain     FINDINGS:  Three views show no evidence of an acute,  displaced fracture  or dislocation of the visualized bony architecture.  The joint spaces  appear normal. Generalized osteopenia is present.       Impression:      Unremarkable exam.              This report was signed and finalized on 12/26/2023 2:18 PM by Niels Sierra MD.       XR Chest 1 View [[id number]] Collected: 12/26/23 1332     Updated: 12/26/23 1335    Narrative:      Chest single view     COMPARISON: Chest from 4-8-2019     FINDINGS: Cardiac silhouette is of normal size. Left subclavian  pacemaker is present.     Mild linear density is identified in the perihilar regions bilaterally,  consistent with atelectasis. No localized airspace infiltrates are seen.       Impression:      1. Chronic changes and mild perihilar atelectasis, as described.  2. No evidence of acute infiltrate.     This report was signed and finalized on 12/26/2023 1:33 PM by Erlin Duncan MD.               Echo:    Results for orders placed in visit on 09/19/23    Adult Transthoracic Echo Complete W/ Cont if Necessary Per Protocol    Interpretation Summary    Left ventricular systolic function is normal. Estimated left ventricular EF = 54% Left ventricular ejection fraction appears to be 51 - 55%.    Left ventricular wall thickness is consistent with mild concentric hypertrophy.    Left ventricular diastolic function is consistent with (grade I) impaired relaxation.    The right atrial cavity is mildly  dilated.    There is mild calcification of the aortic valve mainly affecting the non-coronary, left coronary and right coronary cusp(s).    Estimated right ventricular systolic pressure from tricuspid regurgitation is mildly elevated (35-45 mmHg).    Mild pulmonary hypertension is present.    Condition on Discharge:      Stable.    Code status during the hospital stay:    Code Status and Medical Interventions:   Ordered at: 12/26/23 180     Medical Intervention Limits:    NO intubation (DNI)    NO cardioversion     Code Status  (Patient has no pulse and is not breathing):    No CPR (Do Not Attempt to Resuscitate)     Medical Interventions (Patient has pulse or is breathing):    Limited Support     Discharge Disposition:    Hospice/Medical Facility (DC - External)    Discharge Medications:       Discharge Medications        New Medications        Instructions Start Date   bisacodyl 5 MG EC tablet  Commonly known as: DULCOLAX   5 mg, Oral, Daily PRN      bisacodyl 10 MG suppository  Commonly known as: DULCOLAX   10 mg, Rectal, Daily PRN      ipratropium-albuterol 0.5-2.5 mg/3 ml nebulizer  Commonly known as: DUO-NEB   3 mL, Nebulization, Every 4 Hours PRN      morphine 20 MG/5ML solution   5.2 mg, Oral, Every 4 Hours PRN      polyethylene glycol 17 g packet  Commonly known as: MIRALAX   17 g, Oral, Daily PRN      sennosides-docusate 8.6-50 MG per tablet  Commonly known as: PERICOLACE   2 tablets, Oral, 2 Times Daily             Continue These Medications        Instructions Start Date   apixaban 2.5 MG tablet tablet  Commonly known as: ELIQUIS   2.5 mg, Oral, Every 12 Hours Scheduled             Stop These Medications      aspirin 81 MG EC tablet     finasteride 5 MG tablet  Commonly known as: PROSCAR     furosemide 20 MG tablet  Commonly known as: LASIX     irbesartan 150 MG tablet  Commonly known as: AVAPRO     levothyroxine 100 MCG tablet  Commonly known as: SYNTHROID, LEVOTHROID     omeprazole 20 MG capsule  Commonly known as: priLOSEC     ondansetron 4 MG tablet  Commonly known as: ZOFRAN     Polyethylene Glycol powder     PRESERVISION AREDS 2 PO     simvastatin 40 MG tablet  Commonly known as: ZOCOR     tamsulosin 0.4 MG capsule 24 hr capsule  Commonly known as: FLOMAX     tiotropium 18 MCG per inhalation capsule  Commonly known as: SPIRIVA     VITAMIN B-12 PO     vitamin D3 125 MCG (5000 UT) capsule capsule            Discharge Diet:     Diet Instructions       Diet: Nothing By Mouth      Discharge Diet: Nothing By Mouth           Activity at Discharge:       Follow-up Appointments:     Contact information for follow-up providers       Sri Bangura MD .    Specialty: Family Medicine  Contact information:  [address]  [facility] [address]  [phone]                       Contact information for after-discharge care       Destination       [facility] [place] .    Service: Inpatient Hospice  Contact information:  [address]  [facility] [address]  [phone]                                 Future Appointments   Date Time Provider Department Center   2/27/2024 10:00 AM Laith Haro MD [facility]   2/27/2024 10:00 AM [facility] CARD [place] DEVICE CHECK [facility]     Test Results Pending at Discharge:    Pending Labs       Order Current Status    Blood Culture With KEYANA - Blood, Arm, Right Preliminary result               Leslie Packer DO  12/29/23  10:13 EST    Time: I spent 20 minutes on this discharge activity which included: face-to-face encounter with the patient, reviewing the data in the system, coordination of the care with the nursing staff as well as consultants, documentation, and entering orders.     Dictated utilizing Dragon dictation.

## 2023-12-29 NOTE — PLAN OF CARE
Goal Outcome Evaluation:      No distress.  Pt appeared comfortable all shift.  To compassionate care anticipated today.

## 2023-12-29 NOTE — PLAN OF CARE
Goal Outcome Evaluation:        Patient approved for bed at [facility]. Spoke with RN regarding patient transfer to [facility]. She will fax d/c summary once complete and call report to [phone] prior to d/c. Patient's step-daughter (POA) signed EMS DNR and it was placed in the chart.     12:12 EST  Patient discharged to [facility] via EMS at 1131

## 2023-12-29 NOTE — PAYOR COMM NOTE
"  D/c summary   UR Manager; Trisha Sutherland, RN  [phone] and fax [phone]      Catia Brown (89 y.o. Male)       Date of Birth   02/13/1933    Social Security Number       Address   [address] [address]    Home Phone   [phone]    MRN   [id number]       [employer]    Marital Status                               Admission Date   12/26/23    Admission Type   Emergency    Admitting Provider   Leslie Packer DO    Attending Provider   Leslie Packer DO    Department, Room/Bed   [facility] INTENSIVE CARE, I04/1       Discharge Date       Discharge Disposition   Hospice/Medical Facility (DC - External)    Discharge Destination                                 Attending Provider: Leslie Packer DO    Allergies: Atorvastatin    Isolation: None   Infection: None   Code Status: No CPR    Ht: 180.3 cm (71\")   Wt: 68 kg (149 lb 14.6 oz)    Admission Cmt: None   Principal Problem: Acute hypoxic respiratory failure [J96.01]                   Active Insurance as of 12/26/2023       Primary Coverage       Payor Plan Insurance Group Employer/Plan Group    HUMANA MEDICARE REPLACEMENT HUMANA MEDICARE REPLACEMENT [id number]       Payor Plan Address Payor Plan Phone Number Payor Plan Fax Number Effective Dates    [address] [address] [phone]  1/1/2019 - None Entered    [facility] [id number]         Subscriber Name Subscriber Birth Date Member ID       CATIA BROWN 2/13/1933 [id number]               Secondary Coverage       Payor Plan Insurance Group Employer/Plan Group    [place]        Payor Plan Address Payor Plan Phone Number Payor Plan Fax Number Effective Dates    [address] [phone]  7/7/2016 - None Entered    [facility] [id number]         Subscriber Name Subscriber Birth Date Member ID       CATIA BROWN 2/13/1933 [id number]                     Emergency Contacts        " (Rel.) Home Phone Work Phone Mobile Phone    joan morrow (Sister) -- -- [phone]    [facility] [phone] -- [phone]                 Physician Progress Notes (last 24 hours)        Audi Morrow MD at 23 0954              [facility] Progress Note      Patient Name: Jonathan Brown  : 1933  MRN: [id number]  Primary Care Physician:  Sri Bangura MD  Date of admission: 2023    Subjective     Interval History:       Informed by nursing staff that family wanted to proceed to comfort measures          Review of Systems:   As noted above    Objective     Vitals:   Temp:  [97.4 °F (36.3 °C)-98.7 °F (37.1 °C)] 97.9 °F (36.6 °C)  Heart Rate:  [] 80  Resp:  [12-18] 18  BP: ()/(46-85) 114/58  Flow (L/min):  [4-6] 4    Intake/Output Summary (Last 24 hours) at 2023 0954  Last data filed at 2023 0650  Gross per 24 hour   Intake 1409 ml   Output 1650 ml   Net -241 ml       Physical Exam:      Constitutional: Chronically ill and deconditioned most from the oxygen bilateral temporal wasting  HEENT: Sclera anicteric, no conjunctival injection.  Dentures in place  Neck: Supple, no thyromegaly, no lymphadenopathy, trachea at midline, no JVD  Respiratory: Fine crackles bibasilar  Cardiovascular: RRR, systolic murmur.  Left upper chest with pacemaker in place  Gastrointestinal: Positive bowel sounds, abdomen is soft, nontender and nondistended  : Lemons cath in place with gross hematuria  Musculoskeletal: Right femoral deformity  Psychiatric: Appropriate affect, cooperative  Neurologic: Oriented x3, moving all extremities, normal speech and mental status  Skin: Warm and dry           Scheduled Meds:     budesonide-formoterol, 2 puff, Inhalation, BID - RT  levothyroxine, 100 mcg, Oral, Q AM  methylPREDNISolone sodium succinate, 60 mg, Intravenous, Q12H  midodrine, 5 mg, Oral, TID AC  piperacillin-tazobactam, 3.375 g,  Intravenous, Q12H  senna-docusate sodium, 2 tablet, Oral, BID  sodium chloride, 10 mL, Intravenous, Q12H      IV Meds:   Pharmacy to Dose Zosyn,   sodium chloride, 60 mL/hr, Last Rate: 60 mL/hr (12/28/23 1324)        Results Reviewed:   I have personally reviewed the results from the time of this admission to 12/29/2023 09:54 EST     Results from last 7 days   Lab Units 12/28/23  0434 12/27/23  0434 12/26/23  1216   SODIUM mmol/L 136 138 139   POTASSIUM mmol/L 5.3* 4.9 5.0   CHLORIDE mmol/L 101 102 101   CO2 mmol/L 20.9* 22.2 24.8   BUN mg/dL 68* 48* 44*   CREATININE mg/dL 3.11* 2.77* 2.68*   CALCIUM mg/dL 8.5 9.2 9.5   BILIRUBIN mg/dL  --   --  0.7   ALK PHOS U/L  --   --  103   ALT (SGPT) U/L  --   --  11   AST (SGOT) U/L  --   --  19   GLUCOSE mg/dL 158* 162* 127*       Estimated Creatinine Clearance: 15.2 mL/min (A) (by C-G formula based on SCr of 3.11 mg/dL (H)).    Results from last 7 days   Lab Units 12/26/23  1216   MAGNESIUM mg/dL 2.4             Results from last 7 days   Lab Units 12/28/23  0434 12/27/23 0434 12/26/23  1216   WBC 10*3/mm3 9.10 12.35* 10.49   HEMOGLOBIN g/dL 8.0* 8.2* 8.7*   PLATELETS 10*3/mm3 128* 126* 128*             Assessment / Plan     ASSESSMENT:    - Acute kidney injury likely prerenal patient has been feeling lightheaded prior to his falls while taking ACE inhibitor's/currently placed on hold. Upon admission Cr of 2.6 .> 3.1  Lemons catheter in place . Avoid nephrotoxins. Strict I/O      -Chronic kidney disease stage IV ( baseline 1.8-2.1 )  .  Likely secondary to hypertensive nephrosclerosis ,heavy tobacco abuse.chronic NSAID use, and reduced renal mass. .  Urinalysis pyuria previous microalbuminuria 40 mg.  CT scan abdomen pelvis showing bilateral atrophic kidneys without hydronephrosis and unremarkable bladder    -Hyperkalemia ordered Lokelma and IV fluids, will follow K trend     -History of heavy tobacco abuse with chronic obstructive pulm disease images studies showing  emphysema.  On chronic supplemental oxygen, inhalers followed by respiratory therapy.  Possible aspiration pneumonia ?  As per primary team     -History of hypertension .  Home dose included amlodipine, irbesartan doxazosin  and tamsulosin     -Right displaced femoral neck fracture, followed by orthopedics on oral analgesia     -History of A-fib with sick sinus syndrome status post pacemaker currently on telemetry     -Acute on chronic normocytic anemia.  Order iron studies     -Benign prostatic hyperplasia 2023 , patient was previously on tamsulosin and finasteride     -Abdominal aortic aneurysm (HCC) 2016      -History of  atrial fibrillation with sick sinus syndrome status post pacemaker placement home dose including anticoagulation with apixaban     -Dysphagia (as per patient)  can consider speech therapy if clinically indicated    PLAN:  Plan to proceed with comfort measures as noted.  Nephrology team will sign off please call if you have questions regarding her  Thank you for involving us in the care of Jonathan Brown.  Please feel free to call with any questions.    Audi Morrow MD  23  09:54 [facility]    [facility]  [phone]    Please note that portions of this note were completed with a voice recognition program.      Electronically signed by Audi Morrow MD at 23 0943       Leslie Packer DO at 23 6164              [facility]    PROGRESS NOTE    Name:  Jonathan Brown   Age:  89 y.o.  Sex:  male  :  1933  MRN:  [id number]   Visit Number:  [id number]  Admission Date:  2023  Date Of Service:  23  Primary Care Physician:  Sri Bangura MD     LOS: 2 days :    Chief Complaint:      Follow-up hip fracture, hypoxia    Subjective:    Very difficult situation currently.  At time of visit the patient was alert and able to follow commands, his speech is difficult to understand as he  "did not have teeth in.  He did appear labored at rest.  He reiterated to me that he did not want hip surgery, did not want a feeding tube, did not want to go to a nursing home.  No family was currently at bedside.  I did talk with the patient's daughter over the phone who is one of his power of 's.  I noted his current condition, his refusal of many things, and recommendations to talk with palliative care about their goals moving forward.  She was in agreement.    Hospital Course:    The patient is a chronically ill 89-year-old with medical history of chronic kidney disease stage IV, hypertension, hyperlipidemia, COPD with chronic respiratory failure, hypothyroidism, sick sinus syndrome status post pacemaker, paroxysmal atrial fibrillation, impaired mobility and ADLs, hearing loss, who had presented from home after sustaining a fall.  History obtained from ER record, patient at bedside.  No family available.  He did note he had been \"sick\" for a few days, somewhat short of breath with a cough.  Notes that earlier this morning while ambulating he had felt lightheaded, subsequently fell and landed on his right hip and lower leg.  Unable to ambulate thereafter.  He notes he does have COPD, has not smoked in many years.  Does use inhalers at home.  Notes sister helps him some, is his POA, also has a daughter.     In the ER, he was normotensive, heart rate in the 80s, afebrile.  Saturating 95% on 4 L of oxygen.  White count 10 hemoglobin 8.7 platelets 128.  Procalcitonin 0.07 proBNP 1600 high-sensitivity troponin of 80.  Creatinine 2.68 potassium 5 negative flu COVID test.  ABG pH 7.409 pCO2 38 pO2 58.  Urinalysis with small leukocyte esterase and 2+ bacteria.  CT cervical spine without contrast unremarkable.  CT scan of the head without contrast unremarkable.  3 view right knee x-ray unremarkable.  Bilateral hip x-rays with evidence of a subcapital right femoral neck fracture with displacement noted.  Chest " x-ray with chronic findings.  The patient was given pain control, bronchodilators, steroids.  Dr. Oliveros with orthopedics to see in consult.  We were asked admit thereafter.    The patient had had increasing oxygen requirements, after speech therapy evaluation he had evidence of severe aspiration and was made NPO.  Did spike fever overnight of 12/27/2023, was increasingly hypoxic and given diuretics and additional breathing treatments.  Was started on empiric antibiotics with Zosyn.    The patient's kidney function has also decreased while in the hospital.  Nephrology has been following.  In addition, his hip fracture surgery has been placed on hold as patient has yet to decide on this.  The patient does not wish to go to a nursing home facility, he does not wish to have a feeding tube placed, and overall appears fairly restless and miserable.  Goals of care discussion have been continued and palliative care has been consulted.    Review of Systems:     All systems were reviewed and negative except as mentioned in subjective, assessment and plan.    Vital Signs:    Temp:  [97.6 °F (36.4 °C)-100.8 °F (38.2 °C)] 97.6 °F (36.4 °C)  Heart Rate:  [] 73  Resp:  [12-24] 12  BP: ()/(52-89) 120/65    Intake and output:    I/O last 3 completed shifts:  In: 1027 [P.O.:460; I.V.:567]  Out: 2070 [Urine:2070]  I/O this shift:  In: 182 [I.V.:182]  Out: 200 [Urine:200]    Physical Examination:    General Appearance:  Alert and cooperative.  Chronically ill-appearing   Head:  Atraumatic and normocephalic.   Eyes: Conjunctivae and sclerae normal, no icterus. No pallor.   Throat: No oral lesions, no thrush, oral mucosa moist.   Neck: Supple, trachea midline, no thyromegaly.   Lungs:   Diffuse rhonchi present.  High flow oxygen in place.   Heart:  Normal S1 and S2, no murmur, no gallop, no rub. No JVD.   Abdomen:   Normal bowel sounds, no masses, no organomegaly. Soft, nontender, nondistended, no rebound tenderness.    Extremities: Right hip externally rotated.  Edematous   Skin: No bleeding or rash.   Neurologic: Alert and oriented x 3. No facial asymmetry. Moves all four limbs. No tremors.  Weakness, hard of hearing     Laboratory results:    Results from last 7 days   Lab Units 12/28/23  0434 12/27/23  0434 12/26/23  1216   SODIUM mmol/L 136 138 139   POTASSIUM mmol/L 5.3* 4.9 5.0   CHLORIDE mmol/L 101 102 101   CO2 mmol/L 20.9* 22.2 24.8   BUN mg/dL 68* 48* 44*   CREATININE mg/dL 3.11* 2.77* 2.68*   CALCIUM mg/dL 8.5 9.2 9.5   BILIRUBIN mg/dL  --   --  0.7   ALK PHOS U/L  --   --  103   ALT (SGPT) U/L  --   --  11   AST (SGOT) U/L  --   --  19   GLUCOSE mg/dL 158* 162* 127*     Results from last 7 days   Lab Units 12/28/23  0434 12/27/23  0434 12/26/23  1216   WBC 10*3/mm3 9.10 12.35* 10.49   HEMOGLOBIN g/dL 8.0* 8.2* 8.7*   HEMATOCRIT % 25.1* 26.3* 27.9*   PLATELETS 10*3/mm3 128* 126* 128*         Results from last 7 days   Lab Units 12/28/23  0632 12/28/23  0434 12/27/23  2312 12/27/23  2052   CK TOTAL U/L  --  594*  --   --    HSTROP T ng/L 132*  --  113* 111*     Results from last 7 days   Lab Units 12/26/23  1350   URINECX  No growth     Recent Labs     12/26/23  1327   PHART 7.409   PUA8CQP 38.7   PO2ART 58.9*   XWY1BYC 24.4   BASEEXCESS -0.2*      I have reviewed the patient's laboratory results.    Radiology results:    XR Chest 1 View    Result Date: 12/27/2023  FINAL REPORT TECHNIQUE: A single view of the chest was obtained. CLINICAL HISTORY: low sats FINDINGS: There is a left chest wall implanted cardiac device.  There is pulmonary venous congestion with diffuse interstitial opacities. There is no pneumothorax or significant pleural effusion. There is borderline cardiomegaly.     Impression: Lung findings could represent pulmonary edema or viral pneumonia. Authenticated and Electronically Signed by Garo Black MD on 12/27/2023 09:53:39 PM    CT Cervical Spine Without Contrast    Result Date:  12/26/2023  PROCEDURE: CT CERVICAL SPINE WO CONTRAST-  HISTORY: Neck trauma (Age >= 65y), neck pain  TECHNIQUE: Thin section axial CT with sagittal and coronal reconstructions  FINDINGS: No fracture is present. Alignment is normal.  No bony canal stenosis is seen. Mild diffuse degenerative disc disease is present. Mild facet arthropathy is noted.      Impression: Negative CT evaluation of the cervical spine for acute bony injury.    This study was performed with techniques to keep radiation doses as low as reasonably achievable (ALARA). Individualized dose reduction techniques using automated exposure control or adjustment of vA and/or kV according to the patient size were employed.  This report was signed and finalized on 12/26/2023 2:34 PM by Niels Sierra MD.      CT Head Without Contrast    Result Date: 12/26/2023  PROCEDURE: CT HEAD WO CONTRAST-  HISTORY: Head trauma, minor (Age >= 65y), pain  COMPARISON: 7/15/2022  TECHNIQUE: Noncontrast exam  FINDINGS: Advanced atrophy and chronic ischemic white matter changes are noted.  No cortical edema is present. There is no mass or hemorrhage. Ventricles are normal.  Bone windows show no skull fracture or obvious destructive lesion.      Impression: 1. No acute intracranial abnormality or obvious mass. 2. Atrophy and chronic ischemic white matter changes as above.   This study was performed with techniques to keep radiation doses as low as reasonably achievable (ALARA). Individualized dose reduction techniques using automated exposure control or adjustment of vA and/or kV according to the patient size were employed.    This report was signed and finalized on 12/26/2023 2:31 PM by Niels Sierra MD.     I have reviewed the patient's radiology reports.    Medication Review:     I have reviewed the patient's active and prn medications.     Problem List:      Acute hypoxic respiratory failure    Severe malnutrition      Assessment:    Right femoral neck fracture, POA  Acute  on chronic hypoxic respiratory failure, POA   COPD with exacerbation  History of sick sinus syndrome status post pacemaker  Atrial fibrillation on Eliquis  Hearing loss  Acute on chronic kidney disease stage IV    Plan:    Hip fracture:  The patient has been seen by orthopedics in consultation with recommendations for hip fracture surgery.  The patient has reiterated that he does not want surgery a couple times now, again today.  The patient also thinks he can bear weight on this and will be fine letting it heal on its own.  I discussed he cannot take care of himself at home in his current condition especially with a hip fracture, however he does not wish to go to a nursing facility and family would not be able to provide level of care.     COPD/aspiration:  The patient did have evidence of symptoms of COPD exacerbation prior to his fall, however after speech therapy evaluation, likely patient is aspirating.  She was concerned and did recommend an MBS, noted this would likely be abnormal and alternative ways to feed him including feeding tube/PEG tube would probably be recommended.  I discussed this with the patient who notes he would refuse that if necessary.     Sick sinus syndrome:  Follows with Dr. Haro.  Has pacemaker, also with atrial fibrillation chronically on Eliquis.  Preop cardiology evaluation.     Renal failure:  Unfortunately kidney function has worsened.  Likely due to hypotension, possible shock, diuretic effect.  Nephrology following to give back some fluids today Lemons catheter remains in place.    Overall, situation remains fairly complicated.  Patient has refused surgical intervention of the hip, alternative feeding methods.  He reiterated he can take care of all the stuff at home, however obviously he cannot.  Daughter was updated today, going to talk with the other POA is Sister and palliative care.    DVT Prophylaxis: SCDs  Code Status: DNR  Diet: N.p.o.  Discharge Plan: LUCRECIA CLEMONS  DO Ochoa  23  13:59 EST    Dictated utilizing Dragon dictation.      Electronically signed by Leslie Packer DO at 23 1405          Discharge Summary        Leslie Packer DO at 23 1013              [facility]   DISCHARGE SUMMARY      Name:  Jonathan Brown   Age:  89 y.o.  Sex:  male  :  1933  MRN:  [id number]   Visit Number:  [id number]    Admission Date:  2023  Date of Discharge:  2023  Primary Care Physician:  Sri Bagnura MD    Important issues to note:    After discussion with patient, family, POA and palliative care have elected to proceed with hospice care and transfer to [facility]    Discharge Diagnoses:     Right femoral neck fracture, POA  Acute on chronic hypoxic respiratory failure, POA   COPD with exacerbation  History of sick sinus syndrome status post pacemaker  Atrial fibrillation on Eliquis  Hearing loss  Acute on chronic kidney disease stage IV    Problem List:     Active Hospital Problems    Diagnosis  POA    **Acute hypoxic respiratory failure [J96.01]  Yes    Severe malnutrition [E43]  Yes      Resolved Hospital Problems   No resolved problems to display.     Presenting Problem:    Chief Complaint   Patient presents with    Fall    Dizziness      Consults:     Consulting Physician(s)         Provider   Role Specialty     Scott Vincent MD, SAMEERA  Consulting Physician Nephrology     Diego Oliveros MD  Consulting Physician Orthopedic Surgery          Procedures Performed:    Procedure(s):  HIP HEMIARTHROPLASTY    History of presenting illness/Hospital Course:    The patient is a chronically ill 89-year-old with medical history of chronic kidney disease stage IV, hypertension, hyperlipidemia, COPD with chronic respiratory failure, hypothyroidism, sick sinus syndrome status post pacemaker, paroxysmal atrial fibrillation, impaired mobility and ADLs, hearing loss, who had presented from home  "after sustaining a fall.  History obtained from ER record, patient at bedside.  No family available.  He did note he had been \"sick\" for a few days, somewhat short of breath with a cough.  Notes that earlier this morning while ambulating he had felt lightheaded, subsequently fell and landed on his right hip and lower leg.  Unable to ambulate thereafter.  He notes he does have COPD, has not smoked in many years.  Does use inhalers at home.  Notes sister helps him some, is his POA, also has a daughter.     In the ER, he was normotensive, heart rate in the 80s, afebrile.  Saturating 95% on 4 L of oxygen.  White count 10 hemoglobin 8.7 platelets 128.  Procalcitonin 0.07 proBNP 1600 high-sensitivity troponin of 80.  Creatinine 2.68 potassium 5 negative flu COVID test.  ABG pH 7.409 pCO2 38 pO2 58.  Urinalysis with small leukocyte esterase and 2+ bacteria.  CT cervical spine without contrast unremarkable.  CT scan of the head without contrast unremarkable.  3 view right knee x-ray unremarkable.  Bilateral hip x-rays with evidence of a subcapital right femoral neck fracture with displacement noted.  Chest x-ray with chronic findings.  The patient was given pain control, bronchodilators, steroids.  Dr. Oliveros with orthopedics to see in consult.  We were asked admit thereafter.     The patient had had increasing oxygen requirements, after speech therapy evaluation he had evidence of severe aspiration and was made NPO.  Did spike fever overnight of 12/27/2023, was increasingly hypoxic and given diuretics and additional breathing treatments.  Was started on empiric antibiotics with Zosyn.     The patient's kidney function has also decreased while in the hospital.  Nephrology has been following.  After multiple discussions with the patient, his power of , other family, and hospice and palliative care team, he has elected not to proceed with any operative intervention for the hip fracture.  He does not wish to be evaluated " for feeding tube placement.  The patient's primary concern at this point is to be comfortable and not in a hospital.  He is not safe to tolerate oral intake other than comfort feeds at this time.    The patient otherwise is currently in stable condition will be transferred to [facility] care center today for further hospice care.  Family is agreeable.    Vital Signs:    Temp:  [97.4 °F (36.3 °C)-98.7 °F (37.1 °C)] 97.9 °F (36.6 °C)  Heart Rate:  [] 80  Resp:  [12-18] 18  BP: ()/(46-85) 114/58    Physical Exam:    General Appearance:  Alert and cooperative.  Ill-appearing   Head:  Atraumatic and normocephalic.   Eyes: Conjunctivae and sclerae normal, no icterus. No pallor.   Ears:  Ears with no abnormalities noted.   Throat: No oral lesions, no thrush, oral mucosa extremely dry   Neck: Supple, trachea midline, no thyromegaly.   Back:   No kyphoscoliosis present. No tenderness to palpation.   Lungs:   Diffuse crackles   Heart:  Normal S1 and S2, no murmur, no gallop, no rub. No JVD.   Abdomen:   Normal bowel sounds, no masses, no organomegaly. Soft, nontender, nondistended, no rebound tenderness.   Extremities: Externally rotated lower extremity, edema present   Pulses: Pulses palpable bilaterally.   Skin: No bleeding or rash.   Neurologic: Alert and oriented x 3. No facial asymmetry. Moves all four limbs. No tremors.     Pertinent Lab Results:     Results from last 7 days   Lab Units 12/28/23  0434 12/27/23  0434 12/26/23  1216   SODIUM mmol/L 136 138 139   POTASSIUM mmol/L 5.3* 4.9 5.0   CHLORIDE mmol/L 101 102 101   CO2 mmol/L 20.9* 22.2 24.8   BUN mg/dL 68* 48* 44*   CREATININE mg/dL 3.11* 2.77* 2.68*   CALCIUM mg/dL 8.5 9.2 9.5   BILIRUBIN mg/dL  --   --  0.7   ALK PHOS U/L  --   --  103   ALT (SGPT) U/L  --   --  11   AST (SGOT) U/L  --   --  19   GLUCOSE mg/dL 158* 162* 127*     Results from last 7 days   Lab Units 12/28/23  0434 12/27/23  0434 12/26/23  1216   WBC 10*3/mm3 9.10 12.35* 10.49    HEMOGLOBIN g/dL 8.0* 8.2* 8.7*   HEMATOCRIT % 25.1* 26.3* 27.9*   PLATELETS 10*3/mm3 128* 126* 128*         Results from last 7 days   Lab Units 12/28/23  0632 12/28/23  0434 12/27/23  2312 12/27/23  2052   CK TOTAL U/L  --  594*  --   --    HSTROP T ng/L 132*  --  113* 111*     Results from last 7 days   Lab Units 12/26/23  1216   PROBNP pg/mL 1,662.0             Results from last 7 days   Lab Units 12/26/23  1327   PH, ARTERIAL pH units 7.409   PO2 ART mm Hg 58.9*   PCO2, ARTERIAL mm Hg 38.7   HCO3 ART mmol/L 24.4     Results from last 7 days   Lab Units 12/28/23  0630 12/26/23  1350   BLOODCX  No growth at 24 hours  --    URINECX   --  No growth       Pertinent Radiology Results:    Imaging Results (All)       Procedure Component Value Units Date/Time    XR Chest 1 View [[id number]] Collected: 12/27/23 2153     Updated: 12/27/23 2154    Narrative:      FINAL REPORT    TECHNIQUE:  A single view of the chest was obtained.    CLINICAL HISTORY:  low sats    FINDINGS:  There is a left chest wall implanted cardiac device.  There is  pulmonary venous congestion with diffuse interstitial opacities.  There is no pneumothorax or significant pleural effusion.  There is borderline cardiomegaly.      Impression:      Lung findings could represent pulmonary edema or viral pneumonia.    Authenticated and Electronically Signed by Garo Black MD on  12/27/2023 09:53:39 PM    CT Cervical Spine Without Contrast [[id number]] Collected: 12/26/23 1433     Updated: 12/26/23 1436    Narrative:      PROCEDURE: CT CERVICAL SPINE WO CONTRAST-     HISTORY: Neck trauma (Age >= 65y), neck pain     TECHNIQUE: Thin section axial CT with sagittal and coronal  reconstructions     FINDINGS: No fracture is present. Alignment is normal.  No bony canal  stenosis is seen. Mild diffuse degenerative disc disease is present.  Mild facet arthropathy is noted.       Impression:      Negative CT evaluation of the cervical spine for acute bony  injury.            This study was performed with techniques to keep radiation doses as low  as reasonably achievable (ALARA). Individualized dose reduction  techniques using automated exposure control or adjustment of vA and/or  kV according to the patient size were employed.      This report was signed and finalized on 12/26/2023 2:34 PM by Niels Sierra MD.       CT Head Without Contrast [[id number]] Collected: 12/26/23 1430     Updated: 12/26/23 1433    Narrative:      PROCEDURE: CT HEAD WO CONTRAST-     HISTORY: Head trauma, minor (Age >= 65y), pain     COMPARISON: 7/15/2022     TECHNIQUE: Noncontrast exam     FINDINGS: Advanced atrophy and chronic ischemic white matter changes are  noted.     No cortical edema is present. There is no mass or hemorrhage. Ventricles  are normal.     Bone windows show no skull fracture or obvious destructive lesion.       Impression:      1. No acute intracranial abnormality or obvious mass.   2. Atrophy and chronic ischemic white matter changes as above.        This study was performed with techniques to keep radiation doses as low  as reasonably achievable (ALARA). Individualized dose reduction  techniques using automated exposure control or adjustment of vA and/or  kV according to the patient size were employed.            This report was signed and finalized on 12/26/2023 2:31 PM by Niels Sierra MD.       XR Hips Bilateral With or Without Pelvis 3-4 View [[id number]] Collected: 12/26/23 1418     Updated: 12/26/23 1421    Narrative:      PROCEDURE: XR HIPS BILATERAL W OR WO PELVIS 3-4 VIEW-     THREE VIEW     HISTORY: trauma     FINDINGS:  Three views show fracture of the subcapital right femoral  neck.  There is moderate displacement. Left proximal femur is intact.     The joint spaces appear normal.          Impression:      Subcapital right femoral neck fracture as above.           This report was signed and finalized on 12/26/2023 2:19 PM by Niels Sierra MD.       XR Knee 3 View  Right [[id number]] Collected: 12/26/23 1418     Updated: 12/26/23 1420    Narrative:      PROCEDURE: XR KNEE 3 VW RIGHT-     THREE VIEW     HISTORY: trauma, pain     FINDINGS:  Three views show no evidence of an acute, displaced fracture  or dislocation of the visualized bony architecture.  The joint spaces  appear normal. Generalized osteopenia is present.       Impression:      Unremarkable exam.              This report was signed and finalized on 12/26/2023 2:18 PM by Niels Sierra MD.       XR Chest 1 View [[id number]] Collected: 12/26/23 1332     Updated: 12/26/23 1335    Narrative:      Chest single view     COMPARISON: Chest from 4-8-2019     FINDINGS: Cardiac silhouette is of normal size. Left subclavian  pacemaker is present.     Mild linear density is identified in the perihilar regions bilaterally,  consistent with atelectasis. No localized airspace infiltrates are seen.       Impression:      1. Chronic changes and mild perihilar atelectasis, as described.  2. No evidence of acute infiltrate.     This report was signed and finalized on 12/26/2023 1:33 PM by Erlin Duncan MD.               Echo:    Results for orders placed in visit on 09/19/23    Adult Transthoracic Echo Complete W/ Cont if Necessary Per Protocol    Interpretation Summary    Left ventricular systolic function is normal. Estimated left ventricular EF = 54% Left ventricular ejection fraction appears to be 51 - 55%.    Left ventricular wall thickness is consistent with mild concentric hypertrophy.    Left ventricular diastolic function is consistent with (grade I) impaired relaxation.    The right atrial cavity is mildly  dilated.    There is mild calcification of the aortic valve mainly affecting the non-coronary, left coronary and right coronary cusp(s).    Estimated right ventricular systolic pressure from tricuspid regurgitation is mildly elevated (35-45 mmHg).    Mild pulmonary hypertension is present.    Condition on Discharge:       Stable.    Code status during the hospital stay:    Code Status and Medical Interventions:   Ordered at: 12/26/23 1800     Medical Intervention Limits:    NO intubation (DNI)    NO cardioversion     Code Status (Patient has no pulse and is not breathing):    No CPR (Do Not Attempt to Resuscitate)     Medical Interventions (Patient has pulse or is breathing):    Limited Support     Discharge Disposition:    Hospice/Medical Facility (DC - External)    Discharge Medications:       Discharge Medications        New Medications        Instructions Start Date   bisacodyl 5 MG EC tablet  Commonly known as: DULCOLAX   5 mg, Oral, Daily PRN      bisacodyl 10 MG suppository  Commonly known as: DULCOLAX   10 mg, Rectal, Daily PRN      ipratropium-albuterol 0.5-2.5 mg/3 ml nebulizer  Commonly known as: DUO-NEB   3 mL, Nebulization, Every 4 Hours PRN      morphine 20 MG/5ML solution   5.2 mg, Oral, Every 4 Hours PRN      polyethylene glycol 17 g packet  Commonly known as: MIRALAX   17 g, Oral, Daily PRN      sennosides-docusate 8.6-50 MG per tablet  Commonly known as: PERICOLACE   2 tablets, Oral, 2 Times Daily             Continue These Medications        Instructions Start Date   apixaban 2.5 MG tablet tablet  Commonly known as: ELIQUIS   2.5 mg, Oral, Every 12 Hours Scheduled             Stop These Medications      aspirin 81 MG EC tablet     finasteride 5 MG tablet  Commonly known as: PROSCAR     furosemide 20 MG tablet  Commonly known as: LASIX     irbesartan 150 MG tablet  Commonly known as: AVAPRO     levothyroxine 100 MCG tablet  Commonly known as: SYNTHROID, LEVOTHROID     omeprazole 20 MG capsule  Commonly known as: priLOSEC     ondansetron 4 MG tablet  Commonly known as: ZOFRAN     Polyethylene Glycol powder     PRESERVISION AREDS 2 PO     simvastatin 40 MG tablet  Commonly known as: ZOCOR     tamsulosin 0.4 MG capsule 24 hr capsule  Commonly known as: FLOMAX     tiotropium 18 MCG per inhalation capsule  Commonly  known as: SPIRIVA     VITAMIN B-12 PO     vitamin D3 125 MCG (5000 UT) capsule capsule            Discharge Diet:     Diet Instructions       Diet: Nothing By Mouth      Discharge Diet: Nothing By Mouth          Activity at Discharge:       Follow-up Appointments:     Contact information for follow-up providers       Sri Bangura MD .    Specialty: Family Medicine  Contact information:  [address] [place] [address]  [phone]                       Contact information for after-discharge care       Destination       [facility] .    Service: Inpatient Hospice  Contact information:  [address]  [facility] [address]  [phone]                                 Future Appointments   Date Time Provider Department Center   2/27/2024 10:00 AM Laith Haro MD [facility]   2/27/2024 10:00 AM [facility] [place] DEVICE CHECK [facility]     Test Results Pending at Discharge:    Pending Labs       Order Current Status    Blood Culture With KEYANA - Blood, Arm, Right Preliminary result               Leslie Packer DO  12/29/23  10:13 EST    Time: I spent 20 minutes on this discharge activity which included: face-to-face encounter with the patient, reviewing the data in the system, coordination of the care with the nursing staff as well as consultants, documentation, and entering orders.     Dictated utilizing Dragon dictation.      Electronically signed by Leslie Packer DO at 12/29/23 9320

## 2024-01-02 LAB — BACTERIA SPEC AEROBE CULT: NORMAL

## 2024-01-09 ENCOUNTER — TELEPHONE (OUTPATIENT)
Dept: CARDIOLOGY | Facility: CLINIC | Age: 89
End: 2024-01-09

## 2024-01-09 NOTE — TELEPHONE ENCOUNTER
“Please be informed that patient has passed. Patient has been marked  in the system. The date of death is:24    Caller: joan morrow    Relationship: Emergency Contact    Best call back number: [phone]    Did the patient have surgery within 30 days of their passing (Y/N): NO

## (undated) DEVICE — INTRO SHEATH PRELUDE SNAP .038 6F 13CM W/SDPRT

## (undated) DEVICE — DRSNG SURG AQUACEL AG 9X10CM